# Patient Record
Sex: FEMALE | Race: WHITE | Employment: OTHER | URBAN - METROPOLITAN AREA
[De-identification: names, ages, dates, MRNs, and addresses within clinical notes are randomized per-mention and may not be internally consistent; named-entity substitution may affect disease eponyms.]

---

## 2018-03-20 ENCOUNTER — NURSE ONLY (OUTPATIENT)
Dept: NON INVASIVE DIAGNOSTICS | Age: 68
End: 2018-03-20
Payer: MEDICARE

## 2018-03-20 DIAGNOSIS — I49.5 SSS (SICK SINUS SYNDROME) (HCC): ICD-10-CM

## 2018-03-20 DIAGNOSIS — Z95.0 PACEMAKER: Primary | ICD-10-CM

## 2018-03-20 PROCEDURE — 93296 REM INTERROG EVL PM/IDS: CPT | Performed by: INTERNAL MEDICINE

## 2018-03-20 PROCEDURE — 93294 REM INTERROG EVL PM/LDLS PM: CPT | Performed by: INTERNAL MEDICINE

## 2018-06-13 ENCOUNTER — OFFICE VISIT (OUTPATIENT)
Dept: NON INVASIVE DIAGNOSTICS | Age: 68
End: 2018-06-13
Payer: MEDICARE

## 2018-06-13 VITALS
SYSTOLIC BLOOD PRESSURE: 138 MMHG | HEART RATE: 78 BPM | HEIGHT: 68 IN | RESPIRATION RATE: 14 BRPM | DIASTOLIC BLOOD PRESSURE: 68 MMHG | WEIGHT: 204 LBS | BODY MASS INDEX: 30.92 KG/M2

## 2018-06-13 DIAGNOSIS — E66.9 OBESITY (BMI 30.0-34.9): ICD-10-CM

## 2018-06-13 DIAGNOSIS — I49.5 SSS (SICK SINUS SYNDROME) (HCC): ICD-10-CM

## 2018-06-13 DIAGNOSIS — Z95.0 PACEMAKER: Primary | ICD-10-CM

## 2018-06-13 DIAGNOSIS — I10 HTN (HYPERTENSION), BENIGN: ICD-10-CM

## 2018-06-13 PROCEDURE — 93280 PM DEVICE PROGR EVAL DUAL: CPT | Performed by: INTERNAL MEDICINE

## 2018-06-13 PROCEDURE — 1036F TOBACCO NON-USER: CPT | Performed by: INTERNAL MEDICINE

## 2018-06-13 PROCEDURE — 4040F PNEUMOC VAC/ADMIN/RCVD: CPT | Performed by: INTERNAL MEDICINE

## 2018-06-13 PROCEDURE — 3017F COLORECTAL CA SCREEN DOC REV: CPT | Performed by: INTERNAL MEDICINE

## 2018-06-13 PROCEDURE — 99213 OFFICE O/P EST LOW 20 MIN: CPT | Performed by: INTERNAL MEDICINE

## 2018-06-13 PROCEDURE — G8427 DOCREV CUR MEDS BY ELIG CLIN: HCPCS | Performed by: INTERNAL MEDICINE

## 2018-06-13 PROCEDURE — 1090F PRES/ABSN URINE INCON ASSESS: CPT | Performed by: INTERNAL MEDICINE

## 2018-06-13 PROCEDURE — G8400 PT W/DXA NO RESULTS DOC: HCPCS | Performed by: INTERNAL MEDICINE

## 2018-06-13 PROCEDURE — G8417 CALC BMI ABV UP PARAM F/U: HCPCS | Performed by: INTERNAL MEDICINE

## 2018-06-13 PROCEDURE — 1123F ACP DISCUSS/DSCN MKR DOCD: CPT | Performed by: INTERNAL MEDICINE

## 2018-08-06 ENCOUNTER — APPOINTMENT (OUTPATIENT)
Dept: GENERAL RADIOLOGY | Age: 68
End: 2018-08-06
Payer: MEDICARE

## 2018-08-06 ENCOUNTER — HOSPITAL ENCOUNTER (EMERGENCY)
Age: 68
Discharge: HOME | End: 2018-08-06
Attending: EMERGENCY MEDICINE
Payer: MEDICARE

## 2018-08-06 VITALS
SYSTOLIC BLOOD PRESSURE: 138 MMHG | TEMPERATURE: 98 F | RESPIRATION RATE: 16 BRPM | HEART RATE: 74 BPM | OXYGEN SATURATION: 94 % | HEIGHT: 68 IN | DIASTOLIC BLOOD PRESSURE: 62 MMHG | WEIGHT: 200 LBS | BODY MASS INDEX: 30.31 KG/M2

## 2018-08-06 DIAGNOSIS — S42.225A CLOSED 2-PART NONDISPLACED FRACTURE OF SURGICAL NECK OF LEFT HUMERUS, INITIAL ENCOUNTER: Primary | ICD-10-CM

## 2018-08-06 PROCEDURE — 6370000000 HC RX 637 (ALT 250 FOR IP): Performed by: EMERGENCY MEDICINE

## 2018-08-06 PROCEDURE — 99283 EMERGENCY DEPT VISIT LOW MDM: CPT

## 2018-08-06 PROCEDURE — 73030 X-RAY EXAM OF SHOULDER: CPT

## 2018-08-06 RX ORDER — ACETAMINOPHEN 500 MG
1000 TABLET ORAL ONCE
Status: COMPLETED | OUTPATIENT
Start: 2018-08-06 | End: 2018-08-06

## 2018-08-06 RX ORDER — HYDROCODONE BITARTRATE AND ACETAMINOPHEN 5; 325 MG/1; MG/1
1 TABLET ORAL EVERY 6 HOURS PRN
Qty: 20 TABLET | Refills: 0 | Status: SHIPPED | OUTPATIENT
Start: 2018-08-06 | End: 2018-08-11

## 2018-08-06 RX ORDER — CHOLECALCIFEROL (VITAMIN D3) 125 MCG
500 CAPSULE ORAL DAILY
Status: ON HOLD | COMMUNITY

## 2018-08-06 RX ORDER — FAMOTIDINE 40 MG/1
40 TABLET, FILM COATED ORAL DAILY
COMMUNITY
End: 2019-10-15

## 2018-08-06 RX ORDER — PROMETHAZINE HYDROCHLORIDE 25 MG/1
25 TABLET ORAL EVERY 8 HOURS PRN
Status: ON HOLD | COMMUNITY

## 2018-08-06 RX ADMIN — ACETAMINOPHEN 1000 MG: 500 TABLET ORAL at 19:28

## 2018-08-06 ASSESSMENT — PAIN DESCRIPTION - LOCATION
LOCATION: SHOULDER;ARM
LOCATION: ARM;SHOULDER

## 2018-08-06 ASSESSMENT — PAIN DESCRIPTION - FREQUENCY
FREQUENCY: CONTINUOUS
FREQUENCY: CONTINUOUS

## 2018-08-06 ASSESSMENT — PAIN DESCRIPTION - ORIENTATION
ORIENTATION: LEFT;UPPER
ORIENTATION: LEFT;UPPER

## 2018-08-06 ASSESSMENT — PAIN DESCRIPTION - DESCRIPTORS
DESCRIPTORS: SQUEEZING
DESCRIPTORS: ACHING

## 2018-08-06 ASSESSMENT — PAIN SCALES - GENERAL
PAINLEVEL_OUTOF10: 5
PAINLEVEL_OUTOF10: 7

## 2018-08-06 ASSESSMENT — PAIN DESCRIPTION - PAIN TYPE
TYPE: ACUTE PAIN
TYPE: ACUTE PAIN

## 2018-08-06 NOTE — ED PROVIDER NOTES
HPI:  18,   Time: 6:45 PM         Kenya Gurrola is a 79 y.o. female presenting to the ED for Left shoulder pain, beginning several minutes ago. The complaint has been persistent, moderate in severity, and worsened by movement of  The left arm . Patient states she get up off the couch and she turned became dizzy and fell against the fireplace landing on her left shoulder. She does complain of pain to the proximal left shoulder. She denies any other injuries. She does have a history of a syncope with collapse. She also suffers from hypertension and diabetes type II as well as COPD. She denies any chest pain or shortness of breath. She has no abdominal pain nausea vomiting melena or hematochezia. No other injuries are reported. She has no neurological complaints the left arm. She did not hit her head. She is on no blood thinners other than aspirin. EMS was called patient was put in a sling and swath and brought to [facility] for evaluation. ROS:   Pertinent positives and negatives are stated within HPI, all other systems reviewed and are negative.  --------------------------------------------- PAST HISTORY ---------------------------------------------  Past Medical History:  has a past medical history of COPD (chronic obstructive pulmonary disease) ([facility] [address] [facility] Depression; [facility] Hypertension; Neuropathy; Sinus arrest; SSS [facility]) [facility] and Syncope and collapse. Past Surgical History:  has a past surgical history that includes  section; Hysterectomy; Tubal ligation; and Cardiac pacemaker placement (2016). Social History:  reports that she quit smoking about 12 years ago. She started smoking about 55 years ago. She smoked 1.00 pack per day. She has never used smokeless tobacco. She reports that she does not drink alcohol or use drugs. Family History: family history includes Alzheimer's Disease in her father.      The patients home medications have been reviewed. Allergies: Penicillins and Sulfa antibiotics    -------------------------------------------------- RESULTS -------------------------------------------------  All laboratory and radiology results have been personally reviewed by myself   LABS:  No results found for this visit on 08/06/18. RADIOLOGY:  Interpreted by Radiologist.  XR SHOULDER LEFT (MIN 2 VIEWS)   Final Result   Angulated fracture of the left humerus. ALERT:  THIS IS AN ABNORMAL REPORT             ------------------------- NURSING NOTES AND VITALS REVIEWED ---------------------------   The nursing notes within the ED encounter and vital signs as below have been reviewed. /62   Pulse 74   Temp 98 °F (36.7 °C) (Oral)   Resp 16   Ht 5' 8\" (1.727 m)   Wt 200 lb (90.7 kg)   SpO2 94%   BMI 30.41 kg/m²   Oxygen Saturation Interpretation: Normal      ---------------------------------------------------PHYSICAL EXAM--------------------------------------      Constitutional/General: Alert and oriented x3, well appearing, non toxic in NAD  Head: NC/AT  Eyes: PERRL, EOMI  Mouth: Oropharynx clear, handling secretions, no trismus  Neck: Supple, full ROM, no meningeal signs  Pulmonary: Lungs clear to auscultation bilaterally, no wheezes, rales, or rhonchi. Not in respiratory distress  Cardiovascular:  Regular rate and rhythm, no murmurs, gallops, or rubs. 2+ distal pulses  Abdomen: Soft, non tender, non distended,   Extremities: Moves all extremities x 3, patient's unable to move the left shoulder secondary to pain. . Warm and well perfused  Skin: warm and dry without rash  Neurologic: GCS 15, no focal deficits. Patient has good motor strength in the left hand.   Psych: Normal Affect      ------------------------------ ED COURSE/MEDICAL DECISION MAKING----------------------  Medications   acetaminophen (TYLENOL) tablet 1,000 mg (1,000 mg Oral Given 8/6/18 1928)         Medical Decision Making:    Tylenol for pain xray of the left

## 2018-08-07 ENCOUNTER — APPOINTMENT (OUTPATIENT)
Dept: CT IMAGING | Age: 68
End: 2018-08-07
Payer: MEDICARE

## 2018-08-07 ENCOUNTER — APPOINTMENT (OUTPATIENT)
Dept: GENERAL RADIOLOGY | Age: 68
End: 2018-08-07
Payer: MEDICARE

## 2018-08-07 ENCOUNTER — HOSPITAL ENCOUNTER (OUTPATIENT)
Age: 68
Setting detail: OBSERVATION
Discharge: HOME | End: 2018-08-08
Attending: EMERGENCY MEDICINE | Admitting: INTERNAL MEDICINE
Payer: MEDICARE

## 2018-08-07 DIAGNOSIS — I95.1 ORTHOSTATIC HYPOTENSION: ICD-10-CM

## 2018-08-07 DIAGNOSIS — R55 NEAR SYNCOPE: Primary | ICD-10-CM

## 2018-08-07 DIAGNOSIS — S42.212D CLOSED DISPLACED FRACTURE OF SURGICAL NECK OF LEFT HUMERUS WITH ROUTINE HEALING, UNSPECIFIED FRACTURE MORPHOLOGY, SUBSEQUENT ENCOUNTER: ICD-10-CM

## 2018-08-07 LAB
ANION GAP SERPL CALCULATED.3IONS-SCNC: 11 MMOL/L (ref 7–16)
BACTERIA: ABNORMAL /HPF
BASOPHILS ABSOLUTE: 0.02 E9/L (ref 0–0.2)
BASOPHILS RELATIVE PERCENT: 0.2 % (ref 0–2)
BILIRUBIN URINE: NEGATIVE
BLOOD, URINE: NEGATIVE
BUN BLDV-MCNC: 16 MG/DL (ref 8–23)
CALCIUM SERPL-MCNC: 9.5 MG/DL (ref 8.6–10.2)
CHLORIDE BLD-SCNC: 96 MMOL/L (ref 98–107)
CLARITY: CLEAR
CO2: 29 MMOL/L (ref 22–29)
COLOR: YELLOW
CREAT SERPL-MCNC: 0.8 MG/DL (ref 0.5–1)
EOSINOPHILS ABSOLUTE: 0.3 E9/L (ref 0.05–0.5)
EOSINOPHILS RELATIVE PERCENT: 3 % (ref 0–6)
EPITHELIAL CELLS, UA: ABNORMAL /HPF
GFR AFRICAN AMERICAN: >60
GFR NON-AFRICAN AMERICAN: >60 ML/MIN/1.73
GLUCOSE BLD-MCNC: 158 MG/DL (ref 74–109)
GLUCOSE URINE: NEGATIVE MG/DL
HBA1C MFR BLD: 5.9 % (ref 4–5.6)
HCT VFR BLD CALC: 36.2 % (ref 34–48)
HEMOGLOBIN: 12 G/DL (ref 11.5–15.5)
IMMATURE GRANULOCYTES #: 0.07 E9/L
IMMATURE GRANULOCYTES %: 0.7 % (ref 0–5)
KETONES, URINE: NEGATIVE MG/DL
LEUKOCYTE ESTERASE, URINE: ABNORMAL
LYMPHOCYTES ABSOLUTE: 2.62 E9/L (ref 1.5–4)
LYMPHOCYTES RELATIVE PERCENT: 26.1 % (ref 20–42)
MCH RBC QN AUTO: 29.3 PG (ref 26–35)
MCHC RBC AUTO-ENTMCNC: 33.1 % (ref 32–34.5)
MCV RBC AUTO: 88.5 FL (ref 80–99.9)
METER GLUCOSE: 121 MG/DL (ref 70–110)
METER GLUCOSE: 133 MG/DL (ref 70–110)
METER GLUCOSE: 199 MG/DL (ref 70–110)
METER GLUCOSE: 201 MG/DL (ref 70–110)
MONOCYTES ABSOLUTE: 0.66 E9/L (ref 0.1–0.95)
MONOCYTES RELATIVE PERCENT: 6.6 % (ref 2–12)
NEUTROPHILS ABSOLUTE: 6.35 E9/L (ref 1.8–7.3)
NEUTROPHILS RELATIVE PERCENT: 63.4 % (ref 43–80)
NITRITE, URINE: NEGATIVE
PDW BLD-RTO: 12 FL (ref 11.5–15)
PH UA: 7 (ref 5–9)
PLATELET # BLD: 257 E9/L (ref 130–450)
PMV BLD AUTO: 9.1 FL (ref 7–12)
POTASSIUM SERPL-SCNC: 3.8 MMOL/L (ref 3.5–5)
PROTEIN UA: NEGATIVE MG/DL
RBC # BLD: 4.09 E12/L (ref 3.5–5.5)
RBC UA: ABNORMAL /HPF (ref 0–2)
SODIUM BLD-SCNC: 136 MMOL/L (ref 132–146)
SPECIFIC GRAVITY UA: 1.01 (ref 1–1.03)
TROPONIN: <0.01 NG/ML (ref 0–0.03)
UROBILINOGEN, URINE: 0.2 E.U./DL
WBC # BLD: 10 E9/L (ref 4.5–11.5)
WBC UA: ABNORMAL /HPF (ref 0–5)

## 2018-08-07 PROCEDURE — 96372 THER/PROPH/DIAG INJ SC/IM: CPT

## 2018-08-07 PROCEDURE — 85025 COMPLETE CBC W/AUTO DIFF WBC: CPT

## 2018-08-07 PROCEDURE — 2580000003 HC RX 258: Performed by: INTERNAL MEDICINE

## 2018-08-07 PROCEDURE — G0378 HOSPITAL OBSERVATION PER HR: HCPCS

## 2018-08-07 PROCEDURE — 2700000000 HC OXYGEN THERAPY PER DAY

## 2018-08-07 PROCEDURE — 82962 GLUCOSE BLOOD TEST: CPT

## 2018-08-07 PROCEDURE — 6360000002 HC RX W HCPCS: Performed by: INTERNAL MEDICINE

## 2018-08-07 PROCEDURE — 6370000000 HC RX 637 (ALT 250 FOR IP): Performed by: INTERNAL MEDICINE

## 2018-08-07 PROCEDURE — 99285 EMERGENCY DEPT VISIT HI MDM: CPT

## 2018-08-07 PROCEDURE — 96374 THER/PROPH/DIAG INJ IV PUSH: CPT

## 2018-08-07 PROCEDURE — 94664 DEMO&/EVAL PT USE INHALER: CPT

## 2018-08-07 PROCEDURE — G8982 BODY POS GOAL STATUS: HCPCS

## 2018-08-07 PROCEDURE — 71045 X-RAY EXAM CHEST 1 VIEW: CPT

## 2018-08-07 PROCEDURE — 94640 AIRWAY INHALATION TREATMENT: CPT

## 2018-08-07 PROCEDURE — 99222 1ST HOSP IP/OBS MODERATE 55: CPT | Performed by: INTERNAL MEDICINE

## 2018-08-07 PROCEDURE — 83036 HEMOGLOBIN GLYCOSYLATED A1C: CPT

## 2018-08-07 PROCEDURE — 97165 OT EVAL LOW COMPLEX 30 MIN: CPT

## 2018-08-07 PROCEDURE — 94761 N-INVAS EAR/PLS OXIMETRY MLT: CPT

## 2018-08-07 PROCEDURE — 93005 ELECTROCARDIOGRAM TRACING: CPT | Performed by: EMERGENCY MEDICINE

## 2018-08-07 PROCEDURE — 96361 HYDRATE IV INFUSION ADD-ON: CPT

## 2018-08-07 PROCEDURE — 70450 CT HEAD/BRAIN W/O DYE: CPT

## 2018-08-07 PROCEDURE — G8988 SELF CARE GOAL STATUS: HCPCS

## 2018-08-07 PROCEDURE — 81001 URINALYSIS AUTO W/SCOPE: CPT

## 2018-08-07 PROCEDURE — G8987 SELF CARE CURRENT STATUS: HCPCS

## 2018-08-07 PROCEDURE — G8981 BODY POS CURRENT STATUS: HCPCS

## 2018-08-07 PROCEDURE — 36415 COLL VENOUS BLD VENIPUNCTURE: CPT

## 2018-08-07 PROCEDURE — 2580000003 HC RX 258: Performed by: EMERGENCY MEDICINE

## 2018-08-07 PROCEDURE — 84484 ASSAY OF TROPONIN QUANT: CPT

## 2018-08-07 PROCEDURE — 97161 PT EVAL LOW COMPLEX 20 MIN: CPT

## 2018-08-07 PROCEDURE — 80048 BASIC METABOLIC PNL TOTAL CA: CPT

## 2018-08-07 PROCEDURE — 6370000000 HC RX 637 (ALT 250 FOR IP): Performed by: EMERGENCY MEDICINE

## 2018-08-07 RX ORDER — GLIMEPIRIDE 2 MG/1
2 TABLET ORAL
Status: DISCONTINUED | OUTPATIENT
Start: 2018-08-08 | End: 2018-08-08 | Stop reason: HOSPADM

## 2018-08-07 RX ORDER — PHENELZINE SULFATE 15 MG/1
15 TABLET ORAL 3 TIMES DAILY
Status: DISCONTINUED | OUTPATIENT
Start: 2018-08-07 | End: 2018-08-08 | Stop reason: HOSPADM

## 2018-08-07 RX ORDER — ACETAMINOPHEN 325 MG/1
650 TABLET ORAL EVERY 4 HOURS PRN
Status: DISCONTINUED | OUTPATIENT
Start: 2018-08-07 | End: 2018-08-08 | Stop reason: HOSPADM

## 2018-08-07 RX ORDER — PHENELZINE SULFATE 15 MG/1
15 TABLET ORAL 4 TIMES DAILY
Status: DISCONTINUED | OUTPATIENT
Start: 2018-08-07 | End: 2018-08-07

## 2018-08-07 RX ORDER — DEXTROSE MONOHYDRATE 50 MG/ML
100 INJECTION, SOLUTION INTRAVENOUS PRN
Status: DISCONTINUED | OUTPATIENT
Start: 2018-08-07 | End: 2018-08-08 | Stop reason: HOSPADM

## 2018-08-07 RX ORDER — GLIMEPIRIDE 2 MG/1
2 TABLET ORAL 2 TIMES DAILY WITH MEALS
Status: DISCONTINUED | OUTPATIENT
Start: 2018-08-07 | End: 2018-08-07

## 2018-08-07 RX ORDER — SODIUM CHLORIDE 0.9 % (FLUSH) 0.9 %
10 SYRINGE (ML) INJECTION PRN
Status: DISCONTINUED | OUTPATIENT
Start: 2018-08-07 | End: 2018-08-08 | Stop reason: HOSPADM

## 2018-08-07 RX ORDER — HYDROCODONE BITARTRATE AND ACETAMINOPHEN 5; 325 MG/1; MG/1
1 TABLET ORAL EVERY 6 HOURS PRN
Status: DISCONTINUED | OUTPATIENT
Start: 2018-08-07 | End: 2018-08-08 | Stop reason: HOSPADM

## 2018-08-07 RX ORDER — 0.9 % SODIUM CHLORIDE 0.9 %
1000 INTRAVENOUS SOLUTION INTRAVENOUS ONCE
Status: COMPLETED | OUTPATIENT
Start: 2018-08-07 | End: 2018-08-07

## 2018-08-07 RX ORDER — ONDANSETRON 2 MG/ML
4 INJECTION INTRAMUSCULAR; INTRAVENOUS EVERY 6 HOURS PRN
Status: DISCONTINUED | OUTPATIENT
Start: 2018-08-07 | End: 2018-08-08 | Stop reason: HOSPADM

## 2018-08-07 RX ORDER — ASPIRIN 81 MG/1
324 TABLET, CHEWABLE ORAL ONCE
Status: COMPLETED | OUTPATIENT
Start: 2018-08-07 | End: 2018-08-07

## 2018-08-07 RX ORDER — DEXTROSE MONOHYDRATE 25 G/50ML
12.5 INJECTION, SOLUTION INTRAVENOUS PRN
Status: DISCONTINUED | OUTPATIENT
Start: 2018-08-07 | End: 2018-08-08 | Stop reason: HOSPADM

## 2018-08-07 RX ORDER — LISINOPRIL 10 MG/1
10 TABLET ORAL DAILY
Status: DISCONTINUED | OUTPATIENT
Start: 2018-08-07 | End: 2018-08-08 | Stop reason: HOSPADM

## 2018-08-07 RX ORDER — LANOLIN ALCOHOL/MO/W.PET/CERES
500 CREAM (GRAM) TOPICAL DAILY
Status: DISCONTINUED | OUTPATIENT
Start: 2018-08-07 | End: 2018-08-08 | Stop reason: HOSPADM

## 2018-08-07 RX ORDER — FAMOTIDINE 20 MG/1
40 TABLET, FILM COATED ORAL DAILY
Status: DISCONTINUED | OUTPATIENT
Start: 2018-08-07 | End: 2018-08-08 | Stop reason: HOSPADM

## 2018-08-07 RX ORDER — ASPIRIN 325 MG
325 TABLET ORAL NIGHTLY
Status: DISCONTINUED | OUTPATIENT
Start: 2018-08-07 | End: 2018-08-08 | Stop reason: HOSPADM

## 2018-08-07 RX ORDER — SODIUM CHLORIDE 9 MG/ML
INJECTION, SOLUTION INTRAVENOUS CONTINUOUS
Status: DISCONTINUED | OUTPATIENT
Start: 2018-08-07 | End: 2018-08-08 | Stop reason: HOSPADM

## 2018-08-07 RX ORDER — GABAPENTIN 300 MG/1
300 CAPSULE ORAL NIGHTLY
Status: DISCONTINUED | OUTPATIENT
Start: 2018-08-07 | End: 2018-08-08 | Stop reason: HOSPADM

## 2018-08-07 RX ORDER — LORAZEPAM 1 MG/1
1 TABLET ORAL EVERY 6 HOURS PRN
Status: DISCONTINUED | OUTPATIENT
Start: 2018-08-07 | End: 2018-08-08 | Stop reason: HOSPADM

## 2018-08-07 RX ORDER — NICOTINE POLACRILEX 4 MG
15 LOZENGE BUCCAL PRN
Status: DISCONTINUED | OUTPATIENT
Start: 2018-08-07 | End: 2018-08-08 | Stop reason: HOSPADM

## 2018-08-07 RX ORDER — SODIUM CHLORIDE 0.9 % (FLUSH) 0.9 %
10 SYRINGE (ML) INJECTION EVERY 12 HOURS SCHEDULED
Status: DISCONTINUED | OUTPATIENT
Start: 2018-08-07 | End: 2018-08-08 | Stop reason: HOSPADM

## 2018-08-07 RX ORDER — ARIPIPRAZOLE 10 MG/1
10 TABLET ORAL NIGHTLY
Status: DISCONTINUED | OUTPATIENT
Start: 2018-08-07 | End: 2018-08-08 | Stop reason: HOSPADM

## 2018-08-07 RX ORDER — FLUTICASONE FUROATE AND VILANTEROL 100; 25 UG/1; UG/1
1 POWDER RESPIRATORY (INHALATION) DAILY
Status: DISCONTINUED | OUTPATIENT
Start: 2018-08-07 | End: 2018-08-07 | Stop reason: ALTCHOICE

## 2018-08-07 RX ORDER — HYDROCODONE BITARTRATE AND ACETAMINOPHEN 5; 325 MG/1; MG/1
1 TABLET ORAL ONCE
Status: COMPLETED | OUTPATIENT
Start: 2018-08-07 | End: 2018-08-07

## 2018-08-07 RX ORDER — RISPERIDONE 1 MG/1
1 TABLET, FILM COATED ORAL DAILY
Status: DISCONTINUED | OUTPATIENT
Start: 2018-08-07 | End: 2018-08-08 | Stop reason: HOSPADM

## 2018-08-07 RX ORDER — PROMETHAZINE HYDROCHLORIDE 25 MG/1
25 TABLET ORAL 3 TIMES DAILY
Status: DISCONTINUED | OUTPATIENT
Start: 2018-08-07 | End: 2018-08-07

## 2018-08-07 RX ADMIN — MOMETASONE FUROATE AND FORMOTEROL FUMARATE DIHYDRATE 2 PUFF: 200; 5 AEROSOL RESPIRATORY (INHALATION) at 08:58

## 2018-08-07 RX ADMIN — ONDANSETRON HYDROCHLORIDE 4 MG: 2 INJECTION, SOLUTION INTRAMUSCULAR; INTRAVENOUS at 20:54

## 2018-08-07 RX ADMIN — RISPERIDONE 1 MG: 1 TABLET ORAL at 09:03

## 2018-08-07 RX ADMIN — SODIUM CHLORIDE 1000 ML: 9 INJECTION, SOLUTION INTRAVENOUS at 03:54

## 2018-08-07 RX ADMIN — Medication 10 ML: at 20:55

## 2018-08-07 RX ADMIN — GLIMEPIRIDE 2 MG: 2 TABLET ORAL at 09:03

## 2018-08-07 RX ADMIN — INSULIN LISPRO 2 UNITS: 100 INJECTION, SOLUTION INTRAVENOUS; SUBCUTANEOUS at 09:02

## 2018-08-07 RX ADMIN — CYANOCOBALAMIN TAB 1000 MCG 500 MCG: 1000 TAB at 09:03

## 2018-08-07 RX ADMIN — MOMETASONE FUROATE AND FORMOTEROL FUMARATE DIHYDRATE 2 PUFF: 200; 5 AEROSOL RESPIRATORY (INHALATION) at 19:34

## 2018-08-07 RX ADMIN — LINAGLIPTIN 5 MG: 5 TABLET, FILM COATED ORAL at 09:03

## 2018-08-07 RX ADMIN — INSULIN LISPRO 1 UNITS: 100 INJECTION, SOLUTION INTRAVENOUS; SUBCUTANEOUS at 13:21

## 2018-08-07 RX ADMIN — SODIUM CHLORIDE: 9 INJECTION, SOLUTION INTRAVENOUS at 21:00

## 2018-08-07 RX ADMIN — ENOXAPARIN SODIUM 40 MG: 40 INJECTION, SOLUTION INTRAVENOUS; SUBCUTANEOUS at 09:03

## 2018-08-07 RX ADMIN — LISINOPRIL 10 MG: 10 TABLET ORAL at 09:03

## 2018-08-07 RX ADMIN — HYDROCODONE BITARTRATE AND ACETAMINOPHEN 1 TABLET: 5; 325 TABLET ORAL at 17:12

## 2018-08-07 RX ADMIN — HYDROCODONE BITARTRATE AND ACETAMINOPHEN 1 TABLET: 5; 325 TABLET ORAL at 07:15

## 2018-08-07 RX ADMIN — HYDROCODONE BITARTRATE AND ACETAMINOPHEN 1 TABLET: 5; 325 TABLET ORAL at 13:17

## 2018-08-07 RX ADMIN — SODIUM CHLORIDE: 9 INJECTION, SOLUTION INTRAVENOUS at 07:15

## 2018-08-07 RX ADMIN — ASPIRIN 81 MG 324 MG: 81 TABLET ORAL at 05:09

## 2018-08-07 RX ADMIN — FAMOTIDINE 40 MG: 20 TABLET, FILM COATED ORAL at 09:03

## 2018-08-07 RX ADMIN — GABAPENTIN 300 MG: 300 CAPSULE ORAL at 20:56

## 2018-08-07 RX ADMIN — HYDROCODONE BITARTRATE AND ACETAMINOPHEN 1 TABLET: 5; 325 TABLET ORAL at 20:55

## 2018-08-07 ASSESSMENT — PAIN DESCRIPTION - PAIN TYPE
TYPE: ACUTE PAIN

## 2018-08-07 ASSESSMENT — PAIN DESCRIPTION - ORIENTATION
ORIENTATION: LEFT

## 2018-08-07 ASSESSMENT — PAIN SCALES - GENERAL
PAINLEVEL_OUTOF10: 0
PAINLEVEL_OUTOF10: 7
PAINLEVEL_OUTOF10: 0
PAINLEVEL_OUTOF10: 8
PAINLEVEL_OUTOF10: 9
PAINLEVEL_OUTOF10: 0
PAINLEVEL_OUTOF10: 0
PAINLEVEL_OUTOF10: 7
PAINLEVEL_OUTOF10: 7
PAINLEVEL_OUTOF10: 8

## 2018-08-07 ASSESSMENT — PAIN DESCRIPTION - LOCATION
LOCATION: ARM
LOCATION: SHOULDER
LOCATION: ARM
LOCATION: SHOULDER
LOCATION: SHOULDER

## 2018-08-07 ASSESSMENT — PAIN DESCRIPTION - DESCRIPTORS
DESCRIPTORS: SORE;DISCOMFORT
DESCRIPTORS: ACHING;DISCOMFORT;SORE
DESCRIPTORS: ACHING
DESCRIPTORS: ACHING;DISCOMFORT;SORE

## 2018-08-07 ASSESSMENT — ENCOUNTER SYMPTOMS
COLOR CHANGE: 0
BLOOD IN STOOL: 0
NAUSEA: 1
ABDOMINAL PAIN: 0
SORE THROAT: 0
COUGH: 0
CONSTIPATION: 0
RHINORRHEA: 0
VOMITING: 0
DIARRHEA: 0
SHORTNESS OF BREATH: 1
BACK PAIN: 0

## 2018-08-07 NOTE — PROGRESS NOTES
P Quality Flow/Interdisciplinary Rounds Progress Note        Quality Flow Rounds held on August 7, 2018    Disciplines Attending:  Bedside Nurse, ,  and Nursing Unit Leadership    Kavon Jim was admitted on 8/7/2018  3:13 AM    Anticipated Discharge Date:  Expected Discharge Date: 08/09/18    Disposition:    Immanuel Score:  Immanuel Scale Score: 20    Readmission Risk              Risk of Unplanned Readmission:        15             Discussed patient goal for the day, patient clinical progression, and barriers to discharge. The following Goal(s) of the Day/Commitment(s) have been identified:  Check cardio plan and PT/OT recommendations.       Tonny Castillo  August 7, 2018

## 2018-08-07 NOTE — ED PROVIDER NOTES
Patient is a 66-year-old female presenting to the emergency department with dizziness and near syncope. She was seen here in the emergency department about 10 hours ago after she got up off of her couch, became lightheaded, and fell, fracturing her left proximal humerus. She did not have any other labs or imaging done at that time. She said that she went home and about 30 minutes prior to arrival, got up from bed and walked to the bathroom. As she was sitting on the toilet, she became nauseous and had a cold sweat. She mentioned that she has substernal sharp chest pain at that time as well. She got up from the toilet and became a little more dizzy, lightheaded, and had an unsteady gait. She was holding onto the wall and said that she never fell. She said that she felt a little short of breath and mentions that she has COPD. She got back to her bed and still felt nauseous and short of breath, so she decided to call for an ambulance. She has history of sick sinus syndrome and had a pacemaker placed about 2 years ago. She is not on any anticoagulation. She mentions that it is only her and her cat that live at home. The history is provided by the patient. Review of Systems   Constitutional: Positive for diaphoresis. Negative for chills and fever. HENT: Negative for congestion, rhinorrhea and sore throat. Respiratory: Positive for shortness of breath. Negative for cough. Cardiovascular: Positive for chest pain. Negative for palpitations. Gastrointestinal: Positive for nausea. Negative for abdominal pain, blood in stool, constipation, diarrhea and vomiting. Genitourinary: Negative for difficulty urinating, dysuria and hematuria. Musculoskeletal: Negative for back pain and neck pain. Skin: Negative for color change, rash and wound. Neurological: Positive for dizziness and light-headedness. Negative for syncope, weakness and headaches. Psychiatric/Behavioral: Negative for confusion. been electronically signed by Tripp Luu MD.      XR CHEST PORTABLE    (Results Pending)       EKG: This EKG is signed and interpreted by ED Physician. Time:  0327   Rate: 72  Rhythm: Sinus. Interpretation: Normal axis; no ST or T-wave changes concerning for ischemia. Comparison: stable as compared to patient's most recent EKG, 9/13/16.    ---------------------------- NURSING NOTES AND VITALS REVIEWED -------------------------   The nursing notes within the ED encounter and vital signs as below have been reviewed. BP (!) 155/61   Pulse 76   Temp 98 °F (36.7 °C) (Oral)   Resp 16   Ht 5' 8\" (1.727 m)   Wt 200 lb (90.7 kg)   SpO2 96%   BMI 30.41 kg/m²   Oxygen Saturation Interpretation: Normal      ------------------------------------------PROGRESS NOTES -------------------------------------------    ED COURSE MEDICATIONS:                Medications   0.9 % sodium chloride bolus (0 mLs Intravenous Stopped 8/7/18 7559)   aspirin chewable tablet 324 mg (324 mg Oral Given 8/7/18 3228)         CONSULTATIONS:            0530 - discussed patient with Dr. Maykel Stoner. He agreed to accept admission. Lan Freedman COUNSELING:   I have spoken with the patient and discussed todays results, in addition to providing specific details for the plan of care and counseling regarding the diagnosis and prognosis.     --------------------------------------- IMPRESSION & DISPOSITION --------------------------------     IMPRESSION(s):  1. Near syncope    2. Orthostatic hypotension    3. Closed displaced fracture of surgical neck of left humerus with routine healing, unspecified fracture morphology, subsequent encounter        This patient's ED course included: a personal history and physicial examination, re-evaluation prior to disposition, cardiac monitoring and continuous pulse oximetry    This patient has remained hemodynamically stable during their ED course. DISPOSITION:  Disposition: Admit to telemetry.   Patient condition is stable. END OF PROVIDER NOTE.            Darlin Saez DO  Resident  08/07/18 5177

## 2018-08-07 NOTE — ED NOTES
Patient fitting with a sling left arm. Instructed patient on how to remove sling for bathing and dressing. Friend at bedside and also shown how to remove and apply sling.       Joel Paulson RN  08/06/18 2011

## 2018-08-07 NOTE — CONSULTS
risperiDONE (RISPERDAL) 1 MG tablet Take 1 mg by mouth daily  11/19/16  Yes Historical Provider, MD   Polyethylene Glycol 3350 (MIRALAX PO) Take by mouth as needed    Yes Historical Provider, MD   gabapentin (NEURONTIN) 300 MG capsule Take 300 mg by mouth nightly    Yes Historical Provider, MD   glimepiride (AMARYL) 1 MG tablet Take 2 mg by mouth 2 times daily    Yes Historical Provider, MD   benazepril (LOTENSIN) 10 MG tablet Take 20 mg by mouth nightly    Yes Historical Provider, MD   fluticasone-vilanterol (BREO ELLIPTA) 100-25 MCG/INH AEPB Inhale into the lungs   Yes Historical Provider, MD   phenelzine (NARDIL) 15 MG tablet Take 15 mg by mouth 4 times daily   Yes Historical Provider, MD   LORazepam (ATIVAN) 1 MG tablet Take 1 mg by mouth every 6 hours as needed for Anxiety (takes up to 5 tablets daily). Yes Historical Provider, MD   ARIPiprazole (ABILIFY) 10 MG tablet Take 10 mg by mouth nightly    Yes Historical Provider, MD   aspirin 325 MG tablet Take 325 mg by mouth nightly    Yes Historical Provider, MD   Cholecalciferol (VITAMIN D-3 PO) Take 2,000 Units by mouth nightly    Yes Historical Provider, MD   BIOTIN PO Take 1,000 mcg by mouth nightly    Yes Historical Provider, MD   Melatonin 10 MG TABS Take by mouth nightly    Yes Historical Provider, MD   Levalbuterol Tartrate ([place] HFA IN) Inhale  into the lungs.    Yes Historical Provider, MD       Current Medications:    Current Facility-Administered Medications: sodium chloride flush 0.9 % injection 10 mL, 10 mL, Intravenous, 2 times per day  sodium chloride flush 0.9 % injection 10 mL, 10 mL, Intravenous, PRN  acetaminophen (TYLENOL) tablet 650 mg, 650 mg, Oral, Q4H PRN  enoxaparin (LOVENOX) injection 40 mg, 40 mg, Subcutaneous, Daily  LORazepam (ATIVAN) tablet 1 mg, 1 mg, Oral, Q6H PRN  ARIPiprazole (ABILIFY) tablet 10 mg, 10 mg, Oral, Nightly  aspirin tablet 325 mg, 325 mg, Oral, Nightly  gabapentin (NEURONTIN) capsule 300 mg, 300 mg, Oral,

## 2018-08-07 NOTE — PROGRESS NOTES
Cardiology consult sent via [facility] to Dr. Aristides Mayorga. Dr. Alfredo Sykes on call. Awaiting call back/ new orders.

## 2018-08-07 NOTE — ED NOTES
Patient complaining of a mild headache, hasnt eaten since 12 noon she states. Patient states she just feels tension in neck with injury. Neuro status unchanged. Patient did not strike head with fall.       Kae Gonzalez RN  08/06/18 2030

## 2018-08-07 NOTE — H&P
History and Physical      CHIEF COMPLAINT:  Dizziness      HISTORY OF PRESENT ILLNESS:      The patient is a 79 y.o. female patient of Dr Timo Landin who presents with near syncope. She states recently she has been experiencing intermittent dizziness. Two days ago she stood up from her couch and  became extremely dizzy and fell; she came to the Ed and was found to have a fractured left humerus. She was placed in a sling and discharged home. She continued to experience severe dizziness with standing from a sitting position and while sitting on the toilet she experienced nausea and lightheadedness and broke out in a cold sweat; she returned to the ED. She does have a pacemaker which has been in place for 2 years; she takes multiple medications for depression and schizophrenia. She denies any recent medication changes; she has had a greater than 20 pound weight loss recently which was intentional; she did develop chest pain after the fall which is reproducible, located in the mid chest radiating into her back; she was found to be orthostatic in ED and was given IV fluids; her orthostatics improved with hydration but she continued to complain of dizziness and lightheadedness; she was admitted to observation. She feels much better today.     Past Medical History:    Past Medical History:   Diagnosis Date    [facility] (chronic obstructive [facility] disease) ([facility]     Depression     Diabetes ([facility]     Hypertension     Neuropathy     Sinus arrest     [facility]SS [facility]sick [facility] syndrome) ([facility])     Syncope and collapse        Past Surgical History:    Past Surgical History:   Procedure Laterality Date    CARDIAC PACEMAKER PLACEMENT  2016     SECTION      x3    HYSTERECTOMY      TUBAL LIGATION         Medications Prior to Admission:    Prescriptions Prior to Admission: vitamin B-12 (CYANOCOBALAMIN) 500 MCG tablet, Take 500 mcg by mouth daily  promethazine (PHENERGAN) 25 MG tablet, Take 25 mg by mouth 3 times for chest pain, negative for exertional chest pressure/discomfort  Gastrointestinal: negative for abdominal pain and vomiting  Genitourinary:negative for dysuria and frequency  Integument/breast: negative for pruritus and rash  Hematologic/lymphatic: negative for bleeding and easy bruising  Musculoskeletal:negative for arthralgias and back pain  Neurological: positive for dizziness, negative for vertigo  Behavioral/Psych: negative for excessive alcohol consumption and illegal drug usage  Endocrine: negative for temperature intolerance  Allergic/Immunologic: negative for anaphylaxis and angioedema    PHYSICAL EXAM:    Vitals:  BP (!) 175/75   Pulse 75   Temp 98 °F (36.7 °C) (Oral)   Resp 18   Ht 5' 8\" (1.727 m)   Wt 212 lb (96.2 kg)   SpO2 90%   BMI 32.23 kg/m²     General appearance: alert, appears stated age and cooperative  Head: Normocephalic, without obvious abnormality, atraumatic  Eyes: conjunctivae/corneas clear. PERRL, EOM's intact. Fundi benign. Ears: normal TM's and external ear canals both ears  Nose: Nares normal. Septum midline. Mucosa normal. No drainage or sinus tenderness.   Throat: lips, mucosa, and tongue normal; teeth and gums normal  Neck: no adenopathy, no carotid bruit, no JVD, supple, symmetrical, trachea midline and thyroid not enlarged, symmetric, no tenderness/mass/nodules  Lungs: clear to auscultation bilaterally  Heart: regular rate and rhythm, S1, S2 normal, no murmur, click, rub or gallop  Abdomen: soft, non-tender; bowel sounds normal; no masses,  no organomegaly  Extremities: no ulcers, gangrene or trophic changes and left arm immobilized in sling; no peripheral edema  Pulses: 2+ and symmetric  Skin: Skin color, texture, turgor normal. No rashes or lesions  Neurologic: Grossly normal    Results      Component Value Units   Hemoglobin A1C [[id number]] (Abnormal) Collected: 08/07/18 0345   Updated: 08/07/18 1055     Hemoglobin A1C 5.9 (H) %   POCT Glucose [[id number]] (Abnormal) pg    MCHC 33.1 %    RDW 12.0 fL    Platelets 839 R5/T    MPV 9.1 fL    Neutrophils % 63.4 %    Immature Granulocytes % 0.7 %    Lymphocytes % 26.1 %    Monocytes % 6.6 %    Eosinophils % 3.0 %    Basophils % 0.2 %    Neutrophils # 6.35 E9/L    Immature Granulocytes # 0.07 E9/L    Lymphocytes # 2.62 E9/L    Monocytes # 0.66 E9/L    Eosinophils # 0.30 E9/L    Basophils # 0.02 E9/L           Problem list:    Patient Active Problem List   Diagnosis    HTN (hypertension), benign    Syncope and collapse    Diabetes mellitus type 2, controlled (HCC)    Vertebral compression fracture    SSS (sick sinus syndrome) (HCC)    [facility]    Depression    OA (osteoarthritis)    Obesity (BMI 30.0-34. 9)    Pacemaker    Dyspnea    Chest pain of uncertain etiology    Near syncope         ASSESSMENT:      1. Orthostatic hypotension, likely mild intravascular dehydration, need to consider adverse medication reaction related to polypharmacy    2. History of sick sinus syndrome status post permanent pacemaker    3. Structural chest pain    4. Diabetes mellitus type II, controlled    5. COPD    6. Essential hypertension    PLAN:     1. Admit for IV hydration and cardiac consultation    2. Recheck orthostatics    3. PT/OT evaluation    4. Adjust medical regimen to attempt to reduce risk of orthostasis    5.  Anticipate home with home care on discharge when/if okay with cardiology    Iris Cool D.O., [facility]  11:39 AM  8/7/2018

## 2018-08-07 NOTE — PROGRESS NOTES
Occupational Therapy  OCCUPATIONAL THERAPY INITIAL EVALUATION      Date:2018  Patient Name: Yuki Kaba  MRN: [id number]  : 1950  Room: [address] [place] [address]A     Evaluating OT: Jacobo Winters OTR/L       Recommended Adaptive Equipment: TBA     AM-PAC Daily Activity Raw Score: 17/24  G Code:  CJ    Diagnosis: Near syncope. Recent L proximal humeral fx - fall at home. Sent t home from ER - to follow up with orthopedic     Past Medical History: COPD, neuropathy, pacemaker  Precautions: Falls, L UE sling      Home Living/PLOF:   Patient lives alone, 1 story , 1 small step to enter. Home O2 as needed. Tub/shower unit   PTA: Independent, no device. Ex  and neighbors able to assist as needed     Pain Level: pt c/o no pain  this session     Hearing: wfls  Vision: wfls      Cognition: oriented x 3    UE Assessment:  Hand Dominance: Right [x]  Left []    R UE AROM and strength WFLs,  L hand AROM WFLS, sling L UE - patient to follow up with ortho       Functional Assessment:   Initial Eval Status 18  Comments   Feeding  Set-up     Grooming  Set-up     Upper Body Dressing Mod A     Lower Body Dressing Mod A  Able to cross leg to don/doff R sock- seated in chair    Bathing     Toileting      Bed Mobility  Sitting in chair upon arrival     Functional Transfers Supervision   Sit-stand from chair      Functional Mobility SBA  , no device  Ambulating in room       Sit balance: Independent   Stand balance: supervision   Endurance/Activity tolerance: Tolerant of func eval   Sensation: WFLS                                Comments: Upon arrival pt sitting in chair . At end of session pt returned to chair  with all devices within reach, all lines and tubes intact. Call light within reach.   Bed/chair alarm ON       Assessment of current deficits    Functional mobility [x]  ROM [] Strength [x]  Cognition []  ADLs [x]   IADLs [] Safety Awareness [x] Endurance [x]  Fine Motor Coordination [] Balance [x] Vision/perception [] Sensation []   Gross Motor Coordination []     Treatment frequency:2-5x/week     Plan of Care:   ADL retraining [x]   Equipment needs [x]   Neuromuscular re-education [] Energy Conservation Techniques []  Functional Transfer training [x] Patient and/or Family Education [x]  Functional Mobility training [x]  Environmental Modifications []  Cognitive re-training []   Compensatory techniques for ADLs []  Splinting Needs []                             Therapeutic Activities [x]  Positioning/joint protection []             Therapeutic Strengthening  [x]      Other: []      Rehab Potential: Good    Patient / Family Goal: Return Home     Short term goals  Time Frame: 2 weeks     1. Func xfers mod i  2. Func mobility mod I   3. Perform LE Dressing mod I   4. Perform grooming task at standing level mod I     Eval Complexity: Low     Patient and/or family educated on safety awareness  .  Patient and/or family understands OT  plan of care: NO family present     Time in: 46  Time out: [facility] OTR/L [id number]

## 2018-08-07 NOTE — ED PROVIDER NOTES
no cough  GI: no abd pain, no nausea, vomiting, or diarrhea  : no dysuria, urgency, change in color  MS: no back pain, joint pain, no falls, no trauma   Skin: no rash, no itch  Neuro: no dizziness, headache, no focal paralysis or parasthesia  Psych: no hallucinations, no depression  Heme: no bruising, no bleeding  Other relevant systems reviewed and negative    Physical brief exam: See HPI for other details  Details in electronic record may supersede details below    Vital signs reviewed, please refer to nurses note  Constitutionall: No distress, warm, dry, well nourished, nontoxic  HENT:  NC AT, no deformity, no bleeding of gums  Eyes:  EOMI, nl lids and conjunctiva   Resp:  normal resp rate, no resp distress, no stridor  CVS:  no JVD, no visible heave  GI:  no outward sign peritonitis or splinting, non distended  Musc-Skel:  full range of motion, normal appearing, no deformities/edema/ ecchymosis  Skin:  warm and dry, normal turgor, no rashes   Neurologic: awake and alert, cooperative, Cranial Nerves II-XII grossly intact, motor& sensory grossly intact x4   Psychiatric: orientated x3, answers questions appropriately, judgment & affect grossly appropriate  Heme/ Lymph: no visible adenopathy, ecchymosis, pettichiae    Procedures/Radiology:      ------------------------ NURSING NOTES AND VITALS REVIEWED ---------------------------  Date / Time Roomed:  8/7/2018  3:13 AM  ED Bed Assignment:  17/17    The nursing notes within the ED encounter and vital signs as below have been reviewed. Patient Vitals for the past 24 hrs:   BP Temp Temp src Pulse Resp SpO2 Height Weight   08/07/18 0323 (!) 153/54 - - - - - - -   08/07/18 0322 - 98.1 °F (36.7 °C) Oral 68 16 96 % 5' 8\" (1.727 m) 200 lb (90.7 kg)       Oxygen Saturation Interpretation: Normal      Assessment and Plan:  Will admit for near syncope      Impression: Near Syncope      I have reviewed the patient's medications, vital signs, and diagnostic studies available to me in the medical record at the time of the patient encounter.           Cricket Hodges MD  08/07/18 8156

## 2018-08-08 VITALS
SYSTOLIC BLOOD PRESSURE: 161 MMHG | RESPIRATION RATE: 18 BRPM | WEIGHT: 212.4 LBS | TEMPERATURE: 98.9 F | BODY MASS INDEX: 32.19 KG/M2 | DIASTOLIC BLOOD PRESSURE: 65 MMHG | OXYGEN SATURATION: 95 % | HEIGHT: 68 IN | HEART RATE: 85 BPM

## 2018-08-08 LAB
ALBUMIN SERPL-MCNC: 3.3 G/DL (ref 3.5–5.2)
ALP BLD-CCNC: 82 U/L (ref 35–104)
ALT SERPL-CCNC: 14 U/L (ref 0–32)
ANION GAP SERPL CALCULATED.3IONS-SCNC: 11 MMOL/L (ref 7–16)
AST SERPL-CCNC: 13 U/L (ref 0–31)
BILIRUB SERPL-MCNC: 0.4 MG/DL (ref 0–1.2)
BUN BLDV-MCNC: 11 MG/DL (ref 8–23)
CALCIUM SERPL-MCNC: 8.9 MG/DL (ref 8.6–10.2)
CHLORIDE BLD-SCNC: 105 MMOL/L (ref 98–107)
CO2: 24 MMOL/L (ref 22–29)
CREAT SERPL-MCNC: 0.7 MG/DL (ref 0.5–1)
EKG ATRIAL RATE: 72 BPM
EKG P AXIS: 69 DEGREES
EKG P-R INTERVAL: 156 MS
EKG Q-T INTERVAL: 394 MS
EKG QRS DURATION: 74 MS
EKG QTC CALCULATION (BAZETT): 431 MS
EKG R AXIS: 27 DEGREES
EKG T AXIS: 52 DEGREES
EKG VENTRICULAR RATE: 72 BPM
GFR AFRICAN AMERICAN: >60
GFR NON-AFRICAN AMERICAN: >60 ML/MIN/1.73
GLUCOSE BLD-MCNC: 145 MG/DL (ref 74–109)
HCT VFR BLD CALC: 34.8 % (ref 34–48)
HEMOGLOBIN: 11.3 G/DL (ref 11.5–15.5)
MCH RBC QN AUTO: 29.9 PG (ref 26–35)
MCHC RBC AUTO-ENTMCNC: 32.5 % (ref 32–34.5)
MCV RBC AUTO: 92.1 FL (ref 80–99.9)
METER GLUCOSE: 155 MG/DL (ref 70–110)
METER GLUCOSE: 155 MG/DL (ref 70–110)
PDW BLD-RTO: 12.3 FL (ref 11.5–15)
PLATELET # BLD: 223 E9/L (ref 130–450)
PMV BLD AUTO: 9.1 FL (ref 7–12)
POTASSIUM SERPL-SCNC: 4.1 MMOL/L (ref 3.5–5)
RBC # BLD: 3.78 E12/L (ref 3.5–5.5)
SODIUM BLD-SCNC: 140 MMOL/L (ref 132–146)
TOTAL PROTEIN: 6 G/DL (ref 6.4–8.3)
WBC # BLD: 9.3 E9/L (ref 4.5–11.5)

## 2018-08-08 PROCEDURE — 6370000000 HC RX 637 (ALT 250 FOR IP): Performed by: INTERNAL MEDICINE

## 2018-08-08 PROCEDURE — 82962 GLUCOSE BLOOD TEST: CPT

## 2018-08-08 PROCEDURE — G0378 HOSPITAL OBSERVATION PER HR: HCPCS

## 2018-08-08 PROCEDURE — 2580000003 HC RX 258: Performed by: INTERNAL MEDICINE

## 2018-08-08 PROCEDURE — 36415 COLL VENOUS BLD VENIPUNCTURE: CPT

## 2018-08-08 PROCEDURE — 6360000002 HC RX W HCPCS: Performed by: INTERNAL MEDICINE

## 2018-08-08 PROCEDURE — 80053 COMPREHEN METABOLIC PANEL: CPT

## 2018-08-08 PROCEDURE — 99232 SBSQ HOSP IP/OBS MODERATE 35: CPT | Performed by: INTERNAL MEDICINE

## 2018-08-08 PROCEDURE — 2700000000 HC OXYGEN THERAPY PER DAY

## 2018-08-08 PROCEDURE — 94640 AIRWAY INHALATION TREATMENT: CPT

## 2018-08-08 PROCEDURE — 96372 THER/PROPH/DIAG INJ SC/IM: CPT

## 2018-08-08 PROCEDURE — 85027 COMPLETE CBC AUTOMATED: CPT

## 2018-08-08 PROCEDURE — APPSS60 APP SPLIT SHARED TIME 46-60 MINUTES: Performed by: NURSE PRACTITIONER

## 2018-08-08 RX ORDER — BENAZEPRIL HYDROCHLORIDE 10 MG/1
10 TABLET ORAL NIGHTLY
Qty: 30 TABLET | Refills: 3 | Status: ON HOLD | OUTPATIENT
Start: 2018-08-08

## 2018-08-08 RX ORDER — PHENELZINE SULFATE 15 MG/1
15 TABLET ORAL 3 TIMES DAILY
Qty: 90 TABLET | Refills: 3 | Status: ON HOLD | OUTPATIENT
Start: 2018-08-08

## 2018-08-08 RX ADMIN — MOMETASONE FUROATE AND FORMOTEROL FUMARATE DIHYDRATE 2 PUFF: 200; 5 AEROSOL RESPIRATORY (INHALATION) at 09:07

## 2018-08-08 RX ADMIN — LISINOPRIL 10 MG: 10 TABLET ORAL at 08:53

## 2018-08-08 RX ADMIN — RISPERIDONE 1 MG: 1 TABLET ORAL at 08:53

## 2018-08-08 RX ADMIN — GLIMEPIRIDE 2 MG: 2 TABLET ORAL at 08:53

## 2018-08-08 RX ADMIN — LINAGLIPTIN 5 MG: 5 TABLET, FILM COATED ORAL at 08:53

## 2018-08-08 RX ADMIN — CYANOCOBALAMIN TAB 1000 MCG 500 MCG: 1000 TAB at 08:53

## 2018-08-08 RX ADMIN — ENOXAPARIN SODIUM 40 MG: 40 INJECTION, SOLUTION INTRAVENOUS; SUBCUTANEOUS at 08:53

## 2018-08-08 RX ADMIN — INSULIN LISPRO 1 UNITS: 100 INJECTION, SOLUTION INTRAVENOUS; SUBCUTANEOUS at 08:52

## 2018-08-08 RX ADMIN — INSULIN LISPRO 1 UNITS: 100 INJECTION, SOLUTION INTRAVENOUS; SUBCUTANEOUS at 12:13

## 2018-08-08 RX ADMIN — HYDROCODONE BITARTRATE AND ACETAMINOPHEN 1 TABLET: 5; 325 TABLET ORAL at 08:53

## 2018-08-08 RX ADMIN — SODIUM CHLORIDE: 9 INJECTION, SOLUTION INTRAVENOUS at 08:58

## 2018-08-08 RX ADMIN — HYDROCODONE BITARTRATE AND ACETAMINOPHEN 1 TABLET: 5; 325 TABLET ORAL at 02:56

## 2018-08-08 RX ADMIN — FAMOTIDINE 40 MG: 20 TABLET, FILM COATED ORAL at 08:53

## 2018-08-08 ASSESSMENT — PAIN SCALES - GENERAL
PAINLEVEL_OUTOF10: 6
PAINLEVEL_OUTOF10: 4
PAINLEVEL_OUTOF10: 0
PAINLEVEL_OUTOF10: 0
PAINLEVEL_OUTOF10: 8

## 2018-08-08 ASSESSMENT — PAIN DESCRIPTION - FREQUENCY: FREQUENCY: CONTINUOUS

## 2018-08-08 ASSESSMENT — PAIN DESCRIPTION - LOCATION
LOCATION: SHOULDER
LOCATION: SHOULDER

## 2018-08-08 ASSESSMENT — PAIN DESCRIPTION - PAIN TYPE
TYPE: ACUTE PAIN
TYPE: ACUTE PAIN

## 2018-08-08 ASSESSMENT — PAIN DESCRIPTION - DESCRIPTORS: DESCRIPTORS: ACHING;DISCOMFORT;SORE

## 2018-08-08 ASSESSMENT — PAIN DESCRIPTION - PROGRESSION: CLINICAL_PROGRESSION: GRADUALLY WORSENING

## 2018-08-08 ASSESSMENT — PAIN DESCRIPTION - ORIENTATION
ORIENTATION: LEFT
ORIENTATION: LEFT

## 2018-08-08 ASSESSMENT — PAIN DESCRIPTION - ONSET: ONSET: ON-GOING

## 2018-08-08 NOTE — PROGRESS NOTES
P Quality Flow/Interdisciplinary Rounds Progress Note        Quality Flow Rounds held on August 8, 2018    Disciplines Attending:  Bedside Nurse, ,  and Nursing Unit Leadership    Bucky Thao was admitted on 8/7/2018  3:13 AM    Anticipated Discharge Date:  Expected Discharge Date: 08/09/18    Disposition:    Immanuel Score:  Immanuel Scale Score: 20    Readmission Risk              Risk of Unplanned Readmission:        16             Discussed patient goal for the day, patient clinical progression, and barriers to discharge.   The following Goal(s) of the Day/Commitment(s) have been identified:  Discharge planning      [facility]  August 8, 2018

## 2018-08-08 NOTE — HOME CARE
[address] [facility] 9 referral received, awaiting final orders. Spoke with patient and verified demographics. Will follow. Mayank Bundy LPN [address] [facility] 9.

## 2018-08-08 NOTE — DISCHARGE SUMMARY
Physician Discharge Summary     Patient ID:  Yuki Kaba  [id number]  8 y.o.  1950    Admit date: 8/7/2018    Discharge date and time: 8/8/2018  3:34 PM     Admission Diagnoses: Near syncope [R55]  Near syncope [R55]    Discharge Diagnoses:     1. Orthostatic hypotension due to dehydration and medication effect     2. History of sick sinus syndrome status post permanent pacemaker     3. Structural chest pain     4. Diabetes mellitus type II, controlled     5.  COPD     6. Essential hypertension       Consults: cardiology    Procedures: none    Laboratory:   Last 3 BMP  Recent Labs      08/07/18 0347 08/08/18   0345   NA  136  140   K  3.8  4.1   CL  96*  105   CO2  29  24   BUN  16  11   CREATININE  0.8  0.7   GLUCOSE  158*  145*   CALCIUM  9.5  8.9       Last 3 CBC:  Recent Labs      08/07/18 0347 08/08/18 0345   WBC  10.0  9.3   RBC  4.09  3.78   HGB  12.0  11.3*   HCT  36.2  34.8   MCV  88.5  92.1   MCH  29.3  29.9   MCHC  33.1  32.5   RDW  12.0  12.3   PLT  257  223   MPV  9.1  9.1       [employer]   Order: [id number]   Status:  Edited Result - FINAL   Visible to patient:  No (Not Released) Next appt:  09/13/2018 at 08:10 AM in Cardiac Electrophysiology [facility] Check)   Details     Reading Physician Reading Date Result Priority   Tyrese Hernandes MD 8/6/2016    Narrative     Transthoracic Echocardiography Report (TTE)     Demographics      Patient Name     Carmen Royal Gender              Female                     A      Medical Record    [id number]         Room Number         0618   Number      Account #         [de-identified]       Procedure Date      08/06/2016      Corporate ID                       Ordering Physician Jeannine Song MD      Accession Number  [id number]        Referring Physician Santa Bryant MD      Date of Birth     1950       Sonographer         Dao Frances RDCS      Age               65 year(s)

## 2018-09-13 ENCOUNTER — NURSE ONLY (OUTPATIENT)
Dept: NON INVASIVE DIAGNOSTICS | Age: 68
End: 2018-09-13
Payer: MEDICARE

## 2018-09-13 DIAGNOSIS — Z95.0 PACEMAKER: Primary | ICD-10-CM

## 2018-09-13 DIAGNOSIS — I49.5 SSS (SICK SINUS SYNDROME) (HCC): ICD-10-CM

## 2018-09-13 PROCEDURE — 93296 REM INTERROG EVL PM/IDS: CPT | Performed by: INTERNAL MEDICINE

## 2018-09-13 PROCEDURE — 93294 REM INTERROG EVL PM/LDLS PM: CPT | Performed by: INTERNAL MEDICINE

## 2018-09-13 NOTE — PROGRESS NOTES
See [employer] [address] report. Remote monitoring reviewed over a 90 day period. End of 90 day monitoring period date of service 9-13-18. SIC = 429 since 6-14-18. Lead trends stable.      Gregg Shah RN, BSN  [facility]

## 2018-09-14 NOTE — PROGRESS NOTES
I have personally reviewed the remote pacemaker interrogation data. Please see the attached report for details. Stable battery and lead parameters.   1 NSVT episode ~3s  1 PAT episode ~8s  - Continue follow up as recommended    Sumaya España MD, [address]  [facility]  [facility]: [place] [facility]  2:12 PM  9/14/2018

## 2018-09-17 ENCOUNTER — TELEPHONE (OUTPATIENT)
Dept: NON INVASIVE DIAGNOSTICS | Age: 68
End: 2018-09-17

## 2018-12-13 ENCOUNTER — NURSE ONLY (OUTPATIENT)
Dept: NON INVASIVE DIAGNOSTICS | Age: 68
End: 2018-12-13
Payer: MEDICARE

## 2018-12-13 DIAGNOSIS — I49.5 SSS (SICK SINUS SYNDROME) (HCC): ICD-10-CM

## 2018-12-13 DIAGNOSIS — Z95.0 PACEMAKER: Primary | ICD-10-CM

## 2018-12-13 PROCEDURE — 93296 REM INTERROG EVL PM/IDS: CPT | Performed by: INTERNAL MEDICINE

## 2018-12-13 PROCEDURE — 93294 REM INTERROG EVL PM/LDLS PM: CPT | Performed by: INTERNAL MEDICINE

## 2018-12-18 ENCOUNTER — TELEPHONE (OUTPATIENT)
Dept: NON INVASIVE DIAGNOSTICS | Age: 68
End: 2018-12-18

## 2018-12-18 NOTE — TELEPHONE ENCOUNTER
----- Message from Dominick Wilson RN sent at 12/18/2018  3:11 PM EST -----  Successful transmission received. Please call patient and give next appointment.

## 2018-12-18 NOTE — TELEPHONE ENCOUNTER
Spoke with pt and verified next remote transmission from home. We have received your remote transmission. Our staff will contact you if there is anything that needs to be discussed. Your next appointment is 03/15/2019 remote transmission from home.

## 2018-12-18 NOTE — PROGRESS NOTES
See [employer] [address] report. Remote monitoring reviewed over a 90 day period. End of 90 day monitoring period date of service 12.13.2018.

## 2019-03-26 ENCOUNTER — TELEPHONE (OUTPATIENT)
Dept: NON INVASIVE DIAGNOSTICS | Age: 69
End: 2019-03-26

## 2019-05-10 ENCOUNTER — NURSE ONLY (OUTPATIENT)
Dept: NON INVASIVE DIAGNOSTICS | Age: 69
End: 2019-05-10
Payer: MEDICARE

## 2019-05-10 DIAGNOSIS — Z95.0 PACEMAKER: Primary | ICD-10-CM

## 2019-05-10 DIAGNOSIS — I49.5 SSS (SICK SINUS SYNDROME) (HCC): ICD-10-CM

## 2019-05-10 DIAGNOSIS — R55 SYNCOPE AND COLLAPSE: ICD-10-CM

## 2019-05-10 PROCEDURE — 93000 ELECTROCARDIOGRAM COMPLETE: CPT | Performed by: INTERNAL MEDICINE

## 2019-05-10 NOTE — PROGRESS NOTES
Patient was in today for EKG, patient wanted ekg to take to Dr Justina Mckay. Patient will call to schedule f/u appt when she gets home in-front of her calender. Former [facility] patient.     Cletis Frankel, [address]

## 2019-06-07 ENCOUNTER — PROCEDURE VISIT (OUTPATIENT)
Dept: PODIATRY | Age: 69
End: 2019-06-07
Payer: MEDICARE

## 2019-06-07 VITALS
SYSTOLIC BLOOD PRESSURE: 116 MMHG | HEIGHT: 69 IN | WEIGHT: 213 LBS | DIASTOLIC BLOOD PRESSURE: 72 MMHG | BODY MASS INDEX: 31.55 KG/M2

## 2019-06-07 DIAGNOSIS — E11.51 TYPE II DIABETES MELLITUS WITH PERIPHERAL CIRCULATORY DISORDER (HCC): ICD-10-CM

## 2019-06-07 DIAGNOSIS — R26.2 DIFFICULTY WALKING: ICD-10-CM

## 2019-06-07 DIAGNOSIS — M79.675 PAIN OF TOE OF LEFT FOOT: ICD-10-CM

## 2019-06-07 DIAGNOSIS — I73.9 PVD (PERIPHERAL VASCULAR DISEASE) (HCC): ICD-10-CM

## 2019-06-07 DIAGNOSIS — B35.1 ONYCHOMYCOSIS: Primary | ICD-10-CM

## 2019-06-07 DIAGNOSIS — M79.674 PAIN OF TOE OF RIGHT FOOT: ICD-10-CM

## 2019-06-07 PROCEDURE — 11721 DEBRIDE NAIL 6 OR MORE: CPT | Performed by: PODIATRIST

## 2019-06-07 NOTE — PROGRESS NOTES
Patient in today for nail care. Patient does not have any complaints of pain at this time.  Patient's PCP is Alexandrea Cavanaugh MD date of last ov 3-4-19     Alena Gomes LPN

## 2019-06-07 NOTE — PROGRESS NOTES
19     Monik Wall    : 1950  Sex: female  Age: 76 y.o. Subjective: The patient is seen today for evaluation regarding diabetic foot evaluation and mycotic nail care. No other complaints noted. Chief Complaint   Patient presents with    Nail Problem     nail care       Current Medications:    Current Outpatient Medications:     phenelzine (NARDIL) 15 MG tablet, Take 1 tablet by mouth 3 times daily, Disp: 90 tablet, Rfl: 3    benazepril (LOTENSIN) 10 MG tablet, Take 1 tablet by mouth nightly, Disp: 30 tablet, Rfl: 3    vitamin B-12 (CYANOCOBALAMIN) 500 MCG tablet, Take 500 mcg by mouth daily, Disp: , Rfl:     promethazine (PHENERGAN) 25 MG tablet, Take 25 mg by mouth 3 times daily, Disp: , Rfl:     famotidine (PEPCID) 40 MG tablet, Take 40 mg by mouth daily, Disp: , Rfl:     TRADJENTA 5 MG tablet, Take 5 mg by mouth daily , Disp: , Rfl:     torsemide (DEMADEX) 20 MG tablet, Take 20 mg by mouth as needed , Disp: , Rfl:     risperiDONE (RISPERDAL) 1 MG tablet, Take 1 mg by mouth daily , Disp: , Rfl:     Polyethylene Glycol 3350 (MIRALAX PO), Take by mouth as needed , Disp: , Rfl:     gabapentin (NEURONTIN) 300 MG capsule, Take 300 mg by mouth nightly , Disp: , Rfl:     glimepiride (AMARYL) 1 MG tablet, Take 2 mg by mouth 2 times daily , Disp: , Rfl:     fluticasone-vilanterol (BREO ELLIPTA) 100-25 MCG/INH AEPB, Inhale into the lungs, Disp: , Rfl:     LORazepam (ATIVAN) 1 MG tablet, Take 1 mg by mouth every 6 hours as needed for Anxiety (takes up to 5 tablets daily). , Disp: , Rfl:     ARIPiprazole (ABILIFY) 10 MG tablet, Take 10 mg by mouth nightly , Disp: , Rfl:     aspirin 325 MG tablet, Take 325 mg by mouth nightly , Disp: , Rfl:     Cholecalciferol (VITAMIN D-3 PO), Take 2,000 Units by mouth nightly , Disp: , Rfl:     BIOTIN PO, Take 1,000 mcg by mouth nightly , Disp: , Rfl:     Melatonin 10 MG TABS, Take by mouth nightly , Disp: , Rfl:     Levalbuterol Tartrate proper caring for the vascular compromised foot. The fact that they have compromised blood flow put the patient at risk for infection/gangrene/amputation. The patient should not walk barefoot. Shoe gear should fit properly and socks should be worn with shoes. Exercise is very important to prevent worsening of the disease process but before performing an exercise program should check with their family physician first.  If any skin lesions are noted, they are instructed to contact the office immediately. 5. It was discussed in detail with the patient proper hygiene for the diabetic foot. They are to get in the habit of looking at their feet or have someone look at them. If they are unable to do daily, they are to look for any signs of redness, blistering, cracking, swelling, drainage, open lesions, etc.  They are to dry in between the toes after each bath or shower gently. The water should be tested with the elbow to prevent burns. The patient is to refrain from soaking their feet unless instructed by myself to do otherwise. They are to refrain from going barefoot. Shoe gear should be inspected for any foreign objects. Shoes should have a deep wide toe box. With any type of shoe, the feet should be inspected for any signs of pressure, i.e. redness, blistering, or open sores. Further instructional guidelines were dispensed to the patient. 6. We will see the patient back at a later date for continued podiatric management and care. Patient was advised to call the office with any questions or concerns prior to their next appointment if needed. Seen By:    Meghann Boles DPM    Electronically signed by Meghann Boles DPM on 6/7/2019 at 7:31 PM      This note was created using voice recognition software. The note was reviewed however may contain grammatical errors.

## 2019-06-27 ENCOUNTER — HOSPITAL ENCOUNTER (OUTPATIENT)
Age: 69
Discharge: HOME | End: 2019-06-29

## 2019-06-27 PROCEDURE — 88305 TISSUE EXAM BY PATHOLOGIST: CPT

## 2019-07-05 ENCOUNTER — TELEPHONE (OUTPATIENT)
Dept: ADMINISTRATIVE | Age: 69
End: 2019-07-05

## 2019-07-05 NOTE — TELEPHONE ENCOUNTER
Pt called to see if there was any word on when she would be scheduled with EP.   She states she is feeling good, has no concerns but was just wondering when the appt might be. [phone]

## 2019-08-09 ENCOUNTER — PROCEDURE VISIT (OUTPATIENT)
Dept: PODIATRY | Age: 69
End: 2019-08-09
Payer: MEDICARE

## 2019-08-09 VITALS — HEIGHT: 69 IN | RESPIRATION RATE: 18 BRPM | BODY MASS INDEX: 30.81 KG/M2 | WEIGHT: 208 LBS

## 2019-08-09 DIAGNOSIS — M79.674 PAIN OF TOE OF RIGHT FOOT: ICD-10-CM

## 2019-08-09 DIAGNOSIS — E11.51 TYPE II DIABETES MELLITUS WITH PERIPHERAL CIRCULATORY DISORDER (HCC): ICD-10-CM

## 2019-08-09 DIAGNOSIS — B35.1 ONYCHOMYCOSIS: Primary | ICD-10-CM

## 2019-08-09 DIAGNOSIS — M79.675 PAIN OF TOE OF LEFT FOOT: ICD-10-CM

## 2019-08-09 DIAGNOSIS — R26.2 DIFFICULTY WALKING: ICD-10-CM

## 2019-08-09 DIAGNOSIS — I73.9 PVD (PERIPHERAL VASCULAR DISEASE) (HCC): ICD-10-CM

## 2019-08-09 PROCEDURE — 11721 DEBRIDE NAIL 6 OR MORE: CPT | Performed by: PODIATRIST

## 2019-10-03 ENCOUNTER — PREP FOR PROCEDURE (OUTPATIENT)
Dept: SURGERY | Age: 69
End: 2019-10-03

## 2019-10-03 RX ORDER — SODIUM CHLORIDE 9 MG/ML
INJECTION, SOLUTION INTRAVENOUS CONTINUOUS
Status: CANCELLED | OUTPATIENT
Start: 2019-10-03

## 2020-01-30 ENCOUNTER — NURSE ONLY (OUTPATIENT)
Dept: NON INVASIVE DIAGNOSTICS | Age: 70
End: 2020-01-30
Payer: MEDICARE

## 2020-01-30 PROCEDURE — 93296 REM INTERROG EVL PM/IDS: CPT | Performed by: INTERNAL MEDICINE

## 2020-01-30 PROCEDURE — 93294 REM INTERROG EVL PM/LDLS PM: CPT | Performed by: INTERNAL MEDICINE

## 2020-02-27 ENCOUNTER — HOSPITAL ENCOUNTER (OUTPATIENT)
Age: 70
Discharge: HOME | End: 2020-02-29

## 2020-02-27 PROCEDURE — 88342 IMHCHEM/IMCYTCHM 1ST ANTB: CPT

## 2020-02-27 PROCEDURE — 88305 TISSUE EXAM BY PATHOLOGIST: CPT

## 2020-04-02 ENCOUNTER — TELEPHONE (OUTPATIENT)
Dept: NON INVASIVE DIAGNOSTICS | Age: 70
End: 2020-04-02

## 2020-04-02 NOTE — TELEPHONE ENCOUNTER
The patient called asking to reschedule her missed 03/12/20 appointment. I offered her a virtual visit, which she declined. Patient declined virtual visit options understanding at this time we will not be able to schedule them until after May 26 which is also subject to change.

## 2020-05-05 ENCOUNTER — NURSE ONLY (OUTPATIENT)
Dept: NON INVASIVE DIAGNOSTICS | Age: 70
End: 2020-05-05
Payer: MEDICARE

## 2020-05-05 PROCEDURE — 93296 REM INTERROG EVL PM/IDS: CPT | Performed by: INTERNAL MEDICINE

## 2020-05-05 PROCEDURE — 93294 REM INTERROG EVL PM/LDLS PM: CPT | Performed by: INTERNAL MEDICINE

## 2020-05-07 NOTE — PROGRESS NOTES
See [employer] [address] report. Remote monitoring reviewed over a 90 day period.   End of 90 day monitoring period date of service 05/05/2020

## 2020-06-10 ENCOUNTER — OFFICE VISIT (OUTPATIENT)
Dept: PODIATRY | Age: 70
End: 2020-06-10
Payer: MEDICARE

## 2020-06-10 VITALS — BODY MASS INDEX: 31.1 KG/M2 | WEIGHT: 210 LBS | HEIGHT: 69 IN

## 2020-06-10 PROCEDURE — 11721 DEBRIDE NAIL 6 OR MORE: CPT | Performed by: PODIATRIST

## 2020-06-10 NOTE — PROGRESS NOTES
([address]    Difficulty walking        1. Evaluation and Management  2. Manual and electrical debridement of the mycotic nails was performed for thickness and length to prevent injection and/or ulceration. 3. I recommended antifungal cream to the nails daily. 4. It was discussed in detail with the patient proper caring for the vascular compromised foot. The fact that they have compromised blood flow put the patient at risk for infection/gangrene/amputation. The patient should not walk barefoot. Shoe gear should fit properly and socks should be worn with shoes. Exercise is very important to prevent worsening of the disease process but before performing an exercise program should check with their family physician first.  If any skin lesions are noted, they are instructed to contact the office immediately. 5. It was discussed in detail with the patient proper hygiene for the diabetic foot. They are to get in the habit of looking at their feet or have someone look at them. If they are unable to do daily, they are to look for any signs of redness, blistering, cracking, swelling, drainage, open lesions, etc.  They are to dry in between the toes after each bath or shower gently. The water should be tested with the elbow to prevent burns. The patient is to refrain from soaking their feet unless instructed by myself to do otherwise. They are to refrain from going barefoot. Shoe gear should be inspected for any foreign objects. Shoes should have a deep wide toe box. With any type of shoe, the feet should be inspected for any signs of pressure, i.e. redness, blistering, or open sores. Further instructional guidelines were dispensed to the patient. 6. We will see the patient back at a later date for continued podiatric management and care. Patient was advised to call the office with any questions or concerns prior to their next appointment if needed.         Seen By:    Han Mercado PREMA    Electronically signed by Scott Foster DPM on 6/10/2020 at 7:38 PM      This note was created using voice recognition software. The note was reviewed however may contain grammatical errors.

## 2020-06-17 ENCOUNTER — OFFICE VISIT (OUTPATIENT)
Dept: PODIATRY | Age: 70
End: 2020-06-17
Payer: MEDICARE

## 2020-06-17 VITALS
WEIGHT: 210 LBS | BODY MASS INDEX: 31.1 KG/M2 | SYSTOLIC BLOOD PRESSURE: 138 MMHG | DIASTOLIC BLOOD PRESSURE: 80 MMHG | HEIGHT: 69 IN

## 2020-06-17 PROBLEM — S90.111A CONTUSION OF RIGHT GREAT TOE WITHOUT DAMAGE TO NAIL: Status: ACTIVE | Noted: 2020-06-17

## 2020-06-17 PROCEDURE — 4040F PNEUMOC VAC/ADMIN/RCVD: CPT | Performed by: PODIATRIST

## 2020-06-17 PROCEDURE — G8417 CALC BMI ABV UP PARAM F/U: HCPCS | Performed by: PODIATRIST

## 2020-06-17 PROCEDURE — G8400 PT W/DXA NO RESULTS DOC: HCPCS | Performed by: PODIATRIST

## 2020-06-17 PROCEDURE — G8427 DOCREV CUR MEDS BY ELIG CLIN: HCPCS | Performed by: PODIATRIST

## 2020-06-17 PROCEDURE — 2022F DILAT RTA XM EVC RTNOPTHY: CPT | Performed by: PODIATRIST

## 2020-06-17 PROCEDURE — 1123F ACP DISCUSS/DSCN MKR DOCD: CPT | Performed by: PODIATRIST

## 2020-06-17 PROCEDURE — 3046F HEMOGLOBIN A1C LEVEL >9.0%: CPT | Performed by: PODIATRIST

## 2020-06-17 PROCEDURE — 3017F COLORECTAL CA SCREEN DOC REV: CPT | Performed by: PODIATRIST

## 2020-06-17 PROCEDURE — 1090F PRES/ABSN URINE INCON ASSESS: CPT | Performed by: PODIATRIST

## 2020-06-17 PROCEDURE — 99213 OFFICE O/P EST LOW 20 MIN: CPT | Performed by: PODIATRIST

## 2020-06-17 PROCEDURE — 1036F TOBACCO NON-USER: CPT | Performed by: PODIATRIST

## 2020-06-17 NOTE — PROGRESS NOTES
Patient is here today for follow up evaluation of great right toe.
open lesions, etc.  They are to dry in between the toes after each bath or shower gently. The water should be tested with the elbow to prevent burns. The patient is to refrain from soaking their feet unless instructed by myself to do otherwise. They are to refrain from going barefoot. Shoe gear should be inspected for any foreign objects. Shoes should have a deep wide toe box. With any type of shoe, the feet should be inspected for any signs of pressure, i.e. redness, blistering, or open sores. Further instructional guidelines were dispensed to the patient. Patient will be followed up at her regularly scheduled diabetic foot care appointment or sooner if needed. Seen By:    Jose Martin De Leon DPM    Electronically signed by Jose Martin De Leon DPM on 6/17/2020 at 10:49 AM    This note was created using voice recognition software. The note was reviewed however may contain grammatical errors.

## 2020-08-12 ENCOUNTER — PROCEDURE VISIT (OUTPATIENT)
Dept: PODIATRY | Age: 70
End: 2020-08-12
Payer: MEDICARE

## 2020-08-12 VITALS — HEIGHT: 69 IN | WEIGHT: 210 LBS | BODY MASS INDEX: 31.1 KG/M2 | TEMPERATURE: 97.2 F

## 2020-08-12 PROCEDURE — 11721 DEBRIDE NAIL 6 OR MORE: CPT | Performed by: PODIATRIST

## 2020-08-12 NOTE — PROGRESS NOTES
and electrical debridement of the mycotic nails was performed for thickness and length to prevent injection and/or ulceration. 3. I recommended antifungal cream to the nails daily. 4. It was discussed in detail with the patient proper caring for the vascular compromised foot. The fact that they have compromised blood flow put the patient at risk for infection/gangrene/amputation. The patient should not walk barefoot. Shoe gear should fit properly and socks should be worn with shoes. Exercise is very important to prevent worsening of the disease process but before performing an exercise program should check with their family physician first.  If any skin lesions are noted, they are instructed to contact the office immediately. 5. It was discussed in detail with the patient proper hygiene for the diabetic foot. They are to get in the habit of looking at their feet or have someone look at them. If they are unable to do daily, they are to look for any signs of redness, blistering, cracking, swelling, drainage, open lesions, etc.  They are to dry in between the toes after each bath or shower gently. The water should be tested with the elbow to prevent burns. The patient is to refrain from soaking their feet unless instructed by myself to do otherwise. They are to refrain from going barefoot. Shoe gear should be inspected for any foreign objects. Shoes should have a deep wide toe box. With any type of shoe, the feet should be inspected for any signs of pressure, i.e. redness, blistering, or open sores. Further instructional guidelines were dispensed to the patient. 6. We will see the patient back at a later date for continued podiatric management and care. Patient was advised to call the office with any questions or concerns prior to their next appointment if needed.         Seen By:    Joel Maxwell DPM    Electronically signed by Joel Maxwell DPM on 8/12/2020 at 11:18 AM      This note was created using voice recognition software. The note was reviewed however may contain grammatical errors.

## 2020-08-12 NOTE — PROGRESS NOTES
Patient in today for nail care. Patient does not have any complaints of pain at this time.  Patient's PCP is Kimberly Noland MD date of last ov 5-        Vianney Nash LPN

## 2020-09-08 ENCOUNTER — TELEPHONE (OUTPATIENT)
Dept: ADMINISTRATIVE | Age: 70
End: 2020-09-08

## 2020-09-08 NOTE — TELEPHONE ENCOUNTER
Pt called to state she had received a letter to schedule EP appt. Pt has not seen an EP since AA left.  Please call her at [phone] ok to LM

## 2020-10-14 ENCOUNTER — PROCEDURE VISIT (OUTPATIENT)
Dept: PODIATRY | Age: 70
End: 2020-10-14
Payer: MEDICARE

## 2020-10-14 VITALS
SYSTOLIC BLOOD PRESSURE: 130 MMHG | WEIGHT: 210 LBS | BODY MASS INDEX: 31.1 KG/M2 | TEMPERATURE: 97.3 F | DIASTOLIC BLOOD PRESSURE: 82 MMHG | HEIGHT: 69 IN

## 2020-10-14 PROCEDURE — 11721 DEBRIDE NAIL 6 OR MORE: CPT | Performed by: PODIATRIST

## 2020-10-14 NOTE — PROGRESS NOTES
Patient in today for nail care. Patient does not have any complaints of pain at this time.  Patient's PCP is Barry De Leon MD date of last ov   9-8-2020      Dione Kuhn LPN

## 2020-10-14 NOTE — PROGRESS NOTES
10/14/20     Peter Alarcon    : 1950  Sex: female  Age: 79 y.o. Subjective: The patient is seen today for evaluation regarding diabetic foot evaluation and mycotic nail care. No other complaints noted. Chief Complaint   Patient presents with    Nail Problem       Current Medications:    Current Outpatient Medications:     azithromycin (ZITHROMAX) 250 MG tablet, Take 250 mg by mouth daily, Disp: , Rfl:     metFORMIN (GLUCOPHAGE-XR) 750 MG extended release tablet, Take 750 mg by mouth Daily with supper, Disp: , Rfl:     omeprazole (PRILOSEC) 40 MG delayed release capsule, Take 40 mg by mouth daily, Disp: , Rfl:     OXYGEN, Inhale 2 L into the lungs nightly as needed, Disp: , Rfl:     phenelzine (NARDIL) 15 MG tablet, Take 1 tablet by mouth 3 times daily, Disp: 90 tablet, Rfl: 3    benazepril (LOTENSIN) 10 MG tablet, Take 1 tablet by mouth nightly, Disp: 30 tablet, Rfl: 3    vitamin B-12 (CYANOCOBALAMIN) 500 MCG tablet, Take 500 mcg by mouth daily, Disp: , Rfl:     promethazine (PHENERGAN) 25 MG tablet, Take 25 mg by mouth every 8 hours as needed , Disp: , Rfl:     torsemide (DEMADEX) 20 MG tablet, Take 20 mg by mouth as needed , Disp: , Rfl:     Polyethylene Glycol 3350 (MIRALAX PO), Take by mouth as needed , Disp: , Rfl:     gabapentin (NEURONTIN) 300 MG capsule, Take 300 mg by mouth nightly , Disp: , Rfl:     glimepiride (AMARYL) 1 MG tablet, Take 2 mg by mouth 2 times daily , Disp: , Rfl:     fluticasone-vilanterol (BREO ELLIPTA) 100-25 MCG/INH AEPB, Inhale into the lungs, Disp: , Rfl:     LORazepam (ATIVAN) 1 MG tablet, Take 1 mg by mouth every 6 hours as needed for Anxiety (takes up to 5 tablets daily). , Disp: , Rfl:     aspirin 325 MG tablet, Take 325 mg by mouth nightly , Disp: , Rfl:     Cholecalciferol (VITAMIN D-3 PO), Take 2,000 Units by mouth nightly , Disp: , Rfl:     BIOTIN PO, Take 1,000 mcg by mouth nightly , Disp: , Rfl:     Melatonin 10 MG TABS, Take by mouth nightly , Disp: , Rfl:     Levalbuterol Tartrate (XOPENEX HFA IN), Inhale 1 Inhaler into the lungs , Disp: , Rfl:     Allergies: Allergies   Allergen Reactions    Penicillins Anaphylaxis and Shortness Of Breath    Sulfa Antibiotics Itching and Swelling       Past Surgical History:   Procedure Laterality Date    APPENDECTOMY      CARDIAC PACEMAKER PLACEMENT  2016     SECTION      x3    HYSTERECTOMY      PACEMAKER PLACEMENT Left 2016    TONSILLECTOMY      TUBAL LIGATION       Past Medical History:   Diagnosis Date    [facility]     Depression     Diabetes [facility]     Hypertension     Neuropathy     PONV (postoperative nausea and vomiting)     Sinus arrest     [facility])     Syncope and collapse        Vitals:    10/14/20 1042   BP: 130/82   Temp: 97.3 °F (36.3 °C)   Weight: 210 lb (95.3 kg)   Height: 5' 9\" (1.753 m)       Exam:  Neurovascular status unchanged. At this time the nail/s 1, 2, 3, 4, 5 right foot and nail/s 1, 2, 3, 4, 5 left foot are noted to be thickened, dystrophic and discolored with subungual debris present. Tenderness noted to palpation. Minimal hair growth is noted to both lower extremities. Edema noted with dependent edematous issues and stasis skin changes present bilaterally. Coolness is noted to the digital regions to palpation. Capillary fill time delayed digital areas bilateral foot. No heel fissuring or macerations of the web spaces. No plantar calluses and/or ulcerative areas are noted. Patient is having difficulty with gait/walking. Plan Per Assessment  Lizzy Herrera was seen today for nail problem. Diagnoses and all orders for this visit:    Onychomycosis    Pain of toe of right foot    Pain of toe of left foot    PVD (peripheral vascular disease) ([facility]    Type II diabetes mellitus with peripheral circulatory disorder (HCC)    Difficulty walking        1.  Evaluation and

## 2020-11-03 NOTE — PROGRESS NOTES
[facility] 04[facility]       Current Outpatient Medications   Medication Sig Dispense Refill    VITAMIN K PO Take 100 mg by mouth daily      tiotropium (SPIRIVA) 18 MCG inhalation capsule Inhale 18 mcg into the lungs daily      albuterol sulfate HFA (VENTOLIN HFA) 108 (90 Base) MCG/ACT inhaler Inhale 2 puffs into the lungs every 6 hours as needed for Wheezing      metFORMIN (GLUCOPHAGE-XR) 750 MG extended release tablet Take 750 mg by mouth Daily with supper      omeprazole (PRILOSEC) 40 MG delayed release capsule Take 40 mg by mouth daily      OXYGEN Inhale 2 L into the lungs nightly as needed      phenelzine (NARDIL) 15 MG tablet Take 1 tablet by mouth 3 times daily 90 tablet 3    benazepril (LOTENSIN) 10 MG tablet Take 1 tablet by mouth nightly 30 tablet 3    vitamin B-12 (CYANOCOBALAMIN) 500 MCG tablet Take 500 mcg by mouth daily      promethazine (PHENERGAN) 25 MG tablet Take 25 mg by mouth every 8 hours as needed       torsemide (DEMADEX) 20 MG tablet Take 20 mg by mouth as needed       Polyethylene Glycol 3350 (MIRALAX PO) Take by mouth as needed       gabapentin (NEURONTIN) 300 MG capsule Take 300 mg by mouth nightly       glimepiride (AMARYL) 1 MG tablet Take 2 mg by mouth 2 times daily       fluticasone-vilanterol (BREO ELLIPTA) 100-25 MCG/INH AEPB Inhale into the lungs      LORazepam (ATIVAN) 1 MG tablet Take 1 mg by mouth every 6 hours as needed for Anxiety (takes up to 5 tablets daily).  aspirin 81 MG EC tablet Take 81 mg by mouth nightly       Cholecalciferol (VITAMIN D-3 PO) Take 2,000 Units by mouth nightly       BIOTIN PO Take 1,000 mcg by mouth nightly       Melatonin 10 MG TABS Take by mouth nightly       Levalbuterol Tartrate (XOPENEX HFA IN) Inhale 1 Inhaler into the lungs        No current facility-administered medications for this visit.          Allergies   Allergen Reactions    Penicillins Anaphylaxis and Shortness Of Breath    Sulfa Antibiotics Itching and Swelling ROS:   Constitutional: Negative for fever, activity change and appetite change. HENT: Negative for epistaxis, difficulty swallowing. Eyes: Negative for blurred vision or double vision. Respiratory: Negative for cough, chest tightness, shortness of breath and wheezing. Cardiovascular: Negative for chest pain, palpitations and leg swelling. Gastrointestinal: Negative for abdominal pain, heartburn, or blood in stool. Genitourinary: Negative for hematuria, burning or frequency. Musculoskeletal: Negative for myalgias, stiffness, or swelling. Skin: Negative for rash, pain, or lumps. Neurological: Negative for syncope, seizures, or headaches. Psychiatric/Behavioral: Negative for confusion and agitation. The patient is not nervous/anxious. PHYSICAL EXAM:  Vitals:    11/04/20 1041   BP: 124/72   Pulse: 78   Resp: 22   Temp: 97.3 °F (36.3 °C)   TempSrc: Temporal   Weight: 221 lb (100.2 kg)   Height: 5' 8\" (1.727 m)     Constitutional: Oriented to person, place, and time. Well-developed and cooperative. Head: Normocephalic and atraumatic. Eyes: Conjunctivae are normal.   Neck:  no JVD present. Cardiovascular: S1 normal, S2 normal and intact distal pulses. A regular rhythm present. Pulmonary/Chest: Effort normal and breath sounds normal. No respiratory distress. Abdominal: Soft. Normal appearance and bowel sounds are normal.    Musculoskeletal: Normal range of motion of all extremities, no muscle weakness. Neurological: Alert and oriented to person, place, and time. Gait normal.   Skin: Skin is warm and dry. No bruising, no ecchymosis and no rash noted. Extremity: No clubbing or cyanosis. No edema. Psychiatric: Normal mood and affect.  Thought content normal.       Pacemaker Interrogation: see attached interrogation 11/4/20   P wave: 0.9 mV  Impedance: 494 ohms   Threshold: 0.75 V @ 0.4 ms  RV R wave: 4.9 mV  Impedance: 494 ohms   Threshold: 0.5 V @ 0.4 ms  Pacing: A: <0.1% RV: <0.1%    Battery Voltage/Longevity:  7 years    Arrhythmias: AF burden: <0.1%  - SVT's, longest 28 seconds, Vrates: 158-188  - VT/NS, looked to be SVT, lasting 1-3 seconds  - 1 VT/NS, looked true, lasting 2 seconds, Vrates: 200bpm  Underlying rhythm: AsVs      Limited TTE 8/6/2016:   Findings      Left Ventricle   Left ventricle size is normal.   Normal left ventricular systolic function.   Ejection fraction is visually estimated at 70%.     Right Ventricle   Normal right ventricular function.      Conclusions      Summary   Limited echocardiogram (reassess ventricular function).   Normal left ventricular systolic function.   Ejection fraction is visually estimated at 70%.   Normal right ventricular function.     Summary:     Janinelibby Khan  pacemaker function is normal    1. Syncope and collapse    2. SSS (sick sinus syndrome) ([address]    3. Pacemaker    4. Obesity (BMI 30.0-34.9)        1. S/p PPM   - Implanted 8/8/2016  - Dual chamber [employer] MRI conditional   - no issues, will continue to monitor     2. Sinus arrest + syncope  - S/p PPM; see # 1   - no syncope since pacer  - some occasional dizziness  - poor PO fluid intake      3. HTN  - well controlled  - continue current medications       4. DM  No results found for: EAG       5. Obesity  - Body mass index is 33.6 kg/m². - recommend weight loss      6. Depression  - per primary     7. NS-VT  - asymptomatic  - EF 70% 8/2016  - unremarkable stress 9/2016  - will monitor        Recommendations:     1). The patient will present back to the office in 12 months for pacemaker follow-up. 2). She will send a remote transmission in 3 months. 3). No changes were made in the device settings today. 4). No changes were made in the patient's medications today. Thank you again for allowing me to participate in their care.       Lian Walker M.D  Cardiac Electrophysiology  [facility]  . [address] [facility]

## 2020-11-04 ENCOUNTER — OFFICE VISIT (OUTPATIENT)
Dept: NON INVASIVE DIAGNOSTICS | Age: 70
End: 2020-11-04
Payer: MEDICARE

## 2020-11-04 VITALS
HEIGHT: 68 IN | RESPIRATION RATE: 22 BRPM | DIASTOLIC BLOOD PRESSURE: 72 MMHG | SYSTOLIC BLOOD PRESSURE: 124 MMHG | WEIGHT: 221 LBS | TEMPERATURE: 97.3 F | BODY MASS INDEX: 33.49 KG/M2 | HEART RATE: 78 BPM

## 2020-11-04 PROCEDURE — 99214 OFFICE O/P EST MOD 30 MIN: CPT | Performed by: INTERNAL MEDICINE

## 2020-11-04 PROCEDURE — 93280 PM DEVICE PROGR EVAL DUAL: CPT | Performed by: INTERNAL MEDICINE

## 2020-11-04 RX ORDER — ALBUTEROL SULFATE 90 UG/1
2 AEROSOL, METERED RESPIRATORY (INHALATION) EVERY 6 HOURS PRN
Status: ON HOLD | COMMUNITY

## 2020-12-18 ENCOUNTER — PROCEDURE VISIT (OUTPATIENT)
Dept: PODIATRY | Age: 70
End: 2020-12-18
Payer: MEDICARE

## 2020-12-18 VITALS — TEMPERATURE: 97.5 F | HEIGHT: 68 IN | BODY MASS INDEX: 33.49 KG/M2 | WEIGHT: 221 LBS

## 2020-12-18 PROCEDURE — 11721 DEBRIDE NAIL 6 OR MORE: CPT | Performed by: PODIATRIST

## 2020-12-18 NOTE — PROGRESS NOTES
Patient in today for nail care. Patient does not have any complaints of pain at this time.  Patient's PCP is Filiberto Carrion MD date of last ov   12-3-2020      Cierra Alegre LPN

## 2020-12-18 NOTE — PROGRESS NOTES
20     Maribel Eaton    : 1950  Sex: female  Age: 79 y.o. Subjective: The patient is seen today for evaluation regarding diabetic foot evaluation and mycotic nail care. No other complaints noted. Chief Complaint   Patient presents with    Nail Problem       Current Medications:    Current Outpatient Medications:     VITAMIN K PO, Take 100 mg by mouth daily, Disp: , Rfl:     tiotropium (SPIRIVA) 18 MCG inhalation capsule, Inhale 18 mcg into the lungs daily, Disp: , Rfl:     albuterol sulfate HFA (VENTOLIN HFA) 108 (90 Base) MCG/ACT inhaler, Inhale 2 puffs into the lungs every 6 hours as needed for Wheezing, Disp: , Rfl:     metFORMIN (GLUCOPHAGE-XR) 750 MG extended release tablet, Take 750 mg by mouth Daily with supper, Disp: , Rfl:     omeprazole (PRILOSEC) 40 MG delayed release capsule, Take 40 mg by mouth daily, Disp: , Rfl:     OXYGEN, Inhale 2 L into the lungs nightly as needed, Disp: , Rfl:     phenelzine (NARDIL) 15 MG tablet, Take 1 tablet by mouth 3 times daily, Disp: 90 tablet, Rfl: 3    benazepril (LOTENSIN) 10 MG tablet, Take 1 tablet by mouth nightly, Disp: 30 tablet, Rfl: 3    vitamin B-12 (CYANOCOBALAMIN) 500 MCG tablet, Take 500 mcg by mouth daily, Disp: , Rfl:     promethazine (PHENERGAN) 25 MG tablet, Take 25 mg by mouth every 8 hours as needed , Disp: , Rfl:     torsemide (DEMADEX) 20 MG tablet, Take 20 mg by mouth as needed , Disp: , Rfl:     Polyethylene Glycol 3350 (MIRALAX PO), Take by mouth as needed , Disp: , Rfl:     gabapentin (NEURONTIN) 300 MG capsule, Take 300 mg by mouth nightly , Disp: , Rfl:     glimepiride (AMARYL) 1 MG tablet, Take 2 mg by mouth 2 times daily , Disp: , Rfl:     fluticasone-vilanterol (BREO ELLIPTA) 100-25 MCG/INH AEPB, Inhale into the lungs, Disp: , Rfl:     LORazepam (ATIVAN) 1 MG tablet, Take 1 mg by mouth every 6 hours as needed for Anxiety (takes up to 5 tablets daily). , Disp: , Rfl:   aspirin 81 MG EC tablet, Take 81 mg by mouth nightly , Disp: , Rfl:     Cholecalciferol (VITAMIN D-3 PO), Take 2,000 Units by mouth nightly , Disp: , Rfl:     BIOTIN PO, Take 1,000 mcg by mouth nightly , Disp: , Rfl:     Melatonin 10 MG TABS, Take by mouth nightly , Disp: , Rfl:     Levalbuterol Tartrate (XOPENEX HFA IN), Inhale 1 Inhaler into the lungs , Disp: , Rfl:     Allergies: Allergies   Allergen Reactions    Penicillins Anaphylaxis and Shortness Of Breath    Sulfa Antibiotics Itching and Swelling       Past Surgical History:   Procedure Laterality Date    APPENDECTOMY      CARDIAC PACEMAKER PLACEMENT  2016     SECTION      x3    HYSTERECTOMY      PACEMAKER PLACEMENT Left 2016    TONSILLECTOMY      TUBAL LIGATION       Past Medical History:   Diagnosis Date    [facility]     Depression     Diabetes ([facility]     History of left shoulder fracture     Hypertension     Neuropathy     PONV (postoperative nausea and vomiting)     Sinus arrest     [facility]     Syncope and collapse        Vitals:    20 1004   Temp: 97.5 °F (36.4 °C)   Weight: 221 lb (100.2 kg)   Height: 5' 8\" (1.727 m)       Exam:  Neurovascular status unchanged. At this time the nail/s 1, 2, 3, 4, 5 right foot and nail/s 1, 2, 3, 4, 5 left foot are noted to be thickened, dystrophic and discolored with subungual debris present. Tenderness noted to palpation. Minimal hair growth is noted to both lower extremities. Edema noted with both varicosities and stasis skin changes present bilaterally. Coolness is noted to the digital regions to palpation. Capillary fill time delayed digital areas bilateral foot. No heel fissuring or macerations of the web spaces. No plantar calluses and/or ulcerative areas are noted. Patient is having difficulty with gait/walking. Plan Per Assessment  James Zander was seen today for nail problem. Diagnoses and all orders for this visit:    Onychomycosis    Pain of toe of right foot    Pain of toe of left foot    PVD (peripheral vascular disease) ([facility]    Type II diabetes mellitus with peripheral circulatory disorder (HCC)    Difficulty walking        1. Evaluation and Management  2. Manual and electrical debridement of the mycotic nails was performed for thickness and length to prevent injection and/or ulceration. 3. I recommended antifungal cream to the nails daily. 4. It was discussed in detail with the patient proper caring for the vascular compromised foot. The fact that they have compromised blood flow put the patient at risk for infection/gangrene/amputation. The patient should not walk barefoot. Shoe gear should fit properly and socks should be worn with shoes. Exercise is very important to prevent worsening of the disease process but before performing an exercise program should check with their family physician first.  If any skin lesions are noted, they are instructed to contact the office immediately. 5. It was discussed in detail with the patient proper hygiene for the diabetic foot. They are to get in the habit of looking at their feet or have someone look at them. If they are unable to do daily, they are to look for any signs of redness, blistering, cracking, swelling, drainage, open lesions, etc.  They are to dry in between the toes after each bath or shower gently. The water should be tested with the elbow to prevent burns. The patient is to refrain from soaking their feet unless instructed by myself to do otherwise. They are to refrain from going barefoot. Shoe gear should be inspected for any foreign objects. Shoes should have a deep wide toe box. With any type of shoe, the feet should be inspected for any signs of pressure, i.e. redness, blistering, or open sores. Further instructional guidelines were dispensed to the patient. 6. We will see the patient back at a later date for continued podiatric management and care. Patient was advised to call the office with any questions or concerns prior to their next appointment if needed. Seen By:    Francisco J Francis DPM    Electronically signed by FranciscoJ Francis DPM on 12/18/2020 at 10:22 AM      This note was created using voice recognition software. The note was reviewed however may contain grammatical errors.

## 2021-02-08 ENCOUNTER — TELEPHONE (OUTPATIENT)
Dept: NON INVASIVE DIAGNOSTICS | Age: 71
End: 2021-02-08

## 2021-02-17 NOTE — TELEPHONE ENCOUNTER
Patient was due 02/03/2021 and has not yet sent a remote transmission to the office. Mailed the patient a letter to remote them to hook up their monitor and send us a remote transmission.

## 2021-02-26 ENCOUNTER — NURSE ONLY (OUTPATIENT)
Dept: NON INVASIVE DIAGNOSTICS | Age: 71
End: 2021-02-26
Payer: MEDICARE

## 2021-02-26 DIAGNOSIS — Z95.0 PACEMAKER: Primary | ICD-10-CM

## 2021-02-26 DIAGNOSIS — I49.5 SSS (SICK SINUS SYNDROME) (HCC): ICD-10-CM

## 2021-02-26 DIAGNOSIS — R55 SYNCOPE AND COLLAPSE: ICD-10-CM

## 2021-02-26 PROCEDURE — 93294 REM INTERROG EVL PM/LDLS PM: CPT | Performed by: INTERNAL MEDICINE

## 2021-02-26 PROCEDURE — 93296 REM INTERROG EVL PM/IDS: CPT | Performed by: INTERNAL MEDICINE

## 2021-03-01 ENCOUNTER — PROCEDURE VISIT (OUTPATIENT)
Dept: PODIATRY | Age: 71
End: 2021-03-01
Payer: MEDICARE

## 2021-03-01 VITALS — HEIGHT: 69 IN | WEIGHT: 210 LBS | BODY MASS INDEX: 31.1 KG/M2

## 2021-03-01 DIAGNOSIS — R26.2 DIFFICULTY WALKING: ICD-10-CM

## 2021-03-01 DIAGNOSIS — E11.42 DIABETIC POLYNEUROPATHY ASSOCIATED WITH TYPE 2 DIABETES MELLITUS (HCC): ICD-10-CM

## 2021-03-01 DIAGNOSIS — E11.51 TYPE II DIABETES MELLITUS WITH PERIPHERAL CIRCULATORY DISORDER (HCC): ICD-10-CM

## 2021-03-01 DIAGNOSIS — B35.1 ONYCHOMYCOSIS: Primary | ICD-10-CM

## 2021-03-01 DIAGNOSIS — I73.9 PVD (PERIPHERAL VASCULAR DISEASE) (HCC): ICD-10-CM

## 2021-03-01 PROCEDURE — 11721 DEBRIDE NAIL 6 OR MORE: CPT | Performed by: PODIATRIST

## 2021-03-01 NOTE — PROGRESS NOTES
Patient is here for routine nail check. Patient has concerns of right great toe nail.      Last ov with Yashira Hamlin MD was 12/2020    Electronically signed by Jasiel Anderson LPN on 2/7/8171 at 9:92 PM

## 2021-03-01 NOTE — PROGRESS NOTES
3/1/21     Katie Stanton    : 1950  Sex: female  Age: 79 y.o. Subjective: The patient is seen today for evaluation regarding diabetic foot evaluation and mycotic nail care. No other complaints noted. Chief Complaint   Patient presents with    Nail Problem       Current Medications:    Current Outpatient Medications:     VITAMIN K PO, Take 100 mg by mouth daily, Disp: , Rfl:     tiotropium (SPIRIVA) 18 MCG inhalation capsule, Inhale 18 mcg into the lungs daily, Disp: , Rfl:     albuterol sulfate HFA (VENTOLIN HFA) 108 (90 Base) MCG/ACT inhaler, Inhale 2 puffs into the lungs every 6 hours as needed for Wheezing, Disp: , Rfl:     metFORMIN (GLUCOPHAGE-XR) 750 MG extended release tablet, Take 750 mg by mouth Daily with supper, Disp: , Rfl:     omeprazole (PRILOSEC) 40 MG delayed release capsule, Take 40 mg by mouth daily, Disp: , Rfl:     OXYGEN, Inhale 2 L into the lungs nightly as needed, Disp: , Rfl:     phenelzine (NARDIL) 15 MG tablet, Take 1 tablet by mouth 3 times daily, Disp: 90 tablet, Rfl: 3    benazepril (LOTENSIN) 10 MG tablet, Take 1 tablet by mouth nightly, Disp: 30 tablet, Rfl: 3    vitamin B-12 (CYANOCOBALAMIN) 500 MCG tablet, Take 500 mcg by mouth daily, Disp: , Rfl:     promethazine (PHENERGAN) 25 MG tablet, Take 25 mg by mouth every 8 hours as needed , Disp: , Rfl:     torsemide (DEMADEX) 20 MG tablet, Take 20 mg by mouth as needed , Disp: , Rfl:     Polyethylene Glycol 3350 (MIRALAX PO), Take by mouth as needed , Disp: , Rfl:     gabapentin (NEURONTIN) 300 MG capsule, Take 300 mg by mouth nightly , Disp: , Rfl:     glimepiride (AMARYL) 1 MG tablet, Take 2 mg by mouth 2 times daily , Disp: , Rfl:     fluticasone-vilanterol (BREO ELLIPTA) 100-25 MCG/INH AEPB, Inhale into the lungs, Disp: , Rfl:     LORazepam (ATIVAN) 1 MG tablet, Take 1 mg by mouth every 6 hours as needed for Anxiety (takes up to 5 tablets daily). , Disp: , Rfl:   aspirin 81 MG EC tablet, Take 81 mg by mouth nightly , Disp: , Rfl:     Cholecalciferol (VITAMIN D-3 PO), Take 2,000 Units by mouth nightly , Disp: , Rfl:     BIOTIN PO, Take 1,000 mcg by mouth nightly , Disp: , Rfl:     Melatonin 10 MG TABS, Take by mouth nightly , Disp: , Rfl:     Levalbuterol Tartrate (XOPENEX HFA IN), Inhale 1 Inhaler into the lungs , Disp: , Rfl:     Allergies: Allergies   Allergen Reactions    Penicillins Anaphylaxis and Shortness Of Breath    Sulfa Antibiotics Itching and Swelling       Past Surgical History:   Procedure Laterality Date    APPENDECTOMY      CARDIAC PACEMAKER PLACEMENT  2016     SECTION      x3    HYSTERECTOMY      PACEMAKER PLACEMENT Left 2016    TONSILLECTOMY      TUBAL LIGATION       Past Medical History:   Diagnosis Date    [facility] [address] [facility]     Depression     Diabetes ([facility] [facility]     History of left shoulder fracture     Hypertension     Neuropathy     PONV (postoperative nausea and vomiting)     Sinus arrest     [facility]ick [facility]     Syncope and collapse        Vitals:    21 1333   Weight: 210 lb (95.3 kg)   Height: 5' 8.5\" (1.74 m)       Exam:  Neurovascular status unchanged. At this time the nail/s 1, 2, 3, 4, 5 right foot and nail/s 1, 2, 3, 4, 5 left foot are noted to be thickened, dystrophic and discolored with subungual debris present. Paresthesias noted to both lower extremities. Minimal hair growth is noted to both lower extremities. Edema noted with both varicosities and stasis skin changes present bilaterally. Coolness is noted to the digital regions to palpation. Capillary fill time delayed digital areas bilateral foot. No heel fissuring or macerations of the web spaces. No plantar calluses and/or ulcerative areas are noted. Patient is having difficulty with gait/walking. Plan Per Assessment  Tadiván Blair was seen today for nail problem. Diagnoses and all orders for this visit:    Onychomycosis    PVD (peripheral vascular disease) ([facility]    Type II diabetes mellitus with [facility] circulatory disorder [facility]    Diabetic polyneuropathy associated with type 2 diabetes mellitus ([facility]    Difficulty walking        1. Evaluation and Management  2. Manual and electrical debridement of the mycotic nails was performed for thickness and length to prevent injection and/or ulceration. 3. I recommended antifungal cream to the nails daily. 4. It was discussed in detail with the patient proper caring for the vascular compromised foot. The fact that they have compromised blood flow put the patient at risk for infection/gangrene/amputation. The patient should not walk barefoot. Shoe gear should fit properly and socks should be worn with shoes. Exercise is very important to prevent worsening of the disease process but before performing an exercise program should check with their family physician first.  If any skin lesions are noted, they are instructed to contact the office immediately. 5. We will see the patient back at a later date for continued podiatric management and care. Patient was advised to call the office with any questions or concerns prior to their next appointment if needed. Seen By:    Ambrose Anderson DPM    Electronically signed by Ambrose Anderson DPM on 3/1/2021 at 1:53 PM      This note was created using voice recognition software. The note was reviewed however may contain grammatical errors.

## 2021-03-23 ENCOUNTER — TELEPHONE (OUTPATIENT)
Dept: NON INVASIVE DIAGNOSTICS | Age: 71
End: 2021-03-23

## 2021-03-23 NOTE — TELEPHONE ENCOUNTER
Call back from patient. Reviewed Dr. Leah Oates recommendations. Patient agrees to start on metoprolol 25mg QD. She would like this sent to [facility] in [place]. Patient also agrees to have ERICA done and would like to get blood work done at the same time. Forwarding to scheduling.      Suze Rodriguez

## 2021-03-23 NOTE — TELEPHONE ENCOUNTER
Attempted to call patient. No answer no machine. On home phone     Left message on cell phone.        Suze Rodriguez

## 2021-03-23 NOTE — PROGRESS NOTES
See [employer] report. Remote monitoring reviewed over a 90 day period. End of 90 day monitoring period date of service 2.26.2021.

## 2021-03-24 DIAGNOSIS — I73.9 PVD (PERIPHERAL VASCULAR DISEASE) (HCC): ICD-10-CM

## 2021-03-24 DIAGNOSIS — R55 NEAR SYNCOPE: ICD-10-CM

## 2021-03-24 DIAGNOSIS — I49.5 SSS (SICK SINUS SYNDROME) (HCC): Primary | ICD-10-CM

## 2021-03-24 RX ORDER — METOPROLOL SUCCINATE 25 MG/1
25 TABLET, EXTENDED RELEASE ORAL DAILY
Qty: 90 TABLET | Refills: 3 | Status: SHIPPED
Start: 2021-03-24 | End: 2022-01-11 | Stop reason: ALTCHOICE

## 2021-03-24 RX ORDER — METOPROLOL SUCCINATE 25 MG/1
25 TABLET, EXTENDED RELEASE ORAL DAILY
COMMUNITY
End: 2021-03-24 | Stop reason: SDUPTHER

## 2021-03-25 NOTE — TELEPHONE ENCOUNTER
Patient called back and she wishes not to start metoprolol. She has COPD and depression. She states she read the side effects and does not feel comfortable taking this medication.  Please advise

## 2021-03-29 NOTE — TELEPHONE ENCOUNTER
Message left from patient stating that she had some blood work done at Dr. Queen Guevara office about 2-3 weeks ago. Will try to obtain. Forwarded to scheduling to obtain blood work.        Suze Rodriguez

## 2021-05-13 ENCOUNTER — TELEPHONE (OUTPATIENT)
Dept: CARDIOLOGY | Age: 71
End: 2021-05-13

## 2021-05-13 NOTE — TELEPHONE ENCOUNTER
Scheduled echocardiogram..    Reviewed Covid-19 checklist with the patient.     Electronically signed by Bety Martin on 5/13/2021 at 9:39 AM

## 2021-05-17 ENCOUNTER — PROCEDURE VISIT (OUTPATIENT)
Dept: PODIATRY | Age: 71
End: 2021-05-17
Payer: MEDICARE

## 2021-05-17 VITALS — BODY MASS INDEX: 31.1 KG/M2 | HEIGHT: 69 IN | WEIGHT: 210 LBS | RESPIRATION RATE: 18 BRPM

## 2021-05-17 DIAGNOSIS — I73.9 PVD (PERIPHERAL VASCULAR DISEASE) (HCC): ICD-10-CM

## 2021-05-17 DIAGNOSIS — E11.42 DIABETIC POLYNEUROPATHY ASSOCIATED WITH TYPE 2 DIABETES MELLITUS (HCC): ICD-10-CM

## 2021-05-17 DIAGNOSIS — E11.51 TYPE II DIABETES MELLITUS WITH PERIPHERAL CIRCULATORY DISORDER (HCC): ICD-10-CM

## 2021-05-17 DIAGNOSIS — R26.2 DIFFICULTY WALKING: ICD-10-CM

## 2021-05-17 DIAGNOSIS — B35.1 ONYCHOMYCOSIS: Primary | ICD-10-CM

## 2021-05-17 PROCEDURE — 11721 DEBRIDE NAIL 6 OR MORE: CPT | Performed by: PODIATRIST

## 2021-05-18 NOTE — PROGRESS NOTES
21     Nathaly Barrios    : 1950  Sex: female  Age: 79 y.o. Subjective: The patient is seen today for evaluation regarding diabetic foot evaluation and mycotic nail care. No other complaints noted.     Chief Complaint   Patient presents with    Nail Problem     routine nail care no concerns at this time       Current Medications:    Current Outpatient Medications:     metoprolol succinate (TOPROL XL) 25 MG extended release tablet, Take 1 tablet by mouth daily, Disp: 90 tablet, Rfl: 3    VITAMIN K PO, Take 100 mg by mouth daily, Disp: , Rfl:     tiotropium (SPIRIVA) 18 MCG inhalation capsule, Inhale 18 mcg into the lungs daily, Disp: , Rfl:     albuterol sulfate HFA (VENTOLIN HFA) 108 (90 Base) MCG/ACT inhaler, Inhale 2 puffs into the lungs every 6 hours as needed for Wheezing, Disp: , Rfl:     metFORMIN (GLUCOPHAGE-XR) 750 MG extended release tablet, Take 750 mg by mouth Daily with supper, Disp: , Rfl:     omeprazole (PRILOSEC) 40 MG delayed release capsule, Take 40 mg by mouth daily, Disp: , Rfl:     OXYGEN, Inhale 2 L into the lungs nightly as needed, Disp: , Rfl:     phenelzine (NARDIL) 15 MG tablet, Take 1 tablet by mouth 3 times daily, Disp: 90 tablet, Rfl: 3    benazepril (LOTENSIN) 10 MG tablet, Take 1 tablet by mouth nightly, Disp: 30 tablet, Rfl: 3    vitamin B-12 (CYANOCOBALAMIN) 500 MCG tablet, Take 500 mcg by mouth daily, Disp: , Rfl:     promethazine (PHENERGAN) 25 MG tablet, Take 25 mg by mouth every 8 hours as needed , Disp: , Rfl:     torsemide (DEMADEX) 20 MG tablet, Take 20 mg by mouth as needed , Disp: , Rfl:     Polyethylene Glycol 3350 (MIRALAX PO), Take by mouth as needed , Disp: , Rfl:     gabapentin (NEURONTIN) 300 MG capsule, Take 300 mg by mouth nightly , Disp: , Rfl:     glimepiride (AMARYL) 1 MG tablet, Take 2 mg by mouth 2 times daily , Disp: , Rfl:     fluticasone-vilanterol (BREO ELLIPTA) 100-25 MCG/INH AEPB, Inhale into the lungs, Disp: , Rfl:    LORazepam (ATIVAN) 1 MG tablet, Take 1 mg by mouth every 6 hours as needed for Anxiety (takes up to 5 tablets daily). , Disp: , Rfl:     aspirin 81 MG EC tablet, Take 81 mg by mouth nightly , Disp: , Rfl:     Cholecalciferol (VITAMIN D-3 PO), Take 2,000 Units by mouth nightly , Disp: , Rfl:     BIOTIN PO, Take 1,000 mcg by mouth nightly , Disp: , Rfl:     Melatonin 10 MG TABS, Take by mouth nightly , Disp: , Rfl:     Levalbuterol Tartrate (XOPENEX HFA IN), Inhale 1 Inhaler into the lungs , Disp: , Rfl:     Allergies: Allergies   Allergen Reactions    Penicillins Anaphylaxis and Shortness Of Breath    Sulfa Antibiotics Itching and Swelling       Past Surgical History:   Procedure Laterality Date    APPENDECTOMY      CARDIAC PACEMAKER PLACEMENT  2016     SECTION      x3    HYSTERECTOMY      PACEMAKER PLACEMENT Left 2016    TONSILLECTOMY      TUBAL LIGATION       Past Medical History:   Diagnosis Date    [facility]     Depression     Diabetes ([facility]     History of left shoulder fracture     Hypertension     Neuropathy     PONV (postoperative nausea and vomiting)     Sinus arrest     [facility]     Syncope and collapse        Vitals:    21 1349   Resp: 18   Weight: 210 lb (95.3 kg)   Height: 5' 8.5\" (1.74 m)       Exam:  Neurovascular status unchanged. At this time the nail/s 1, 2, 3, 4, 5 right foot and nail/s 1, 2, 3, 4, 5 left foot are noted to be thickened, dystrophic and discolored with subungual debris present. Paresthesias noted to both lower extremities. Diminished hair growth is noted to both lower extremities. Edema noted with both varicosities and stasis skin changes present bilaterally. Coolness is noted to the digital regions to palpation. Capillary fill time delayed digital areas bilateral foot. No heel fissuring or macerations of the web spaces.   No plantar calluses and/or ulcerative areas are noted. Patient is having difficulty with gait/walking. Plan Per Assessment  Miguel Angel Ahumada was seen today for nail problem. Diagnoses and all orders for this visit:    Onychomycosis    PVD (peripheral vascular disease) ([facility] [facility]    Type II diabetes mellitus with peripheral circulatory disorder ([facility]    Diabetic polyneuropathy associated with type 2 diabetes mellitus ([facility] [facility]    Difficulty walking        1. Evaluation and Management  2. Manual and electrical debridement of the mycotic nails was performed for thickness and length to prevent injection and/or ulceration. 3. I recommended antifungal cream to the nails daily. 4. It was discussed in detail with the patient proper caring for the vascular compromised foot. The fact that they have compromised blood flow put the patient at risk for infection/gangrene/amputation. The patient should not walk barefoot. Shoe gear should fit properly and socks should be worn with shoes. Exercise is very important to prevent worsening of the disease process but before performing an exercise program should check with their family physician first.  If any skin lesions are noted, they are instructed to contact the office immediately. 5. It was discussed in detail with the patient proper hygiene for the diabetic foot. They are to get in the habit of looking at their feet or have someone look at them. If they are unable to do daily, they are to look for any signs of redness, blistering, cracking, swelling, drainage, open lesions, etc.  They are to dry in between the toes after each bath or shower gently. The water should be tested with the elbow to prevent burns. The patient is to refrain from soaking their feet unless instructed by myself to do otherwise. They are to refrain from going barefoot. Shoe gear should be inspected for any foreign objects. Shoes should have a deep wide toe box.   With any type of shoe, the feet should be inspected for any signs of pressure, i.e. redness, blistering, or open sores. Further instructional guidelines were dispensed to the patient. 6. We will see the patient back at a later date for continued podiatric management and care. Patient was advised to call the office with any questions or concerns prior to their next appointment if needed. Seen By:    Nyasia Kline DPM    Electronically signed by Nyasia Kline DPM on 5/17/2021 at 8:45 PM      This note was created using voice recognition software. The note was reviewed however may contain grammatical errors.

## 2021-06-03 ENCOUNTER — HOSPITAL ENCOUNTER (OUTPATIENT)
Dept: CARDIOLOGY | Age: 71
Discharge: HOME | End: 2021-06-03
Payer: MEDICARE

## 2021-06-03 DIAGNOSIS — R55 NEAR SYNCOPE: ICD-10-CM

## 2021-06-03 LAB
LV EF: 63 %
LVEF MODALITY: NORMAL

## 2021-06-03 PROCEDURE — 93306 TTE W/DOPPLER COMPLETE: CPT | Performed by: PSYCHIATRY & NEUROLOGY

## 2021-06-07 ENCOUNTER — TELEPHONE (OUTPATIENT)
Dept: NON INVASIVE DIAGNOSTICS | Age: 71
End: 2021-06-07

## 2021-06-07 NOTE — TELEPHONE ENCOUNTER
----- Message from Elizabeth Paez MD sent at 6/7/2021  7:48 AM EDT -----  Pls let her know that her Echo shows normal heart function

## 2021-07-30 ENCOUNTER — OFFICE VISIT (OUTPATIENT)
Dept: PODIATRY | Age: 71
End: 2021-07-30
Payer: MEDICARE

## 2021-07-30 VITALS — BODY MASS INDEX: 31.1 KG/M2 | HEIGHT: 69 IN | WEIGHT: 210 LBS

## 2021-07-30 DIAGNOSIS — E11.42 DIABETIC POLYNEUROPATHY ASSOCIATED WITH TYPE 2 DIABETES MELLITUS (HCC): ICD-10-CM

## 2021-07-30 DIAGNOSIS — I73.9 PVD (PERIPHERAL VASCULAR DISEASE) (HCC): ICD-10-CM

## 2021-07-30 DIAGNOSIS — B35.1 ONYCHOMYCOSIS: Primary | ICD-10-CM

## 2021-07-30 DIAGNOSIS — E11.51 TYPE II DIABETES MELLITUS WITH PERIPHERAL CIRCULATORY DISORDER (HCC): ICD-10-CM

## 2021-07-30 DIAGNOSIS — R26.2 DIFFICULTY WALKING: ICD-10-CM

## 2021-07-30 PROCEDURE — 11721 DEBRIDE NAIL 6 OR MORE: CPT | Performed by: PODIATRIST

## 2021-07-30 NOTE — PROGRESS NOTES
21     [facility]    : 1950  Sex: female  Age: 79 y.o. Subjective: The patient is seen today for evaluation regarding diabetic foot evaluation and mycotic nail care. No other complaints noted.     Chief Complaint   Patient presents with    Nail Problem       Current Medications:    Current Outpatient Medications:     metoprolol succinate (TOPROL XL) 25 MG extended release tablet, Take 1 tablet by mouth daily, Disp: 90 tablet, Rfl: 3    VITAMIN K PO, Take 100 mg by mouth daily, Disp: , Rfl:     tiotropium (SPIRIVA) 18 MCG inhalation capsule, Inhale 18 mcg into the lungs daily, Disp: , Rfl:     albuterol sulfate HFA (VENTOLIN HFA) 108 (90 Base) MCG/ACT inhaler, Inhale 2 puffs into the lungs every 6 hours as needed for Wheezing, Disp: , Rfl:     metFORMIN (GLUCOPHAGE-XR) 750 MG extended release tablet, Take 750 mg by mouth Daily with supper, Disp: , Rfl:     omeprazole (PRILOSEC) 40 MG delayed release capsule, Take 40 mg by mouth daily, Disp: , Rfl:     OXYGEN, Inhale 2 L into the lungs nightly as needed, Disp: , Rfl:     phenelzine (NARDIL) 15 MG tablet, Take 1 tablet by mouth 3 times daily, Disp: 90 tablet, Rfl: 3    benazepril (LOTENSIN) 10 MG tablet, Take 1 tablet by mouth nightly, Disp: 30 tablet, Rfl: 3    vitamin B-12 (CYANOCOBALAMIN) 500 MCG tablet, Take 500 mcg by mouth daily, Disp: , Rfl:     promethazine (PHENERGAN) 25 MG tablet, Take 25 mg by mouth every 8 hours as needed , Disp: , Rfl:     torsemide (DEMADEX) 20 MG tablet, Take 20 mg by mouth as needed , Disp: , Rfl:     Polyethylene Glycol 3350 (MIRALAX PO), Take by mouth as needed , Disp: , Rfl:     gabapentin (NEURONTIN) 300 MG capsule, Take 300 mg by mouth nightly , Disp: , Rfl:     glimepiride (AMARYL) 1 MG tablet, Take 2 mg by mouth 2 times daily , Disp: , Rfl:     fluticasone-vilanterol (BREO ELLIPTA) 100-25 MCG/INH AEPB, Inhale into the lungs, Disp: , Rfl:     LORazepam (ATIVAN) 1 MG tablet, Take 1 mg by mouth every 6 hours as needed for Anxiety (takes up to 5 tablets daily). , Disp: , Rfl:     aspirin 81 MG EC tablet, Take 81 mg by mouth nightly , Disp: , Rfl:     Cholecalciferol (VITAMIN D-3 PO), Take 2,000 Units by mouth nightly , Disp: , Rfl:     BIOTIN PO, Take 1,000 mcg by mouth nightly , Disp: , Rfl:     Melatonin 10 MG TABS, Take by mouth nightly , Disp: , Rfl:     Levalbuterol Tartrate (XOPENEX HFA IN), Inhale 1 Inhaler into the lungs , Disp: , Rfl:     Allergies: Allergies   Allergen Reactions    Penicillins Anaphylaxis and Shortness Of Breath    Sulfa Antibiotics Itching and Swelling       Past Surgical History:   Procedure Laterality Date    APPENDECTOMY      CARDIAC PACEMAKER PLACEMENT  2016     SECTION      x3    HYSTERECTOMY      PACEMAKER PLACEMENT Left 2016    TONSILLECTOMY      TUBAL LIGATION       Past Medical History:   Diagnosis Date    [facility] [address]     Depression     Diabetes [facility] [address]     History of left shoulder fracture     Hypertension     Neuropathy     PONV (postoperative nausea and vomiting)     Sinus arrest     [facility]     Syncope and collapse        Vitals:    21 1024   Weight: 210 lb (95.3 kg)   Height: 5' 9\" (1.753 m)       Exam:  Neurovascular status unchanged. At this time the nail/s 1, 2, 3, 4, 5 right foot and nail/s 1, 2, 3, 4, 5 left foot are noted to be thickened, dystrophic and discolored with subungual debris present. Paresthesias noted to palpation bilateral lower extremities. Absence of hair growth is noted to both lower extremities. Edema noted with varicosities and stasis skin changes present bilaterally. Coolness is noted to the digital regions to palpation. Capillary fill time delayed digital areas bilateral foot. No heel fissuring or macerations of the web spaces. No plantar calluses and/or ulcerative areas are noted.   Patient is having difficulty with gait/walking. Plan Per Assessment  Maura Aguilar was seen today for nail problem. Diagnoses and all orders for this visit:    Onychomycosis    PVD (peripheral vascular disease) ([facility] [address]    Type II diabetes mellitus with peripheral circulatory disorder ([facility] [address]    Diabetic polyneuropathy associated with type 2 diabetes mellitus ([facility] [address]    Difficulty walking        1. Evaluation and Management  2. Manual and electrical debridement of the mycotic nails was performed for thickness and length to prevent injection and/or ulceration. 3. I recommended antifungal cream to the nails daily. 4. It was discussed in detail with the patient proper caring for the vascular compromised foot. The fact that they have compromised blood flow put the patient at risk for infection/gangrene/amputation. The patient should not walk barefoot. Shoe gear should fit properly and socks should be worn with shoes. Exercise is very important to prevent worsening of the disease process but before performing an exercise program should check with their family physician first.  If any skin lesions are noted, they are instructed to contact the office immediately. 5. It was discussed in detail with the patient proper hygiene for the diabetic foot. They are to get in the habit of looking at their feet or have someone look at them. If they are unable to do daily, they are to look for any signs of redness, blistering, cracking, swelling, drainage, open lesions, etc.  They are to dry in between the toes after each bath or shower gently. The water should be tested with the elbow to prevent burns. The patient is to refrain from soaking their feet unless instructed by myself to do otherwise. They are to refrain from going barefoot. Shoe gear should be inspected for any foreign objects. Shoes should have a deep wide toe box. With any type of shoe, the feet should be inspected for any signs of pressure, i.e. redness, blistering, or open sores. Further instructional guidelines were dispensed to the patient. 6. We will see the patient back at a later date for continued podiatric management and care. Patient was advised to call the office with any questions or concerns prior to their next appointment if needed. Seen By:    Sweetie Arias DPM    Electronically signed by Sweetie Arias DPM on 7/30/2021 at 6:41 PM      This note was created using voice recognition software. The note was reviewed however may contain grammatical errors.

## 2021-07-30 NOTE — PROGRESS NOTES
Patient in today for nail care. Patient does not have any complaints of pain at this time.  Patient's PCP is Shala Acevedo MD date of last ov 6/15/2021        Ken Evans LPN

## 2021-09-08 ENCOUNTER — TELEPHONE (OUTPATIENT)
Dept: NON INVASIVE DIAGNOSTICS | Age: 71
End: 2021-09-08

## 2021-09-08 NOTE — TELEPHONE ENCOUNTER
Saloni Su called and states she had a CXR done and was instructed to call \"cardiology\" to make a f/u. Upon review of the CXR \"Possible Pulmonary HTN\" I left Saloni Su a VM to have her PCP or lung doctor refer her to a cardiologist. She is not due to see EP for ppm management until 11/2021.

## 2021-10-01 ENCOUNTER — OFFICE VISIT (OUTPATIENT)
Dept: PODIATRY | Age: 71
End: 2021-10-01
Payer: MEDICARE

## 2021-10-01 VITALS — WEIGHT: 210 LBS | BODY MASS INDEX: 31.1 KG/M2 | HEIGHT: 69 IN

## 2021-10-01 DIAGNOSIS — I73.9 PVD (PERIPHERAL VASCULAR DISEASE) (HCC): ICD-10-CM

## 2021-10-01 DIAGNOSIS — B35.1 ONYCHOMYCOSIS: Primary | ICD-10-CM

## 2021-10-01 DIAGNOSIS — E11.51 TYPE II DIABETES MELLITUS WITH PERIPHERAL CIRCULATORY DISORDER (HCC): ICD-10-CM

## 2021-10-01 DIAGNOSIS — M79.675 PAIN OF TOE OF LEFT FOOT: ICD-10-CM

## 2021-10-01 DIAGNOSIS — R26.2 DIFFICULTY WALKING: ICD-10-CM

## 2021-10-01 DIAGNOSIS — M79.674 PAIN OF TOE OF RIGHT FOOT: ICD-10-CM

## 2021-10-01 PROCEDURE — 11721 DEBRIDE NAIL 6 OR MORE: CPT | Performed by: PODIATRIST

## 2021-10-01 NOTE — PROGRESS NOTES
Patient in today for nail care. Patient does not have any complaints of pain at this time.  Patient's PCP is Ghazal Rodriguez MD date of last ov 6/15/2021        Anthony Adames LPN

## 2021-10-01 NOTE — PROGRESS NOTES
10/1/21     Dayanara Sharp    : 1950  Sex: female  Age: 70 y.o. Subjective: The patient is seen today for evaluation regarding diabetic foot evaluation and mycotic nail care. No other complaints noted.     Chief Complaint   Patient presents with    Nail Problem       Current Medications:    Current Outpatient Medications:     metoprolol succinate (TOPROL XL) 25 MG extended release tablet, Take 1 tablet by mouth daily, Disp: 90 tablet, Rfl: 3    VITAMIN K PO, Take 100 mg by mouth daily, Disp: , Rfl:     tiotropium (SPIRIVA) 18 MCG inhalation capsule, Inhale 18 mcg into the lungs daily, Disp: , Rfl:     albuterol sulfate HFA (VENTOLIN HFA) 108 (90 Base) MCG/ACT inhaler, Inhale 2 puffs into the lungs every 6 hours as needed for Wheezing, Disp: , Rfl:     metFORMIN (GLUCOPHAGE-XR) 750 MG extended release tablet, Take 750 mg by mouth Daily with supper, Disp: , Rfl:     omeprazole (PRILOSEC) 40 MG delayed release capsule, Take 40 mg by mouth daily, Disp: , Rfl:     OXYGEN, Inhale 2 L into the lungs nightly as needed, Disp: , Rfl:     phenelzine (NARDIL) 15 MG tablet, Take 1 tablet by mouth 3 times daily, Disp: 90 tablet, Rfl: 3    benazepril (LOTENSIN) 10 MG tablet, Take 1 tablet by mouth nightly, Disp: 30 tablet, Rfl: 3    vitamin B-12 (CYANOCOBALAMIN) 500 MCG tablet, Take 500 mcg by mouth daily, Disp: , Rfl:     promethazine (PHENERGAN) 25 MG tablet, Take 25 mg by mouth every 8 hours as needed , Disp: , Rfl:     torsemide (DEMADEX) 20 MG tablet, Take 20 mg by mouth as needed , Disp: , Rfl:     Polyethylene Glycol 3350 (MIRALAX PO), Take by mouth as needed , Disp: , Rfl:     gabapentin (NEURONTIN) 300 MG capsule, Take 300 mg by mouth nightly , Disp: , Rfl:     glimepiride (AMARYL) 1 MG tablet, Take 2 mg by mouth 2 times daily , Disp: , Rfl:     fluticasone-vilanterol (BREO ELLIPTA) 100-25 MCG/INH AEPB, Inhale into the lungs, Disp: , Rfl:     LORazepam (ATIVAN) 1 MG tablet, Take 1 mg by mouth every 6 hours as needed for Anxiety (takes up to 5 tablets daily). , Disp: , Rfl:     aspirin 81 MG EC tablet, Take 81 mg by mouth nightly , Disp: , Rfl:     Cholecalciferol (VITAMIN D-3 PO), Take 2,000 Units by mouth nightly , Disp: , Rfl:     BIOTIN PO, Take 1,000 mcg by mouth nightly , Disp: , Rfl:     Melatonin 10 MG TABS, Take by mouth nightly , Disp: , Rfl:     Levalbuterol Tartrate (XOPENEX HFA IN), Inhale 1 Inhaler into the lungs , Disp: , Rfl:     Allergies: Allergies   Allergen Reactions    Penicillins Anaphylaxis and Shortness Of Breath    Sulfa Antibiotics Itching and Swelling       Past Surgical History:   Procedure Laterality Date    APPENDECTOMY      CARDIAC PACEMAKER PLACEMENT  2016     SECTION      x3    HYSTERECTOMY      PACEMAKER PLACEMENT Left 2016    TONSILLECTOMY      TUBAL LIGATION       Past Medical History:   Diagnosis Date    [facility]) ([facility] [address]     Depression     Diabetes ([facility] [address]     History of left shoulder fracture     Hypertension     Neuropathy     PONV (postoperative nausea and vomiting)     Sinus arrest     SSS [facility]sick sinus syndrome) ([facility])     Syncope and collapse        Vitals:    10/01/21 1017   Weight: 210 lb (95.3 kg)   Height: 5' 9\" (1.753 m)       Exam:  Pedal pulses diminished to palpation bilateral foot. Capillary refill time delayed digital regions bilateral foot. At this time the nail/s 1, 2, 5 right foot and nail/s 1, 2, 5 left foot are noted to be thickened, dystrophic and discolored with subungual debris present. Tenderness noted to palpation. Minimal hair growth is noted to both lower extremities. Edema noted with both varicosities and stasis skin changes present bilaterally. Coolness is noted to the digital regions to palpation. Capillary fill time delayed digital areas bilateral foot. No heel fissuring or macerations of the web spaces.   No plantar calluses and/or ulcerative areas are noted. Patient is having difficulty with gait/walking. Plan Per Assessment  Naldo Montiel was seen today for nail problem. Diagnoses and all orders for this visit:    Onychomycosis    Pain of toe of right foot    Pain of toe of left foot    PVD (peripheral vascular disease) ([address]    Type II diabetes mellitus with peripheral circulatory disorder (HCC)    Difficulty walking        1. Evaluation and Management  2. Manual and electrical debridement of the mycotic nails was performed for thickness and length to prevent injection and/or ulceration. 3. I recommended antifungal cream to the nails daily. 4. It was discussed in detail with the patient proper caring for the vascular compromised foot. The fact that they have compromised blood flow put the patient at risk for infection/gangrene/amputation. The patient should not walk barefoot. Shoe gear should fit properly and socks should be worn with shoes. Exercise is very important to prevent worsening of the disease process but before performing an exercise program should check with their family physician first.  If any skin lesions are noted, they are instructed to contact the office immediately. 5. We will see the patient back at a later date for continued podiatric management and care. Patient was advised to call the office with any questions or concerns prior to their next appointment if needed. Seen By:    Salomon Mckeon DPM    Electronically signed by Salomon Mckeon DPM on 10/1/2021 at 10:41 AM      This note was created using voice recognition software. The note was reviewed however may contain grammatical errors.

## 2021-10-27 ENCOUNTER — OFFICE VISIT (OUTPATIENT)
Dept: PODIATRY | Age: 71
End: 2021-10-27
Payer: MEDICARE

## 2021-10-27 VITALS — WEIGHT: 215 LBS | HEIGHT: 69 IN | BODY MASS INDEX: 31.84 KG/M2

## 2021-10-27 DIAGNOSIS — S90.111A CONTUSION OF RIGHT GREAT TOE WITHOUT DAMAGE TO NAIL, INITIAL ENCOUNTER: Primary | ICD-10-CM

## 2021-10-27 DIAGNOSIS — E11.51 TYPE II DIABETES MELLITUS WITH PERIPHERAL CIRCULATORY DISORDER (HCC): ICD-10-CM

## 2021-10-27 DIAGNOSIS — M79.674 PAIN OF TOE OF RIGHT FOOT: ICD-10-CM

## 2021-10-27 PROCEDURE — 2022F DILAT RTA XM EVC RTNOPTHY: CPT | Performed by: PODIATRIST

## 2021-10-27 PROCEDURE — G8427 DOCREV CUR MEDS BY ELIG CLIN: HCPCS | Performed by: PODIATRIST

## 2021-10-27 PROCEDURE — 3046F HEMOGLOBIN A1C LEVEL >9.0%: CPT | Performed by: PODIATRIST

## 2021-10-27 PROCEDURE — G8400 PT W/DXA NO RESULTS DOC: HCPCS | Performed by: PODIATRIST

## 2021-10-27 PROCEDURE — 1123F ACP DISCUSS/DSCN MKR DOCD: CPT | Performed by: PODIATRIST

## 2021-10-27 PROCEDURE — 3017F COLORECTAL CA SCREEN DOC REV: CPT | Performed by: PODIATRIST

## 2021-10-27 PROCEDURE — 1090F PRES/ABSN URINE INCON ASSESS: CPT | Performed by: PODIATRIST

## 2021-10-27 PROCEDURE — G8417 CALC BMI ABV UP PARAM F/U: HCPCS | Performed by: PODIATRIST

## 2021-10-27 PROCEDURE — G8484 FLU IMMUNIZE NO ADMIN: HCPCS | Performed by: PODIATRIST

## 2021-10-27 PROCEDURE — 1036F TOBACCO NON-USER: CPT | Performed by: PODIATRIST

## 2021-10-27 PROCEDURE — 99213 OFFICE O/P EST LOW 20 MIN: CPT | Performed by: PODIATRIST

## 2021-10-27 PROCEDURE — 4040F PNEUMOC VAC/ADMIN/RCVD: CPT | Performed by: PODIATRIST

## 2021-10-27 RX ORDER — AMMONIUM LACTATE 12 G/100G
LOTION TOPICAL
Qty: 222 ML | Refills: 5 | Status: SHIPPED | OUTPATIENT
Start: 2021-10-27

## 2021-10-27 RX ORDER — DOXYCYCLINE HYCLATE 100 MG
100 TABLET ORAL 2 TIMES DAILY
Qty: 28 TABLET | Refills: 0 | Status: SHIPPED | OUTPATIENT
Start: 2021-10-27 | End: 2021-11-10

## 2021-10-27 NOTE — PROGRESS NOTES
Patient is in today for evaluation of right great toe pain. Patient says the toe is red, has discomfort, but does not notice drainage.  pcp is Abdi Hartmann MD  Last ov 10/13/21

## 2021-10-27 NOTE — PROGRESS NOTES
10/27/21     Elfego Ora    : 1950   Sex: female    Age: 70 y.o. Patient's PCP/Provider is:  Hood Padgett MD    Subjective:  Patient is seen today for evaluation regarding evaluation discoloration and erythema to the distal right great toe. Patient stated she noticed the issue over the last several days. She does not relate any major injury to the right foot and/or digital regions. Patient does have neuropathy issues which she does not feel a lot of issues into the toe regions. Patient is in no acute distress. She denies any nausea, vomiting, fever, chills. Chief Complaint   Patient presents with    Toe Pain     right great toe        ROS:  Const: Positives and pertinent negatives as per HPI. Musculo: Denies symptoms other than stated above. Neuro: Denies symptoms other than stated above. Skin: Denies symptoms other than stated above. Current Medications:    Current Outpatient Medications:     doxycycline hyclate (VIBRA-TABS) 100 MG tablet, Take 1 tablet by mouth 2 times daily for 14 days, Disp: 28 tablet, Rfl: 0    ammonium lactate (LAC-HYDRIN) 12 % lotion, Apply topically daily. , Disp: 222 mL, Rfl: 5    metoprolol succinate (TOPROL XL) 25 MG extended release tablet, Take 1 tablet by mouth daily, Disp: 90 tablet, Rfl: 3    VITAMIN K PO, Take 100 mg by mouth daily, Disp: , Rfl:     tiotropium (SPIRIVA) 18 MCG inhalation capsule, Inhale 18 mcg into the lungs daily, Disp: , Rfl:     albuterol sulfate HFA (VENTOLIN HFA) 108 (90 Base) MCG/ACT inhaler, Inhale 2 puffs into the lungs every 6 hours as needed for Wheezing, Disp: , Rfl:     metFORMIN (GLUCOPHAGE-XR) 750 MG extended release tablet, Take 750 mg by mouth Daily with supper, Disp: , Rfl:     omeprazole (PRILOSEC) 40 MG delayed release capsule, Take 40 mg by mouth daily, Disp: , Rfl:     OXYGEN, Inhale 2 L into the lungs nightly as needed, Disp: , Rfl:     phenelzine (NARDIL) 15 MG tablet, Take 1 tablet by mouth 3 times daily, Disp: 90 tablet, Rfl: 3    benazepril (LOTENSIN) 10 MG tablet, Take 1 tablet by mouth nightly, Disp: 30 tablet, Rfl: 3    vitamin B-12 (CYANOCOBALAMIN) 500 MCG tablet, Take 500 mcg by mouth daily, Disp: , Rfl:     promethazine (PHENERGAN) 25 MG tablet, Take 25 mg by mouth every 8 hours as needed , Disp: , Rfl:     torsemide (DEMADEX) 20 MG tablet, Take 20 mg by mouth as needed , Disp: , Rfl:     Polyethylene Glycol 3350 (MIRALAX PO), Take by mouth as needed , Disp: , Rfl:     gabapentin (NEURONTIN) 300 MG capsule, Take 300 mg by mouth nightly , Disp: , Rfl:     glimepiride (AMARYL) 1 MG tablet, Take 2 mg by mouth 2 times daily , Disp: , Rfl:     fluticasone-vilanterol (BREO ELLIPTA) 100-25 MCG/INH AEPB, Inhale into the lungs, Disp: , Rfl:     LORazepam (ATIVAN) 1 MG tablet, Take 1 mg by mouth every 6 hours as needed for Anxiety (takes up to 5 tablets daily). , Disp: , Rfl:     aspirin 81 MG EC tablet, Take 81 mg by mouth nightly , Disp: , Rfl:     Cholecalciferol (VITAMIN D-3 PO), Take 2,000 Units by mouth nightly , Disp: , Rfl:     BIOTIN PO, Take 1,000 mcg by mouth nightly , Disp: , Rfl:     Melatonin 10 MG TABS, Take by mouth nightly , Disp: , Rfl:     Levalbuterol Tartrate (XOPENEX HFA IN), Inhale 1 Inhaler into the lungs , Disp: , Rfl:     Allergies: Allergies   Allergen Reactions    Penicillins Anaphylaxis and Shortness Of Breath    Sulfa Antibiotics Itching and Swelling       Vitals:    10/27/21 1003   Weight: 215 lb (97.5 kg)   Height: 5' 8.5\" (1.74 m)       Exam:  Neurovascular status unchanged. Contusion site stable distal aspect right great toe. No ulceration present right great toe. Mild skin fissuring noted with surrounding erythema right great toe. No nail bed issues noted right great toe. No maceration of webspaces noted right foot. No increased calor, skin crepitation, or any ascending cellulitic issues noted right foot.       Diagnostic Studies:     No results found.      Procedures:    None    Plan Per Assessment  Pepper Chávez was seen today for toe pain. Diagnoses and all orders for this visit:    Contusion of right great toe without damage to nail, initial encounter  -     doxycycline hyclate (VIBRA-TABS) 100 MG tablet; Take 1 tablet by mouth 2 times daily for 14 days    Pain of toe of right foot    Type II diabetes mellitus with peripheral circulatory disorder (HCC)  -     ammonium lactate (LAC-HYDRIN) 12 % lotion; Apply topically daily. 1. Evaluation and management  2. We did send in 2 prescriptions 1 topical Lac-Hydrin to apply to the distal right great toe region daily. Prescription for oral doxycycline was also given today due to some erythema present right great toe. 3. We did advise the patient on appropriate diabetic foot care techniques and continued use of her good supportive shoe gear to prevent any further irritation to the digital regions right foot. 4. Patient will be followed up at a later date for continued evaluation and management. Seen By:    Chandrakant Casey DPM    Electronically signed by Chandrakant Casey DPM on 10/27/2021 at 10:54 AM    This note was created using voice recognition software. The note was reviewed however may contain grammatical errors.

## 2021-11-05 ENCOUNTER — OFFICE VISIT (OUTPATIENT)
Dept: PODIATRY | Age: 71
End: 2021-11-05
Payer: MEDICARE

## 2021-11-05 VITALS — BODY MASS INDEX: 31.84 KG/M2 | WEIGHT: 215 LBS | HEIGHT: 69 IN

## 2021-11-05 DIAGNOSIS — S90.111D CONTUSION OF RIGHT GREAT TOE WITHOUT DAMAGE TO NAIL, SUBSEQUENT ENCOUNTER: Primary | ICD-10-CM

## 2021-11-05 DIAGNOSIS — E11.51 TYPE II DIABETES MELLITUS WITH PERIPHERAL CIRCULATORY DISORDER (HCC): ICD-10-CM

## 2021-11-05 PROCEDURE — G8400 PT W/DXA NO RESULTS DOC: HCPCS | Performed by: PODIATRIST

## 2021-11-05 PROCEDURE — G8427 DOCREV CUR MEDS BY ELIG CLIN: HCPCS | Performed by: PODIATRIST

## 2021-11-05 PROCEDURE — 99213 OFFICE O/P EST LOW 20 MIN: CPT | Performed by: PODIATRIST

## 2021-11-05 PROCEDURE — 3046F HEMOGLOBIN A1C LEVEL >9.0%: CPT | Performed by: PODIATRIST

## 2021-11-05 PROCEDURE — G8417 CALC BMI ABV UP PARAM F/U: HCPCS | Performed by: PODIATRIST

## 2021-11-05 PROCEDURE — 4040F PNEUMOC VAC/ADMIN/RCVD: CPT | Performed by: PODIATRIST

## 2021-11-05 PROCEDURE — G8484 FLU IMMUNIZE NO ADMIN: HCPCS | Performed by: PODIATRIST

## 2021-11-05 PROCEDURE — 2022F DILAT RTA XM EVC RTNOPTHY: CPT | Performed by: PODIATRIST

## 2021-11-05 PROCEDURE — 1090F PRES/ABSN URINE INCON ASSESS: CPT | Performed by: PODIATRIST

## 2021-11-05 PROCEDURE — 3017F COLORECTAL CA SCREEN DOC REV: CPT | Performed by: PODIATRIST

## 2021-11-05 PROCEDURE — 1123F ACP DISCUSS/DSCN MKR DOCD: CPT | Performed by: PODIATRIST

## 2021-11-05 PROCEDURE — 1036F TOBACCO NON-USER: CPT | Performed by: PODIATRIST

## 2021-11-05 NOTE — PROGRESS NOTES
tablet, Take 500 mcg by mouth daily, Disp: , Rfl:     promethazine (PHENERGAN) 25 MG tablet, Take 25 mg by mouth every 8 hours as needed , Disp: , Rfl:     torsemide (DEMADEX) 20 MG tablet, Take 20 mg by mouth as needed , Disp: , Rfl:     Polyethylene Glycol 3350 (MIRALAX PO), Take by mouth as needed , Disp: , Rfl:     gabapentin (NEURONTIN) 300 MG capsule, Take 300 mg by mouth nightly , Disp: , Rfl:     glimepiride (AMARYL) 1 MG tablet, Take 2 mg by mouth 2 times daily , Disp: , Rfl:     fluticasone-vilanterol (BREO ELLIPTA) 100-25 MCG/INH AEPB, Inhale into the lungs, Disp: , Rfl:     LORazepam (ATIVAN) 1 MG tablet, Take 1 mg by mouth every 6 hours as needed for Anxiety (takes up to 5 tablets daily). , Disp: , Rfl:     aspirin 81 MG EC tablet, Take 81 mg by mouth nightly , Disp: , Rfl:     Cholecalciferol (VITAMIN D-3 PO), Take 2,000 Units by mouth nightly , Disp: , Rfl:     BIOTIN PO, Take 1,000 mcg by mouth nightly , Disp: , Rfl:     Melatonin 10 MG TABS, Take by mouth nightly , Disp: , Rfl:     Levalbuterol Tartrate (XOPENEX HFA IN), Inhale 1 Inhaler into the lungs , Disp: , Rfl:     Allergies: Allergies   Allergen Reactions    Penicillins Anaphylaxis and Shortness Of Breath    Sulfa Antibiotics Itching and Swelling       Vitals:    11/05/21 1138   Weight: 215 lb (97.5 kg)   Height: 5' 8.5\" (1.74 m)       Exam:  Neurovascular status unchanged. Distal aspect right great toe stable at this time. No ulceration or any signs of infection noted right great toe. Xerotic changes are improved with current topical regimen. No maceration webspaces noted right foot. Diagnostic Studies:     No results found. Procedures:    None    Plan Per Assessment  Perez Heidi was seen today for follow-up. Diagnoses and all orders for this visit:    Contusion of right great toe without damage to nail, subsequent encounter    Type II diabetes mellitus with peripheral circulatory disorder ([facility] [address]      1.  Evaluation and management  2. We did discuss continued treatment options with patient in detail today in regards to her diabetic foot care techniques. 3. Patient is to continue taking the oral antibiotics until finished and continued use of the topical medications as well. 4. Patient will be followed up at a later date for continued evaluation and care. Seen By:    Tevin Tolbert DPM    Electronically signed by Tevin Tolbert DPM on 11/5/2021 at 11:51 AM    This note was created using voice recognition software. The note was reviewed however may contain grammatical errors.

## 2021-11-05 NOTE — PROGRESS NOTES
Patient is here for right great toe. Patient states improvement.  Ghazal Rodriguez MD  Electronically signed by Anthony Adames LPN on 93/7/8456 at 66:08 AM

## 2022-01-11 ENCOUNTER — OFFICE VISIT (OUTPATIENT)
Dept: NON INVASIVE DIAGNOSTICS | Age: 72
End: 2022-01-11
Payer: MEDICARE

## 2022-01-11 VITALS
RESPIRATION RATE: 18 BRPM | SYSTOLIC BLOOD PRESSURE: 120 MMHG | HEIGHT: 69 IN | OXYGEN SATURATION: 98 % | WEIGHT: 214 LBS | DIASTOLIC BLOOD PRESSURE: 70 MMHG | HEART RATE: 79 BPM | BODY MASS INDEX: 31.7 KG/M2

## 2022-01-11 DIAGNOSIS — Z95.0 PACEMAKER: ICD-10-CM

## 2022-01-11 DIAGNOSIS — R55 SYNCOPE AND COLLAPSE: ICD-10-CM

## 2022-01-11 DIAGNOSIS — R55 NEAR SYNCOPE: ICD-10-CM

## 2022-01-11 DIAGNOSIS — I49.5 SSS (SICK SINUS SYNDROME) (HCC): Primary | ICD-10-CM

## 2022-01-11 PROBLEM — R40.0 DAYTIME SOMNOLENCE: Status: ACTIVE | Noted: 2022-01-11

## 2022-01-11 PROBLEM — J96.10 CHRONIC RESPIRATORY FAILURE (HCC): Status: ACTIVE | Noted: 2022-01-11

## 2022-01-11 PROBLEM — J34.89 POSTERIOR RHINORRHEA: Status: ACTIVE | Noted: 2022-01-11

## 2022-01-11 PROBLEM — J43.2 CENTRILOBULAR EMPHYSEMA (HCC): Status: ACTIVE | Noted: 2022-01-11

## 2022-01-11 PROBLEM — G47.9 SLEEP DISTURBANCE: Status: ACTIVE | Noted: 2022-01-11

## 2022-01-11 PROCEDURE — G8400 PT W/DXA NO RESULTS DOC: HCPCS | Performed by: STUDENT IN AN ORGANIZED HEALTH CARE EDUCATION/TRAINING PROGRAM

## 2022-01-11 PROCEDURE — G8417 CALC BMI ABV UP PARAM F/U: HCPCS | Performed by: STUDENT IN AN ORGANIZED HEALTH CARE EDUCATION/TRAINING PROGRAM

## 2022-01-11 PROCEDURE — 1036F TOBACCO NON-USER: CPT | Performed by: STUDENT IN AN ORGANIZED HEALTH CARE EDUCATION/TRAINING PROGRAM

## 2022-01-11 PROCEDURE — G8484 FLU IMMUNIZE NO ADMIN: HCPCS | Performed by: STUDENT IN AN ORGANIZED HEALTH CARE EDUCATION/TRAINING PROGRAM

## 2022-01-11 PROCEDURE — 3017F COLORECTAL CA SCREEN DOC REV: CPT | Performed by: STUDENT IN AN ORGANIZED HEALTH CARE EDUCATION/TRAINING PROGRAM

## 2022-01-11 PROCEDURE — G8427 DOCREV CUR MEDS BY ELIG CLIN: HCPCS | Performed by: STUDENT IN AN ORGANIZED HEALTH CARE EDUCATION/TRAINING PROGRAM

## 2022-01-11 PROCEDURE — 99417 PROLNG OP E/M EACH 15 MIN: CPT | Performed by: STUDENT IN AN ORGANIZED HEALTH CARE EDUCATION/TRAINING PROGRAM

## 2022-01-11 PROCEDURE — 1090F PRES/ABSN URINE INCON ASSESS: CPT | Performed by: STUDENT IN AN ORGANIZED HEALTH CARE EDUCATION/TRAINING PROGRAM

## 2022-01-11 PROCEDURE — 1123F ACP DISCUSS/DSCN MKR DOCD: CPT | Performed by: STUDENT IN AN ORGANIZED HEALTH CARE EDUCATION/TRAINING PROGRAM

## 2022-01-11 PROCEDURE — 4040F PNEUMOC VAC/ADMIN/RCVD: CPT | Performed by: STUDENT IN AN ORGANIZED HEALTH CARE EDUCATION/TRAINING PROGRAM

## 2022-01-11 PROCEDURE — 99215 OFFICE O/P EST HI 40 MIN: CPT | Performed by: STUDENT IN AN ORGANIZED HEALTH CARE EDUCATION/TRAINING PROGRAM

## 2022-01-11 RX ORDER — LORAZEPAM 0.5 MG/1
0.5 TABLET ORAL 3 TIMES DAILY
COMMUNITY
Start: 2021-11-10

## 2022-01-11 RX ORDER — IPRATROPIUM BROMIDE AND ALBUTEROL SULFATE 2.5; .5 MG/3ML; MG/3ML
3 SOLUTION RESPIRATORY (INHALATION) 2 TIMES DAILY
COMMUNITY
Start: 2021-12-07

## 2022-01-11 RX ORDER — ROSUVASTATIN CALCIUM 20 MG/1
20 TABLET, COATED ORAL DAILY
COMMUNITY
Start: 2021-11-08 | End: 2022-09-14

## 2022-01-11 RX ORDER — BUDESONIDE, GLYCOPYRROLATE, AND FORMOTEROL FUMARATE 160; 9; 4.8 UG/1; UG/1; UG/1
2 AEROSOL, METERED RESPIRATORY (INHALATION) 2 TIMES DAILY
COMMUNITY
Start: 2021-11-10

## 2022-01-11 RX ORDER — SPIRONOLACTONE 25 MG/1
25 TABLET ORAL DAILY
COMMUNITY
Start: 2021-12-03

## 2022-01-11 NOTE — PROGRESS NOTES
[address] ELECTROPHYSIOLOGY DEPARTMENT/DIVISION OF CARDIOLOGY  Outpatient Progress Report  PATIENT: [facility] RECORD NUMBER: <[id number]>  DATE OF SERVICE:  1/11/2022  ATTENDING ELECTROPHYSIOLOGIST:  Rola Luna DO  REFERRING PHYSICIAN: No ref. provider found and Daryle Lab, MD  CHIEF COMPLAINT: pacemaker dysfunction    HPI: Pamela Winters is a 70 y.o. 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 Her medications include aspirin 81 mg daily, benazepril 10 mg daily, crestor 20 mg daily, spironolactone 12.5 mg daily, and torsemide 20 mg PRN. In 8/2016, she was admitted for syncope during toilet use. She had a recurrence of syncope during toilet use during admit, which was associated with 18 second episode of sinus arrest. She was evaluated by Dr Annette León, who implanted a MDT dc PPM. In 11/2016, she was noted to have short interval count (SIC) of 600. She was evaluated multiple times over the next few years, which reported stable lead function and SIC of ~200-500. She last saw Dr Annette León on 6/13/18 and was noted to have stable lead function with SIC of 0. She continued to be monitored remotely, but did not follow-up in-person again until 11/4/20 when she transferred EP care to Dr Walker. She was noted to have stable device function and SIC of 0. A remote interrogation on 11/2021 reported SIC of 2,694 since 2/26/21 with otherwise stable lead function. She presents today, 1/11/22; to transfer EP care to my office. She denies any recurrence of syncope since 2016. Despite programming MVP 60/130 bpm with AV delays of 150/180 msec, she is historically RA and RV paced < 0.1%. She denies any complaints at this time. She reports injuring her shoulder in 2018, but otherwise denies any trauma or falls.      Prior cardiac testing:  · TTE (6/3/21): LVEF = 60-65%, mild LAE. · ECG (5/10/19): SR 85 bpm, QTc = 395 msec. · Pharm Nuc Stress (16): LVEF = 85%, normal perfusion. · Limited TTE (16): LVEF = 70%. · TTE (4/17/15): LVEF = 65%, mild concentric LVH (1.1 cm). Past Medical History:   Diagnosis Date    [facility] [facility]     Depression     Diabetes ([facility] [address] [facility]     History of left shoulder fracture     Hypertension     Neuropathy     PONV (postoperative nausea and vomiting)     Sinus arrest     [facility]SS [facility]ick [facility] syndrome[facility])     Syncope and collapse      Past Surgical History:   Procedure Laterality Date    APPENDECTOMY      CARDIAC PACEMAKER PLACEMENT  2016     SECTION      x3    HYSTERECTOMY      LEG SURGERY Left 2021    PACEMAKER PLACEMENT Left 2016    TONSILLECTOMY      TUBAL LIGATION        Family History   Problem Relation Age of Onset    Alzheimer's Disease Father      There is no family history of sudden cardiac arrest    Social History     Tobacco Use    Smoking status: Former Smoker     Packs/day: 1.00     Start date: 1963     Quit date: 2006     Years since quitting: 15.4    Smokeless tobacco: Never Used   Substance Use Topics    Alcohol use: Yes     Comment: rare-       Current Outpatient Medications   Medication Sig Dispense Refill    Budeson-Glycopyrrol-Formoterol (BREZTRI AEROSPHERE) 160-9-4.8 MCG/ACT AERO Inhale 2 puffs into the lungs 2 times daily      ipratropium-albuterol (DUONEB) 0.5-2.5 (3) MG/3ML SOLN nebulizer solution Inhale 3 mLs into the lungs 2 times daily      linaclotide (LINZESS) 145 MCG capsule Take 145 mcg by mouth as needed      LORazepam (ATIVAN) 0.5 MG tablet Take 0.5 mg by mouth 3 times daily.  rosuvastatin (CRESTOR) 20 MG tablet Take 20 mg by mouth daily      spironolactone (ALDACTONE) 25 MG tablet Take 12.5 mg by mouth daily      ammonium lactate (LAC-HYDRIN) 12 % lotion Apply topically daily.  222 mL 5    albuterol sulfate HFA (VENTOLIN HFA) 108 (90 Base) MCG/ACT inhaler Inhale 2 puffs into the lungs every 6 hours as needed for Wheezing      metFORMIN (GLUCOPHAGE-XR) 750 MG extended release tablet Take 750 mg by mouth Daily with supper      omeprazole (PRILOSEC) 40 MG delayed release capsule Take 40 mg by mouth daily      OXYGEN Inhale 2 L into the lungs nightly as needed      phenelzine (NARDIL) 15 MG tablet Take 1 tablet by mouth 3 times daily 90 tablet 3    benazepril (LOTENSIN) 10 MG tablet Take 1 tablet by mouth nightly 30 tablet 3    vitamin B-12 (CYANOCOBALAMIN) 500 MCG tablet Take 500 mcg by mouth daily      promethazine (PHENERGAN) 25 MG tablet Take 25 mg by mouth every 8 hours as needed       torsemide (DEMADEX) 20 MG tablet Take 20 mg by mouth as needed       Polyethylene Glycol 3350 (MIRALAX PO) Take by mouth as needed       gabapentin (NEURONTIN) 300 MG capsule Take 300 mg by mouth nightly       glimepiride (AMARYL) 1 MG tablet Take 2 mg by mouth 2 times daily       aspirin 81 MG EC tablet Take 81 mg by mouth nightly       Cholecalciferol (VITAMIN D-3 PO) Take 2,000 Units by mouth nightly       BIOTIN PO Take 1,000 mcg by mouth nightly       Melatonin 10 MG TABS Take by mouth nightly        No current facility-administered medications for this visit. Allergies   Allergen Reactions    Penicillins Anaphylaxis and Shortness Of Breath    Sulfa Antibiotics Itching and Swelling     ROS:   Constitutional: Negative for fever, activity change and appetite change. HENT: Negative for epistaxis. Eyes: Negative for diploplia, blurred vision. Respiratory: Negative for cough, chest tightness, shortness of breath and wheezing. Cardiovascular: pertinent positives in HPI  Gastrointestinal: Negative for abdominal pain and blood in stool. Genitourinary: Negative for hematuria and difficulty urinating. Musculoskeletal: Negative for myalgias and gait problem. Skin: Negative for color change and rash. Neurological: Negative for syncope and light-headedness. Psychiatric/Behavioral: Negative for confusion and agitation. The patient is not nervous/anxious. Heme: no bleeding disorders, no melena or hematochezia  All other review of systems are negative     PHYSICAL EXAM:  Constitutional: Well-developed, no acute distress, well groomed, obese  Eyes: conjunctivae normal, no xanthelasma   Ears, Nose, Throat: oral mucosa moist, no cyanosis   Neck: supple, no JVD, no bruits, no thyromegaly   CV: normal rate, regular rhythm,  no murmurs, rubs, or gallops.  PMI is nondisplaced, Peripheral pulses normal including carotid auscultation, no noted aortic bruit, bilateral femoral and pedal pulses are normal in quality  Lungs: clear to auscultation bilaterally, normal respiratory effort without used of accessory muscles, no wheezes  Abdomen: soft, non-tender, bowel sounds present, no masses or hepatomegaly   Extremities: no digital clubbing, no edema   Skin: warm, no rashes, CIED incision C/D/I without erythema, edema, or drainage  Neuro/Psych: A&O x 3, normal mood and affect    Cardiac testing today:  · ECG (1/11/22): SR at 79 bpm, QTc = 395 msec  · Device Interrogation/Reprogramming (1/11/22)  · Make/Model: STANISLAV Navarro DR  · DOI: 8/8/16  · Battery: 6 years  · Delpha Kennel therapy: MVP 60/130 ppm  · Pacing %: RA = < 0.1%, RV = < 0.1%  · Lead function:  · RA lead: sensing = 0.6 mV, impedance = 380 ohms, threshold = 0.5 V @ 0.4 msec  · RV lead: sensing = 4.4 mV, impedance = 456 ohms, threshold = 0.5 V @ 0.4 msec  · Lead programming:  · RA lead: sensitivity = 0.3 mV, output = 1.5 V @ 0.4 msec  · RV lead: sensitivity = 0.9 mV, output = 2.0 V @ 0.4 msec  · Arrhythmias: SIC 2,999 since 11/5/20  · Reprogramming included: DDD 40/130 bpm, Rate Drop Response turned on  · Overall device function is normal  · All device programmable settings were evaluated per above and in the scanned document, along with iterative adjustments (capture thresholds) to assess and select the most appropriate final programming to provide for consistent delivery of the appropriate therapy and to verify function of the device. Assessment/Plan:  1. vasovagal syncopal-cardiac depressor sp MDT dc PPM (DPI: 8/8/16)  -In 8/2016, she was admitted for syncope during toilet use. She had a recurrence of syncope during toilet use during admit, which was associated with 18 second episode of sinus arrest. She was evaluated by Dr Jordyn Mcbride, who implanted a MDT dc PPM. In 11/2016, she was noted to have short interval count (SIC) of 600. She was evaluated multiple times over the next few years, which reported stable lead function and SIC of ~200-500. She last saw Dr Jordyn Mcbride on 6/13/18 and was noted to have stable lead function with SIC of 0. She reports injuring her shoulder in 2018, but otherwise denies any trauma or falls. She continued to be monitored remotely, but did not follow-up in-person again until 11/4/20 when she transferred EP care to Dr Walker. She was noted to have stable device function and SIC of 0. A remote interrogation on 11/2021 reported SIC of 2,694 since 2/26/21 with otherwise stable lead function.   -Device interrogation reports stable lead function since at least 4/2020 based on available trends.  -At rest, she has noise on RA lead, but not RV lead. However, she has RV lead noise with provocative maneuvers on exam today.  -Continue remote monitoring every 91 days.  -Recommend PA and LAT CXR. -Her clinical history is consistent with vasovagal cardiac depressor syncope. She denies recurrence since PPM implanted, but she RA and RV paces < 0.1% historically. Reprogram pacemaker to DDD with LRL of 40 bpm and turn on Rate Drop Response to assess future need. -Follow-up with my office in 1 month. We had an introductory discussion regarding device dysfunction.  Introduction of noise may be due to any defect in the integrity of the lead and pacemaker header assembly. Going forward management options include monitoring with plan to abandon device if she no longer needs, revision with abandoning or extraction (unlikely given age, sex, comorbidities) of old leads if determined to be lead issue, or revision with retention of leads if felt to be pacemaker header issue. 2. COPD/emphysema on [place] O2    I spent a total of 70 minutes reviewing previous notes, test results, and face to face with the patient discussing the diagnosis and importance of compliance with the treatment plan as well as documenting on the day of the visit. Time of the day of service includes:  · Preparing to see the patient (eg. Review of the medical record, such as tests). · Obtaining and/or reviewing separately obtained history. · Ordering prescription medications, tests, and/or procedures. · Communicating results to the patient/family/caregiver. · Counseling/educating the patient/family/caregiver. · Documenting clinical information in the patients electronic record. · Coordination of care for the patient. · Performing a medical appropriate exam and/or evaluation. Thank you for allowing me to participate in your patient's care. Please call me if there are any questions. Avinash Ambrose D.O.   Cardiac Electrophysiology  [facility]    CC: Dr Yesenia Jenkins

## 2022-01-11 NOTE — PATIENT INSTRUCTIONS
No medication changes at this time. Obtain CXR at any [facility] facility. You do not need an appointment. Continue remote monitoring of pacemaker every 91 days. Follow-up with Dr Susie Bryant office in 4 weeks.

## 2022-01-19 ENCOUNTER — HOSPITAL ENCOUNTER (OUTPATIENT)
Age: 72
Discharge: HOME | End: 2022-01-21
Payer: MEDICARE

## 2022-01-19 ENCOUNTER — HOSPITAL ENCOUNTER (OUTPATIENT)
Dept: GENERAL RADIOLOGY | Age: 72
Discharge: HOME | End: 2022-01-21
Payer: MEDICARE

## 2022-01-19 DIAGNOSIS — Z95.0 PACEMAKER: ICD-10-CM

## 2022-01-19 PROCEDURE — 71046 X-RAY EXAM CHEST 2 VIEWS: CPT

## 2022-01-28 ENCOUNTER — PROCEDURE VISIT (OUTPATIENT)
Dept: PODIATRY | Age: 72
End: 2022-01-28
Payer: MEDICARE

## 2022-01-28 VITALS — BODY MASS INDEX: 31.7 KG/M2 | WEIGHT: 214 LBS | HEIGHT: 69 IN

## 2022-01-28 DIAGNOSIS — E11.51 TYPE II DIABETES MELLITUS WITH PERIPHERAL CIRCULATORY DISORDER (HCC): ICD-10-CM

## 2022-01-28 DIAGNOSIS — B35.1 ONYCHOMYCOSIS: Primary | ICD-10-CM

## 2022-01-28 DIAGNOSIS — I73.9 PVD (PERIPHERAL VASCULAR DISEASE) (HCC): ICD-10-CM

## 2022-01-28 DIAGNOSIS — E11.42 DIABETIC POLYNEUROPATHY ASSOCIATED WITH TYPE 2 DIABETES MELLITUS (HCC): ICD-10-CM

## 2022-01-28 DIAGNOSIS — R26.2 DIFFICULTY WALKING: ICD-10-CM

## 2022-01-28 PROCEDURE — 11721 DEBRIDE NAIL 6 OR MORE: CPT | Performed by: PODIATRIST

## 2022-01-28 NOTE — PROGRESS NOTES
Patient in today for nail care. Patient does not have any complaints of pain at this time.  Patient's PCP is Momo Falk MD date of last ov 1/11/2022        Dk Helms LPN

## 2022-01-28 NOTE — PROGRESS NOTES
22     Pamela Winters    : 1950  Sex: female  Age: 70 y.o. Subjective: The patient is seen today for evaluation regarding diabetic foot evaluation and mycotic nail. No other complaints noted. Chief Complaint   Patient presents with    Nail Problem       Current Medications:    Current Outpatient Medications:     Budeson-Glycopyrrol-Formoterol (BREZTRI AEROSPHERE) 160-9-4.8 MCG/ACT AERO, Inhale 2 puffs into the lungs 2 times daily, Disp: , Rfl:     ipratropium-albuterol (DUONEB) 0.5-2.5 (3) MG/3ML SOLN nebulizer solution, Inhale 3 mLs into the lungs 2 times daily, Disp: , Rfl:     linaclotide (LINZESS) 145 MCG capsule, Take 145 mcg by mouth as needed, Disp: , Rfl:     LORazepam (ATIVAN) 0.5 MG tablet, Take 0.5 mg by mouth 3 times daily. , Disp: , Rfl:     rosuvastatin (CRESTOR) 20 MG tablet, Take 20 mg by mouth daily, Disp: , Rfl:     spironolactone (ALDACTONE) 25 MG tablet, Take 12.5 mg by mouth daily, Disp: , Rfl:     ammonium lactate (LAC-HYDRIN) 12 % lotion, Apply topically daily. , Disp: 222 mL, Rfl: 5    albuterol sulfate HFA (VENTOLIN HFA) 108 (90 Base) MCG/ACT inhaler, Inhale 2 puffs into the lungs every 6 hours as needed for Wheezing, Disp: , Rfl:     metFORMIN (GLUCOPHAGE-XR) 750 MG extended release tablet, Take 750 mg by mouth Daily with supper, Disp: , Rfl:     omeprazole (PRILOSEC) 40 MG delayed release capsule, Take 40 mg by mouth daily, Disp: , Rfl:     OXYGEN, Inhale 2 L into the lungs nightly as needed, Disp: , Rfl:     phenelzine (NARDIL) 15 MG tablet, Take 1 tablet by mouth 3 times daily, Disp: 90 tablet, Rfl: 3    benazepril (LOTENSIN) 10 MG tablet, Take 1 tablet by mouth nightly, Disp: 30 tablet, Rfl: 3    vitamin B-12 (CYANOCOBALAMIN) 500 MCG tablet, Take 500 mcg by mouth daily, Disp: , Rfl:     promethazine (PHENERGAN) 25 MG tablet, Take 25 mg by mouth every 8 hours as needed , Disp: , Rfl:     torsemide (DEMADEX) 20 MG tablet, Take 20 mg by mouth as needed , Disp: , Rfl:     Polyethylene Glycol 3350 (MIRALAX PO), Take by mouth as needed , Disp: , Rfl:     gabapentin (NEURONTIN) 300 MG capsule, Take 300 mg by mouth nightly , Disp: , Rfl:     glimepiride (AMARYL) 1 MG tablet, Take 2 mg by mouth 2 times daily , Disp: , Rfl:     aspirin 81 MG EC tablet, Take 81 mg by mouth nightly , Disp: , Rfl:     Cholecalciferol (VITAMIN D-3 PO), Take 2,000 Units by mouth nightly , Disp: , Rfl:     BIOTIN PO, Take 1,000 mcg by mouth nightly , Disp: , Rfl:     Melatonin 10 MG TABS, Take by mouth nightly , Disp: , Rfl:     Allergies: Allergies   Allergen Reactions    Penicillins Anaphylaxis and Shortness Of Breath    Sulfa Antibiotics Itching and Swelling       Past Surgical History:   Procedure Laterality Date    APPENDECTOMY      CARDIAC PACEMAKER PLACEMENT  2016     SECTION      x3    HYSTERECTOMY      LEG SURGERY Left 2021    PACEMAKER PLACEMENT Left 2016    TONSILLECTOMY      TUBAL LIGATION       Past Medical History:   Diagnosis Date    [facility]) [facility] [address]     Depression     Diabetes [facility]     History of left shoulder fracture     Hypertension     Neuropathy     PONV (postoperative nausea and vomiting)     Sinus arrest     SSS (sick sinus syndrome) ([facility])     Syncope and collapse        Vitals:    22 1058   Weight: 214 lb (97.1 kg)   Height: 5' 8.5\" (1.74 m)       Exam:  Pedal pulses diminished to palpation bilateral foot. Capillary refill time delayed digital regions bilateral foot. At this time the nail/s 1, 2, 3, 4, 5 right foot and nail/s 1, 2, 3, 4, 5 left foot are noted to be thickened, dystrophic and discolored with subungual debris present. Paresthesias noted to both lower extremities. Absence of hair growth is noted to both lower extremities. Edema noted with ruborous changes noted digital regions bilateral foot. Coolness is noted to the digital regions to palpation. Capillary fill time delayed digital areas bilateral foot. No heel fissuring or macerations of the web spaces. No plantar calluses and/or ulcerative areas are noted. Patient is having difficulty with gait/walking. Plan Per Assessment  Natanael Kinney was seen today for nail problem. Diagnoses and all orders for this visit:    Onychomycosis    PVD (peripheral vascular disease) ([facility] [address]    Type II diabetes mellitus with peripheral circulatory disorder ([facility] [address]    Diabetic polyneuropathy associated with type 2 diabetes mellitus ([facility] [address]    Difficulty walking        1. Evaluation and Management  2. Manual and electrical debridement of the mycotic nails was performed for thickness and length to prevent injection and/or ulceration. 3. I recommended antifungal cream to the nails daily. We did discuss additional diabetic foot care techniques with patient in detail today. 4. It was discussed in detail with the patient proper caring for the vascular compromised foot. The fact that they have compromised blood flow put the patient at risk for infection/gangrene/amputation. The patient should not walk barefoot. Shoe gear should fit properly and socks should be worn with shoes. Exercise is very important to prevent worsening of the disease process but before performing an exercise program should check with their family physician first.  If any skin lesions are noted, they are instructed to contact the office immediately. 5. We will see the patient back at a later date for continued podiatric management and care. Patient was advised to call the office with any questions or concerns prior to their next appointment if needed. Seen By:    Tracy Calles DPM    Electronically signed by Tracy Calles DPM on 1/28/2022 at 11:12 AM      This note was created using voice recognition software. The note was reviewed however may contain grammatical errors.

## 2022-02-14 NOTE — PROGRESS NOTES
[address] ELECTROPHYSIOLOGY DEPARTMENT/DIVISION OF CARDIOLOGY  Outpatient Progress Report  PATIENT: [facility] RECORD NUMBER: <[id number]>  DATE OF SERVICE:  2/14/2022  ATTENDING ELECTROPHYSIOLOGIST:  Cheng Zurita DO  REFERRING PHYSICIAN: No ref. provider found and Beth Kelly MD  CHIEF COMPLAINT: pacemaker dysfunction    S: Helen Sarabia is a 70 y.o. 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 Her medications include aspirin 81 mg daily, benazepril 10 mg BID, crestor 20 mg daily, spironolactone 12.5 mg daily, torsemide 20 mg PRN, and PPI. In 8/2016, she was admitted for syncope during toilet use. She had a recurrence of syncope during toilet use during admit, which was associated with 18 second episode of sinus arrest. She was evaluated by Dr Juanjose Hunt, who implanted a MDT dc PPM. In 11/2016, she was noted to have short interval count (SIC) of 600. She was evaluated multiple times over the next few years, which reported stable lead function and SIC of ~200-500. She last saw Dr Juanjose Hunt on 6/13/18 and was noted to have stable lead function with SIC of 0. She continued to be monitored remotely, but did not follow-up in-person again until 11/4/20 when she transferred EP care to Dr Walker. She was noted to have stable device function and SIC of 0. A remote interrogation on 11/2021 reported SIC of 2,694 since 2/26/21 with otherwise stable lead function. In 1/2022, she transferred EP care to my office. She denied any recurrence of syncope since PPM implant. She reported injuring her shoulder in 2018, but otherwise denies any trauma or falls. On evaluation, she had stable device function. CXR revealed stable device appearance. Her device was reprogrammed to DDD 40 bpm and rate drop response turned on. She presents today, 2/15/22; for follow-up.  She denies any complaints at this time. Prior cardiac testing:  · ECG (22): SR at 79 bpm, QTc = 395 msec  · TTE (6/3/21): LVEF = 60-65%, mild LAE. · ECG (5/10/19): SR 85 bpm, QTc = 395 msec. · Pharm Nuc Stress (16): LVEF = 85%, normal perfusion. · Limited TTE (16): LVEF = 70%. · TTE (4/17/15): LVEF = 65%, mild concentric LVH (1.1 cm). Past Medical History:   Diagnosis Date    [facility]     Depression     Diabetes ([facility]     History of left shoulder fracture     Hypertension     Neuropathy     PONV (postoperative nausea and vomiting)     Sinus arrest     [facility]SS [facility]     Syncope and collapse      Past Surgical History:   Procedure Laterality Date    APPENDECTOMY      CARDIAC PACEMAKER PLACEMENT  2016     SECTION      x3    HYSTERECTOMY      LEG SURGERY Left 2021    PACEMAKER PLACEMENT Left 2016    TONSILLECTOMY      TUBAL LIGATION        Family History   Problem Relation Age of Onset    Alzheimer's Disease Father      There is no family history of sudden cardiac arrest    Social History     Tobacco Use    Smoking status: Former Smoker     Packs/day: 1.00     Start date: 1963     Quit date: 2006     Years since quitting: 15.5    Smokeless tobacco: Never Used   Substance Use Topics    Alcohol use: Yes     Comment: rare-       Current Outpatient Medications   Medication Sig Dispense Refill    Budeson-Glycopyrrol-Formoterol (BREZTRI AEROSPHERE) 160-9-4.8 MCG/ACT AERO Inhale 2 puffs into the lungs 2 times daily      ipratropium-albuterol (DUONEB) 0.5-2.5 (3) MG/3ML SOLN nebulizer solution Inhale 3 mLs into the lungs 2 times daily      linaclotide (LINZESS) 145 MCG capsule Take 145 mcg by mouth as needed      LORazepam (ATIVAN) 0.5 MG tablet Take 0.5 mg by mouth 3 times daily.       rosuvastatin (CRESTOR) 20 MG tablet Take 20 mg by mouth daily      spironolactone (ALDACTONE) 25 MG tablet Take 12.5 mg by mouth daily      ammonium lactate (LAC-HYDRIN) 12 % lotion Apply topically daily. 222 mL 5    albuterol sulfate HFA (VENTOLIN HFA) 108 (90 Base) MCG/ACT inhaler Inhale 2 puffs into the lungs every 6 hours as needed for Wheezing      metFORMIN (GLUCOPHAGE-XR) 750 MG extended release tablet Take 750 mg by mouth Daily with supper      omeprazole (PRILOSEC) 40 MG delayed release capsule Take 40 mg by mouth daily      OXYGEN Inhale 2 L into the lungs nightly as needed      phenelzine (NARDIL) 15 MG tablet Take 1 tablet by mouth 3 times daily 90 tablet 3    benazepril (LOTENSIN) 10 MG tablet Take 1 tablet by mouth nightly 30 tablet 3    vitamin B-12 (CYANOCOBALAMIN) 500 MCG tablet Take 500 mcg by mouth daily      promethazine (PHENERGAN) 25 MG tablet Take 25 mg by mouth every 8 hours as needed       torsemide (DEMADEX) 20 MG tablet Take 20 mg by mouth as needed       Polyethylene Glycol 3350 (MIRALAX PO) Take by mouth as needed       gabapentin (NEURONTIN) 300 MG capsule Take 300 mg by mouth nightly       glimepiride (AMARYL) 1 MG tablet Take 2 mg by mouth 2 times daily       aspirin 81 MG EC tablet Take 81 mg by mouth nightly       Cholecalciferol (VITAMIN D-3 PO) Take 2,000 Units by mouth nightly       BIOTIN PO Take 1,000 mcg by mouth nightly       Melatonin 10 MG TABS Take by mouth nightly        No current facility-administered medications for this visit. Allergies   Allergen Reactions    Penicillins Anaphylaxis and Shortness Of Breath    Sulfa Antibiotics Itching and Swelling     ROS:   Constitutional: Negative for fever, activity change and appetite change. HENT: Negative for epistaxis. Eyes: Negative for diploplia, blurred vision. Respiratory: Negative for cough, chest tightness, shortness of breath and wheezing. Cardiovascular: pertinent positives in HPI  Gastrointestinal: Negative for abdominal pain and blood in stool.    Genitourinary: Negative for hematuria and difficulty urinating. Musculoskeletal: Negative for myalgias and gait problem. Skin: Negative for color change and rash. Neurological: Negative for syncope and light-headedness. Psychiatric/Behavioral: Negative for confusion and agitation. The patient is not nervous/anxious. Heme: no bleeding disorders, no melena or hematochezia  All other review of systems are negative     PHYSICAL EXAM:  VS: /70   Pulse 87   Resp 20   Ht 5' 8\" (1.727 m)   Wt 214 lb (97.1 kg)   BMI 32.54 kg/m²    Constitutional: Well-developed, no acute distress, well groomed, obese  Eyes: conjunctivae normal, no xanthelasma   Ears, Nose, Throat: oral mucosa moist, no cyanosis   Neck: supple, no JVD, no bruits, no thyromegaly   CV: normal rate, regular rhythm,  no murmurs, rubs, or gallops.  PMI is nondisplaced, Peripheral pulses normal including carotid auscultation, no noted aortic bruit, bilateral femoral and pedal pulses are normal in quality  Lungs: clear to auscultation bilaterally, normal respiratory effort without used of accessory muscles, no wheezes  Abdomen: soft, non-tender, bowel sounds present, no masses or hepatomegaly   Extremities: no digital clubbing, no edema   Skin: warm, no rashes, CIED incision C/D/I without erythema, edema, or drainage  Neuro/Psych: A&O x 3, normal mood and affect    Cardiac testing today:  · ECG (2/15/22): SR at 87 bpm  Device Interrogation/Reprogramming (2/15/22)  Make/Model: ST[employer]ISLAV [facility]  DOI: 8/8/16  Battery: 6 years  Delpha Kennel therapy: DDD  ppm  Pacing %: RA < 0.1 %, RV = 0.2%  Lead function:  RA lead: sensing = 0.8 mV, impedance = 399 ohms, threshold = 0.5 V @ 0.4 msec  RV lead: sensing = 3.0 mV, impedance = 475 ohms, threshold = 0.5 V @ 0.4 msec  Lead programming:  RA lead: sensitivity = 0.3 mV, output = 1.5 V @ 0.4 msec  RV lead: sensitivity = 0.9 mV, output = 2.0 V @ 0.4 msec  Arrhythmias: 18 rate drop episodes  Reprogramming included: RV sensitivity adjusted to avoid SIC  Overall device function is normal  All device programmable settings were evaluated per above and in the scanned document, along with iterative adjustments (capture thresholds) to assess and select the most appropriate final programming to provide for consistent delivery of the appropriate therapy and to verify function of the device. Assessment/Plan:  1. vasovagal syncopal-cardiac depressor sp MDT dc PPM (DPI: 8/8/16)  -In 8/2016, she was admitted for syncope during toilet use. She had a recurrence of syncope during toilet use during admit, which was associated with 18 second episode of sinus arrest. No recurrence of syncope since PPM implant. Device interrogation today with 18 rate drop responses. -Since 11/2016, she has been noted to have elevated sensing integrity counter (SIC) > 300 from last interrogation, which can be an indicator of system dysfunction requiring intervention. However, as she had otherwise stable lead function, this was managed by Jada Brown and Denise without intervention. SIC typically due to oversensing of electrical noise, but also from oversensing of P- and T-waves and diaphragmatic potentials.  -Provactive maneuvers previously reported possible lead noise, but not appreciated today. -PA and LAT CXR revealed stable device position and no evidence of lead breakdown.  -Device interrogation reports stable lead function since at least 4/2020 based on available trends.   -Recommend change RV sensitivity to avoid SIC.  -Continue remote monitoring every 91 days.  -Follow-up with my office in 1 year. 2. COPD/emphysema on [place] O2    I spent a total of 70 minutes reviewing previous notes, test results, and face to face with the patient discussing the diagnosis and importance of compliance with the treatment plan as well as documenting on the day of the visit.      Time of the day of service includes:  · Preparing to see the patient (eg. Review of the medical record, such as tests). · Obtaining and/or reviewing separately obtained history. · Communicating results to the patient/family/caregiver. · Counseling/educating the patient/family/caregiver. · Documenting clinical information in the patients electronic record. · Coordination of care for the patient. · Performing a medical appropriate exam and/or evaluation. Thank you for allowing me to participate in your patient's care. Please call me if there are any questions. Hermilo Boyd D.O.   Cardiac Electrophysiology  [address] [place] [address]    CC: Dr Mann Medina

## 2022-02-15 ENCOUNTER — OFFICE VISIT (OUTPATIENT)
Dept: NON INVASIVE DIAGNOSTICS | Age: 72
End: 2022-02-15
Payer: MEDICARE

## 2022-02-15 VITALS
SYSTOLIC BLOOD PRESSURE: 130 MMHG | DIASTOLIC BLOOD PRESSURE: 70 MMHG | BODY MASS INDEX: 32.43 KG/M2 | RESPIRATION RATE: 20 BRPM | HEIGHT: 68 IN | HEART RATE: 87 BPM | WEIGHT: 214 LBS

## 2022-02-15 DIAGNOSIS — Z95.0 PACEMAKER: Primary | ICD-10-CM

## 2022-02-15 DIAGNOSIS — R55 NEAR SYNCOPE: ICD-10-CM

## 2022-02-15 DIAGNOSIS — I49.5 SSS (SICK SINUS SYNDROME) (HCC): ICD-10-CM

## 2022-02-15 PROCEDURE — 1090F PRES/ABSN URINE INCON ASSESS: CPT | Performed by: STUDENT IN AN ORGANIZED HEALTH CARE EDUCATION/TRAINING PROGRAM

## 2022-02-15 PROCEDURE — 99215 OFFICE O/P EST HI 40 MIN: CPT | Performed by: STUDENT IN AN ORGANIZED HEALTH CARE EDUCATION/TRAINING PROGRAM

## 2022-02-15 PROCEDURE — 3017F COLORECTAL CA SCREEN DOC REV: CPT | Performed by: STUDENT IN AN ORGANIZED HEALTH CARE EDUCATION/TRAINING PROGRAM

## 2022-02-15 PROCEDURE — G8417 CALC BMI ABV UP PARAM F/U: HCPCS | Performed by: STUDENT IN AN ORGANIZED HEALTH CARE EDUCATION/TRAINING PROGRAM

## 2022-02-15 PROCEDURE — G8484 FLU IMMUNIZE NO ADMIN: HCPCS | Performed by: STUDENT IN AN ORGANIZED HEALTH CARE EDUCATION/TRAINING PROGRAM

## 2022-02-15 PROCEDURE — G8427 DOCREV CUR MEDS BY ELIG CLIN: HCPCS | Performed by: STUDENT IN AN ORGANIZED HEALTH CARE EDUCATION/TRAINING PROGRAM

## 2022-02-15 PROCEDURE — 1123F ACP DISCUSS/DSCN MKR DOCD: CPT | Performed by: STUDENT IN AN ORGANIZED HEALTH CARE EDUCATION/TRAINING PROGRAM

## 2022-02-15 PROCEDURE — G8400 PT W/DXA NO RESULTS DOC: HCPCS | Performed by: STUDENT IN AN ORGANIZED HEALTH CARE EDUCATION/TRAINING PROGRAM

## 2022-02-15 PROCEDURE — 4040F PNEUMOC VAC/ADMIN/RCVD: CPT | Performed by: STUDENT IN AN ORGANIZED HEALTH CARE EDUCATION/TRAINING PROGRAM

## 2022-02-15 PROCEDURE — 1036F TOBACCO NON-USER: CPT | Performed by: STUDENT IN AN ORGANIZED HEALTH CARE EDUCATION/TRAINING PROGRAM

## 2022-02-15 NOTE — PATIENT INSTRUCTIONS
No medication changes at this time. Continue remote monitoring of pacemaker every 91 days. Follow-up with Dr John Wynn office in 1 year.

## 2022-04-11 ENCOUNTER — PROCEDURE VISIT (OUTPATIENT)
Dept: PODIATRY | Age: 72
End: 2022-04-11
Payer: MEDICARE

## 2022-04-11 VITALS — WEIGHT: 207 LBS | HEIGHT: 68 IN | BODY MASS INDEX: 31.37 KG/M2

## 2022-04-11 DIAGNOSIS — R26.2 DIFFICULTY WALKING: ICD-10-CM

## 2022-04-11 DIAGNOSIS — I73.9 PVD (PERIPHERAL VASCULAR DISEASE) (HCC): ICD-10-CM

## 2022-04-11 DIAGNOSIS — E11.51 TYPE II DIABETES MELLITUS WITH PERIPHERAL CIRCULATORY DISORDER (HCC): ICD-10-CM

## 2022-04-11 DIAGNOSIS — E11.42 DIABETIC POLYNEUROPATHY ASSOCIATED WITH TYPE 2 DIABETES MELLITUS (HCC): ICD-10-CM

## 2022-04-11 DIAGNOSIS — B35.1 ONYCHOMYCOSIS: Primary | ICD-10-CM

## 2022-04-11 PROCEDURE — 11721 DEBRIDE NAIL 6 OR MORE: CPT | Performed by: PODIATRIST

## 2022-04-11 NOTE — PROGRESS NOTES
Patient in today for nail care. Patient does not have any complaints of pain at this time.  Patient's PCP is Sheyla Yu MD date of last ov 1/11/22        Joaquin Alston LPN

## 2022-04-11 NOTE — PROGRESS NOTES
22     Nena Piña    : 1950  Sex: female  Age: 70 y.o. Subjective: The patient is seen today for evaluation regarding diabetic foot evaluation and mycotic nail care. No other complaints noted. Chief Complaint   Patient presents with    Nail Problem     nail care       Current Medications:    Current Outpatient Medications:     Budeson-Glycopyrrol-Formoterol (BREZTRI AEROSPHERE) 160-9-4.8 MCG/ACT AERO, Inhale 2 puffs into the lungs 2 times daily, Disp: , Rfl:     ipratropium-albuterol (DUONEB) 0.5-2.5 (3) MG/3ML SOLN nebulizer solution, Inhale 3 mLs into the lungs 2 times daily, Disp: , Rfl:     linaclotide (LINZESS) 145 MCG capsule, Take 145 mcg by mouth as needed, Disp: , Rfl:     LORazepam (ATIVAN) 0.5 MG tablet, Take 0.5 mg by mouth 3 times daily. , Disp: , Rfl:     rosuvastatin (CRESTOR) 20 MG tablet, Take 20 mg by mouth daily, Disp: , Rfl:     spironolactone (ALDACTONE) 25 MG tablet, Take 12.5 mg by mouth daily, Disp: , Rfl:     ammonium lactate (LAC-HYDRIN) 12 % lotion, Apply topically daily. , Disp: 222 mL, Rfl: 5    albuterol sulfate HFA (VENTOLIN HFA) 108 (90 Base) MCG/ACT inhaler, Inhale 2 puffs into the lungs every 6 hours as needed for Wheezing, Disp: , Rfl:     metFORMIN (GLUCOPHAGE-XR) 750 MG extended release tablet, Take 750 mg by mouth Daily with supper, Disp: , Rfl:     omeprazole (PRILOSEC) 40 MG delayed release capsule, Take 40 mg by mouth daily, Disp: , Rfl:     OXYGEN, Inhale 2 L into the lungs nightly as needed, Disp: , Rfl:     phenelzine (NARDIL) 15 MG tablet, Take 1 tablet by mouth 3 times daily, Disp: 90 tablet, Rfl: 3    benazepril (LOTENSIN) 10 MG tablet, Take 1 tablet by mouth nightly (Patient taking differently: Take 10 mg by mouth 2 times daily ), Disp: 30 tablet, Rfl: 3    vitamin B-12 (CYANOCOBALAMIN) 500 MCG tablet, Take 500 mcg by mouth daily, Disp: , Rfl:     promethazine (PHENERGAN) 25 MG tablet, Take 25 mg by mouth every 8 hours as needed , Disp: , Rfl:     torsemide (DEMADEX) 20 MG tablet, Take 20 mg by mouth as needed , Disp: , Rfl:     Polyethylene Glycol 3350 (MIRALAX PO), Take by mouth as needed , Disp: , Rfl:     gabapentin (NEURONTIN) 300 MG capsule, Take 300 mg by mouth nightly , Disp: , Rfl:     aspirin 81 MG EC tablet, Take 81 mg by mouth nightly , Disp: , Rfl:     Cholecalciferol (VITAMIN D-3 PO), Take 2,000 Units by mouth nightly , Disp: , Rfl:     BIOTIN PO, Take 1,000 mcg by mouth nightly , Disp: , Rfl:     Melatonin 10 MG TABS, Take by mouth nightly , Disp: , Rfl:     Allergies: Allergies   Allergen Reactions    Penicillins Anaphylaxis and Shortness Of Breath    Sulfa Antibiotics Itching and Swelling       Past Surgical History:   Procedure Laterality Date    APPENDECTOMY      CARDIAC PACEMAKER PLACEMENT  2016     SECTION      x3    HYSTERECTOMY      LEG SURGERY Left 2021    PACEMAKER PLACEMENT Left 2016    TONSILLECTOMY      TUBAL LIGATION       Past Medical History:   Diagnosis Date    [facility] disease) [facility] [address]     Depression     Diabetes ([facility])     History of left shoulder fracture     Hypertension     Neuropathy     PONV (postoperative nausea and vomiting)     Sinus arrest     SSS (sick sinus syndrome) ([facility])     Syncope and collapse        Vitals:    22 1330   Weight: 207 lb (93.9 kg)   Height: 5' 8\" (1.727 m)       Exam:  Pedal pulses diminished to palpation bilateral foot. Capillary refill time delayed digital regions bilateral foot. At this time the nail/s 1, 2, 3, 4, 5 right foot and nail/s 1, 2, 3, 4, 5 left foot are noted to be thickened, dystrophic and discolored with subungual debris present. Paresthesias noted to both lower extremities. Minimal hair growth is noted to both lower extremities. Edema noted with both varicosities and stasis skin changes present bilaterally. Coolness is noted to the digital regions to palpation. Capillary fill time delayed digital areas bilateral foot. No heel fissuring or macerations of the web spaces. No plantar calluses and/or ulcerative areas are noted. Patient is having difficulty with gait/walking. Plan Per Assessment  Ilda Medina was seen today for nail problem. Diagnoses and all orders for this visit:    Onychomycosis    PVD (peripheral vascular disease) ([facility] [address]    Type II diabetes mellitus with peripheral circulatory disorder ([facility] [address]    Diabetic polyneuropathy associated with type 2 diabetes mellitus ([facility] [address]    Difficulty walking        1. Evaluation and Management  2. Manual and electrical debridement of the mycotic nails was performed for thickness and length to prevent injection and/or ulceration. 3. I recommended antifungal cream to the nails daily. Discussed additional diabetic foot care techniques with patient in detail today. 4. It was discussed in detail with the patient proper caring for the vascular compromised foot. The fact that they have compromised blood flow put the patient at risk for infection/gangrene/amputation. The patient should not walk barefoot. Shoe gear should fit properly and socks should be worn with shoes. Exercise is very important to prevent worsening of the disease process but before performing an exercise program should check with their family physician first.  If any skin lesions are noted, they are instructed to contact the office immediately. 5. We will see the patient back at a later date for continued podiatric management and care. Patient was advised to call the office with any questions or concerns prior to their next appointment if needed. Seen By:    John Dubon DPM    Electronically signed by John Dubon DPM on 4/11/2022 at 1:43 PM      This note was created using voice recognition software. The note was reviewed however may contain grammatical errors.

## 2022-08-22 ENCOUNTER — PROCEDURE VISIT (OUTPATIENT)
Dept: PODIATRY | Age: 72
End: 2022-08-22
Payer: MEDICARE

## 2022-08-22 VITALS — BODY MASS INDEX: 30.66 KG/M2 | HEIGHT: 69 IN | WEIGHT: 207 LBS

## 2022-08-22 DIAGNOSIS — E11.51 TYPE II DIABETES MELLITUS WITH PERIPHERAL CIRCULATORY DISORDER (HCC): ICD-10-CM

## 2022-08-22 DIAGNOSIS — R26.2 DIFFICULTY WALKING: ICD-10-CM

## 2022-08-22 DIAGNOSIS — E11.42 DIABETIC POLYNEUROPATHY ASSOCIATED WITH TYPE 2 DIABETES MELLITUS (HCC): ICD-10-CM

## 2022-08-22 DIAGNOSIS — I73.9 PVD (PERIPHERAL VASCULAR DISEASE) (HCC): ICD-10-CM

## 2022-08-22 DIAGNOSIS — B35.1 ONYCHOMYCOSIS: Primary | ICD-10-CM

## 2022-08-22 PROCEDURE — 11721 DEBRIDE NAIL 6 OR MORE: CPT | Performed by: PODIATRIST

## 2022-08-22 NOTE — PROGRESS NOTES
22     Sudarshan Cockayne    : 1950  Sex: female  Age: 70 y.o. Subjective: The patient is seen today for evaluation regarding diabetic foot evaluation and mycotic nail care. No other complaints noted. Chief Complaint   Patient presents with    Nail Problem    Callouses       Current Medications:    Current Outpatient Medications:     Budeson-Glycopyrrol-Formoterol (BREZTRI AEROSPHERE) 160-9-4.8 MCG/ACT AERO, Inhale 2 puffs into the lungs 2 times daily, Disp: , Rfl:     ipratropium-albuterol (DUONEB) 0.5-2.5 (3) MG/3ML SOLN nebulizer solution, Inhale 3 mLs into the lungs 2 times daily, Disp: , Rfl:     linaclotide (LINZESS) 145 MCG capsule, Take 145 mcg by mouth as needed, Disp: , Rfl:     LORazepam (ATIVAN) 0.5 MG tablet, Take 0.5 mg by mouth 3 times daily. , Disp: , Rfl:     rosuvastatin (CRESTOR) 20 MG tablet, Take 20 mg by mouth daily, Disp: , Rfl:     spironolactone (ALDACTONE) 25 MG tablet, Take 12.5 mg by mouth daily, Disp: , Rfl:     ammonium lactate (LAC-HYDRIN) 12 % lotion, Apply topically daily. , Disp: 222 mL, Rfl: 5    albuterol sulfate HFA (PROVENTIL;VENTOLIN;PROAIR) 108 (90 Base) MCG/ACT inhaler, Inhale 2 puffs into the lungs every 6 hours as needed for Wheezing, Disp: , Rfl:     metFORMIN (GLUCOPHAGE-XR) 750 MG extended release tablet, Take 750 mg by mouth Daily with supper, Disp: , Rfl:     omeprazole (PRILOSEC) 40 MG delayed release capsule, Take 40 mg by mouth daily, Disp: , Rfl:     OXYGEN, Inhale 2 L into the lungs nightly as needed, Disp: , Rfl:     phenelzine (NARDIL) 15 MG tablet, Take 1 tablet by mouth 3 times daily, Disp: 90 tablet, Rfl: 3    benazepril (LOTENSIN) 10 MG tablet, Take 1 tablet by mouth nightly (Patient taking differently: Take 10 mg by mouth 2 times daily), Disp: 30 tablet, Rfl: 3    vitamin B-12 (CYANOCOBALAMIN) 500 MCG tablet, Take 500 mcg by mouth daily, Disp: , Rfl:     promethazine (PHENERGAN) 25 MG tablet, Take 25 mg by mouth every 8 hours as needed , Disp: , Rfl:     torsemide (DEMADEX) 20 MG tablet, Take 20 mg by mouth as needed , Disp: , Rfl:     Polyethylene Glycol 3350 (MIRALAX PO), Take by mouth as needed , Disp: , Rfl:     gabapentin (NEURONTIN) 300 MG capsule, Take 300 mg by mouth nightly , Disp: , Rfl:     aspirin 81 MG EC tablet, Take 81 mg by mouth nightly , Disp: , Rfl:     Cholecalciferol (VITAMIN D-3 PO), Take 2,000 Units by mouth nightly , Disp: , Rfl:     BIOTIN PO, Take 1,000 mcg by mouth nightly , Disp: , Rfl:     Melatonin 10 MG TABS, Take by mouth nightly , Disp: , Rfl:     Allergies: Allergies   Allergen Reactions    Penicillins Anaphylaxis and Shortness Of Breath    Sulfa Antibiotics Itching and Swelling       Past Surgical History:   Procedure Laterality Date    APPENDECTOMY      CARDIAC PACEMAKER PLACEMENT  2016     SECTION      x3    HYSTERECTOMY (CERVIX STATUS UNKNOWN)      LEG SURGERY Left 2021    PACEMAKER PLACEMENT Left 2016    TONSILLECTOMY      TUBAL LIGATION       Past Medical History:   Diagnosis Date    COPD (chronic obstructive pulmonary disease) ([facility] [address]     Depression     Diabetes ([facility] [address]     History of left shoulder fracture     Hypertension     Neuropathy     PONV (postoperative nausea and vomiting)     Sinus arrest     SSS (sick sinus syndrome) ([facility])     Syncope and collapse        Vitals:    22 1412   Weight: 207 lb (93.9 kg)   Height: 5' 8.5\" (1.74 m)       Exam:  Pedal pulses diminished to palpation bilateral foot. Capillary refill time delayed digital regions bilateral foot. At this time the nail/s 1, 2, 3, 4, 5 right foot and nail/s 1, 2, 3, 4, 5 left foot are noted to be thickened, dystrophic and discolored with subungual debris present. Paresthesias noted to both lower extremities. Absence of hair growth is noted to both lower extremities. Edema noted with ruborous skin changes and varicosities present bilaterally. Coolness is noted to the digital regions to palpation.   Capillary fill time delayed digital areas bilateral foot. No heel fissuring or macerations of the web spaces. No plantar calluses and/or ulcerative areas are noted. Patient is having difficulty with gait/walking. Plan Per Assessment  Marry Perez was seen today for nail problem and callouses. Diagnoses and all orders for this visit:    Onychomycosis    Diabetic polyneuropathy associated with type 2 diabetes mellitus ([facility] [address]    [facility]    Type II diabetes mellitus with peripheral circulatory disorder ([facility])    Difficulty walking        1. Evaluation and Management  2. Manual and electrical debridement of the mycotic nails was performed for thickness and length to prevent injection and/or ulceration. 3. Discussed additional diabetic lower extremity care techniques with patient today. 4. It was discussed in detail with the patient proper caring for the vascular compromised foot. The fact that they have compromised blood flow put the patient at risk for infection/gangrene/amputation. The patient should not walk barefoot. Shoe gear should fit properly and socks should be worn with shoes. If any skin lesions are noted, they are instructed to contact the office immediately. 5. We will see the patient back at a later date for continued podiatric management and care. Patient was advised to call the office with any questions or concerns prior to their next appointment if needed. Seen By:    Ricky Benz DPM    Electronically signed by Ricky Benz DPM on 8/22/2022 at 2:40 PM      This note was created using voice recognition software. The note was reviewed however may contain grammatical errors.

## 2022-09-14 RX ORDER — GLIPIZIDE 5 MG/1
2 TABLET ORAL
COMMUNITY

## 2022-09-14 RX ORDER — HYDROCODONE BITARTRATE AND ACETAMINOPHEN 5; 325 MG/1; MG/1
1 TABLET ORAL 3 TIMES DAILY
Status: ON HOLD | COMMUNITY
End: 2022-11-04 | Stop reason: HOSPADM

## 2022-09-14 NOTE — PROGRESS NOTES
History was reviewed with Dr Wendy Hernandez. He requires both Pulmonary and Cardiac clearances. Raleigh Brown at Dr Celena Rene office was notified.

## 2022-09-14 NOTE — PROGRESS NOTES
[facility] PRE-ADMISSION TESTING INSTRUCTIONS    The Preadmission Testing patient is instructed accordingly using the following criteria (check applicable):    ARRIVAL INSTRUCTIONS:  [x] Parking the day of Surgery is located in the Main Entrance lot. Upon entering the door, make an immediate right to the surgery reception desk    [x] Bring photo ID and insurance card    [x] Bring in a copy of Living will or Durable Power of  papers. [x] Please be sure to arrange for responsible adult to provide transportation to and from the hospital    [x] Please arrange for responsible adult to be with you for the 24 hour period post procedure due to having anesthesia    [x] If you awake am of surgery not feeling well or have temperature >100 please call [phone]    GENERAL INSTRUCTIONS:    [x] Nothing by mouth after midnight, including gum, candy, mints or water    [x] You may brush your teeth, but do not swallow any water    [x] Take medications as instructed with 1-2 oz of water    [x] Stop herbal supplements and vitamins 5 days prior to procedure    [] Follow preop dosing of blood thinners per physician instructions    [] Take 1/2 dose of evening insulin, but no insulin after midnight    [x] No oral diabetic medications after midnight    [x] If diabetic and have low blood sugar or feel symptomatic, take 1-2oz apple juice only    [x] Bring inhalers day of surgery    [] Bring C-PAP/ Bi-Pap day of surgery    [] Bring urine specimen day of surgery    [x] Shower or bath with soap, lather and rinse well, AM of Surgery, no lotion, powders or creams to surgical site    [] Follow bowel prep as instructed per surgeon    [x] No tobacco products within 24 hours of surgery     [x] No alcohol or illegal drug use within 24 hours of surgery.     [x] Jewelry, body piercing's, eyeglasses, contact lenses and dentures are not permitted into surgery (bring cases)      [x] Please do not wear any nail

## 2022-09-19 ENCOUNTER — PREP FOR PROCEDURE (OUTPATIENT)
Dept: SURGERY | Age: 72
End: 2022-09-19

## 2022-09-19 RX ORDER — SODIUM CHLORIDE 0.9 % (FLUSH) 0.9 %
5-40 SYRINGE (ML) INJECTION PRN
Status: CANCELLED | OUTPATIENT
Start: 2022-09-19

## 2022-09-19 RX ORDER — SODIUM CHLORIDE 0.9 % (FLUSH) 0.9 %
5-40 SYRINGE (ML) INJECTION EVERY 12 HOURS SCHEDULED
Status: CANCELLED | OUTPATIENT
Start: 2022-09-19

## 2022-09-19 RX ORDER — SODIUM CHLORIDE, SODIUM LACTATE, POTASSIUM CHLORIDE, CALCIUM CHLORIDE 600; 310; 30; 20 MG/100ML; MG/100ML; MG/100ML; MG/100ML
INJECTION, SOLUTION INTRAVENOUS CONTINUOUS
Status: CANCELLED | OUTPATIENT
Start: 2022-09-19

## 2022-09-19 NOTE — H&P
Date:   [facility]  Name: Setphon Lama             : 1950 Sex: F  Age: 70 yrs  Acct#:  [id number]          Patient was referred by Luis Angel Joseph M.D..  Patient's primary care provider is Luis Angel Joseph M.D.. CC:  Nausea, abdominal pain    HPI: A 66-year-old female well known to me. Previous workup for abdominal pain found to have biliary dyskinesia. Gallbladder ejection fraction 22%. She has had worsening symptoms similar to before. She is hopeful to schedule cholecystectomy. She wears oxygen at night only. No chest pain. Fairly active. Mild chronic dyspnea. Seen regularly by her cardiologist as well as pulmonology and her primary physician. She also complains of a lump on the left arm which is occasionally painful. She has had a history of a lipoma removed in another area.     Meds Prior to Visit:  Daliresp  500 mcg  once daily  Melatonin  10 mg   Omeprazole  40 mg  take 1 capsule by mouth every day  Phenergan  12.5 mg   Gabapentin  100 mg   Torsemide  5 mg   Vitamin D  2000 Unit   Glimepiride     Hydrocodone Bitartrate/Acetaminophen     Promethazine HCL     Lorazepam     B12     Zinc     Linzess  72 mcg  one tablet every morning by mouth     Allergies:  Penicillins        Wt Prior: 210lb as of 19    Exam:  Constitution:  Non-toxic, no acute distress  Heart :  RRR  Lungs CTA bilaterally  Abdomen: Soft and nondistended, nontender  Extremeties: No rash, cyanosis, or jaundice; 3 cm subcutaneous mass consistent with lipoma left upper extremity         Assessment #1: Hx K82.8 Other specified diseases of gallbladder   Care Plan:              Comments       :  Laparoscopic cholecystectomy scheduled     Assessment #2: Hx D17.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 Despite all efforts to prevent them, transcription errors may have occurred.   Seen by:

## 2022-09-19 NOTE — H&P (VIEW-ONLY)
Date:   [facility]  Name: Lyn Larson             : 1950 Sex: F  Age: 70 yrs  Acct#:  [id number]          Patient was referred by Asa Ma M.D..  Patient's primary care provider is Asa Ma M.D.. CC:  Nausea, abdominal pain    HPI: A 70-year-old female well known to me. Previous workup for abdominal pain found to have biliary dyskinesia. Gallbladder ejection fraction 22%. She has had worsening symptoms similar to before. She is hopeful to schedule cholecystectomy. She wears oxygen at night only. No chest pain. Fairly active. Mild chronic dyspnea. Seen regularly by her cardiologist as well as pulmonology and her primary physician. She also complains of a lump on the left arm which is occasionally painful. She has had a history of a lipoma removed in another area.     Meds Prior to Visit:  Daliresp  500 mcg  once daily  Melatonin  10 mg   Omeprazole  40 mg  take 1 capsule by mouth every day  Phenergan  12.5 mg   Gabapentin  100 mg   Torsemide  5 mg   Vitamin D  2000 Unit   Glimepiride     Hydrocodone Bitartrate/Acetaminophen     Promethazine HCL     Lorazepam     B12     Zinc     Linzess  72 mcg  one tablet every morning by mouth     Allergies:  Penicillins        Wt Prior: 210lb as of 19    Exam:  Constitution:  Non-toxic, no acute distress  Heart :  RRR  Lungs CTA bilaterally  Abdomen: Soft and nondistended, nontender  Extremeties: No rash, cyanosis, or jaundice; 3 cm subcutaneous mass consistent with lipoma left upper extremity         Assessment #1: Hx K82.8 Other specified diseases of gallbladder   Care Plan:              Comments       :  Laparoscopic cholecystectomy scheduled     Assessment #2: Hx D17.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 Despite all efforts to prevent them, transcription errors may have occurred.   Seen by:

## 2022-09-20 ENCOUNTER — HOSPITAL ENCOUNTER (OUTPATIENT)
Dept: GENERAL RADIOLOGY | Age: 72
Setting detail: OUTPATIENT SURGERY
Discharge: HOME | End: 2022-09-22
Attending: SURGERY
Payer: MEDICARE

## 2022-09-20 ENCOUNTER — HOSPITAL ENCOUNTER (OUTPATIENT)
Age: 72
Setting detail: OUTPATIENT SURGERY
Discharge: HOME | End: 2022-09-20
Attending: SURGERY | Admitting: SURGERY
Payer: MEDICARE

## 2022-09-20 ENCOUNTER — ANESTHESIA EVENT (OUTPATIENT)
Dept: OPERATING ROOM | Age: 72
End: 2022-09-20
Payer: MEDICARE

## 2022-09-20 ENCOUNTER — ANESTHESIA (OUTPATIENT)
Dept: OPERATING ROOM | Age: 72
End: 2022-09-20
Payer: MEDICARE

## 2022-09-20 VITALS
RESPIRATION RATE: 18 BRPM | TEMPERATURE: 98.2 F | DIASTOLIC BLOOD PRESSURE: 78 MMHG | BODY MASS INDEX: 29.62 KG/M2 | HEART RATE: 78 BPM | OXYGEN SATURATION: 96 % | HEIGHT: 69 IN | SYSTOLIC BLOOD PRESSURE: 174 MMHG | WEIGHT: 200 LBS

## 2022-09-20 DIAGNOSIS — K82.8 BILIARY DYSKINESIA: ICD-10-CM

## 2022-09-20 DIAGNOSIS — D17.22 LIPOMA OF LEFT UPPER EXTREMITY: ICD-10-CM

## 2022-09-20 DIAGNOSIS — R52 PAIN: ICD-10-CM

## 2022-09-20 LAB
ANION GAP SERPL CALCULATED.3IONS-SCNC: 13 MMOL/L (ref 7–16)
BUN BLDV-MCNC: 21 MG/DL (ref 6–23)
CALCIUM SERPL-MCNC: 10.5 MG/DL (ref 8.6–10.2)
CHLORIDE BLD-SCNC: 98 MMOL/L (ref 98–107)
CO2: 27 MMOL/L (ref 22–29)
CREAT SERPL-MCNC: 1.2 MG/DL (ref 0.5–1)
EKG ATRIAL RATE: 90 BPM
EKG P AXIS: 22 DEGREES
EKG P-R INTERVAL: 126 MS
EKG Q-T INTERVAL: 356 MS
EKG QRS DURATION: 76 MS
EKG QTC CALCULATION (BAZETT): 435 MS
EKG R AXIS: 1 DEGREES
EKG T AXIS: 36 DEGREES
EKG VENTRICULAR RATE: 90 BPM
GFR AFRICAN AMERICAN: 53
GFR NON-AFRICAN AMERICAN: 44 ML/MIN/1.73
GLUCOSE BLD-MCNC: 169 MG/DL (ref 74–99)
HCT VFR BLD CALC: 40.5 % (ref 34–48)
HEMOGLOBIN: 13.6 G/DL (ref 11.5–15.5)
MCH RBC QN AUTO: 28.9 PG (ref 26–35)
MCHC RBC AUTO-ENTMCNC: 33.6 % (ref 32–34.5)
MCV RBC AUTO: 86 FL (ref 80–99.9)
PDW BLD-RTO: 12.1 FL (ref 11.5–15)
PLATELET # BLD: 313 E9/L (ref 130–450)
PMV BLD AUTO: 8.8 FL (ref 7–12)
POTASSIUM SERPL-SCNC: 4.9 MMOL/L (ref 3.5–5)
RBC # BLD: 4.71 E12/L (ref 3.5–5.5)
SODIUM BLD-SCNC: 138 MMOL/L (ref 132–146)
WBC # BLD: 14.5 E9/L (ref 4.5–11.5)

## 2022-09-20 PROCEDURE — 2709999900 HC NON-CHARGEABLE SUPPLY: Performed by: SURGERY

## 2022-09-20 PROCEDURE — 3600000004 HC SURGERY LEVEL 4 BASE: Performed by: SURGERY

## 2022-09-20 PROCEDURE — 6370000000 HC RX 637 (ALT 250 FOR IP)

## 2022-09-20 PROCEDURE — 3700000000 HC ANESTHESIA ATTENDED CARE: Performed by: SURGERY

## 2022-09-20 PROCEDURE — 93005 ELECTROCARDIOGRAM TRACING: CPT

## 2022-09-20 PROCEDURE — 2500000003 HC RX 250 WO HCPCS

## 2022-09-20 PROCEDURE — 3700000001 HC ADD 15 MINUTES (ANESTHESIA): Performed by: SURGERY

## 2022-09-20 PROCEDURE — 85027 COMPLETE CBC AUTOMATED: CPT

## 2022-09-20 PROCEDURE — 3600000014 HC SURGERY LEVEL 4 ADDTL 15MIN: Performed by: SURGERY

## 2022-09-20 PROCEDURE — 2500000003 HC RX 250 WO HCPCS: Performed by: SURGERY

## 2022-09-20 PROCEDURE — 36415 COLL VENOUS BLD VENIPUNCTURE: CPT

## 2022-09-20 PROCEDURE — 7100000011 HC PHASE II RECOVERY - ADDTL 15 MIN: Performed by: SURGERY

## 2022-09-20 PROCEDURE — 6360000002 HC RX W HCPCS: Performed by: ANESTHESIOLOGY

## 2022-09-20 PROCEDURE — 7100000001 HC PACU RECOVERY - ADDTL 15 MIN: Performed by: SURGERY

## 2022-09-20 PROCEDURE — 6360000002 HC RX W HCPCS

## 2022-09-20 PROCEDURE — 7100000000 HC PACU RECOVERY - FIRST 15 MIN: Performed by: SURGERY

## 2022-09-20 PROCEDURE — 7100000010 HC PHASE II RECOVERY - FIRST 15 MIN: Performed by: SURGERY

## 2022-09-20 PROCEDURE — 2580000003 HC RX 258

## 2022-09-20 PROCEDURE — 88304 TISSUE EXAM BY PATHOLOGIST: CPT

## 2022-09-20 PROCEDURE — 2720000010 HC SURG SUPPLY STERILE: Performed by: SURGERY

## 2022-09-20 PROCEDURE — 80048 BASIC METABOLIC PNL TOTAL CA: CPT

## 2022-09-20 RX ORDER — NEOSTIGMINE METHYLSULFATE 1 MG/ML
INJECTION, SOLUTION INTRAVENOUS PRN
Status: DISCONTINUED | OUTPATIENT
Start: 2022-09-20 | End: 2022-09-20 | Stop reason: SDUPTHER

## 2022-09-20 RX ORDER — DEXAMETHASONE SODIUM PHOSPHATE 4 MG/ML
INJECTION, SOLUTION INTRA-ARTICULAR; INTRALESIONAL; INTRAMUSCULAR; INTRAVENOUS; SOFT TISSUE PRN
Status: DISCONTINUED | OUTPATIENT
Start: 2022-09-20 | End: 2022-09-20 | Stop reason: SDUPTHER

## 2022-09-20 RX ORDER — LABETALOL HYDROCHLORIDE 5 MG/ML
INJECTION, SOLUTION INTRAVENOUS PRN
Status: DISCONTINUED | OUTPATIENT
Start: 2022-09-20 | End: 2022-09-20 | Stop reason: SDUPTHER

## 2022-09-20 RX ORDER — GLYCOPYRROLATE 0.2 MG/ML
INJECTION INTRAMUSCULAR; INTRAVENOUS PRN
Status: DISCONTINUED | OUTPATIENT
Start: 2022-09-20 | End: 2022-09-20 | Stop reason: SDUPTHER

## 2022-09-20 RX ORDER — SODIUM CHLORIDE 9 MG/ML
INJECTION, SOLUTION INTRAVENOUS PRN
Status: DISCONTINUED | OUTPATIENT
Start: 2022-09-20 | End: 2022-09-20 | Stop reason: HOSPADM

## 2022-09-20 RX ORDER — SODIUM CHLORIDE, SODIUM LACTATE, POTASSIUM CHLORIDE, CALCIUM CHLORIDE 600; 310; 30; 20 MG/100ML; MG/100ML; MG/100ML; MG/100ML
INJECTION, SOLUTION INTRAVENOUS CONTINUOUS
Status: DISCONTINUED | OUTPATIENT
Start: 2022-09-20 | End: 2022-09-20 | Stop reason: HOSPADM

## 2022-09-20 RX ORDER — ROCURONIUM BROMIDE 10 MG/ML
INJECTION, SOLUTION INTRAVENOUS PRN
Status: DISCONTINUED | OUTPATIENT
Start: 2022-09-20 | End: 2022-09-20 | Stop reason: SDUPTHER

## 2022-09-20 RX ORDER — SODIUM CHLORIDE 0.9 % (FLUSH) 0.9 %
5-40 SYRINGE (ML) INJECTION EVERY 12 HOURS SCHEDULED
Status: DISCONTINUED | OUTPATIENT
Start: 2022-09-20 | End: 2022-09-20 | Stop reason: HOSPADM

## 2022-09-20 RX ORDER — FENTANYL CITRATE 50 UG/ML
INJECTION, SOLUTION INTRAMUSCULAR; INTRAVENOUS PRN
Status: DISCONTINUED | OUTPATIENT
Start: 2022-09-20 | End: 2022-09-20 | Stop reason: SDUPTHER

## 2022-09-20 RX ORDER — KETOROLAC TROMETHAMINE 30 MG/ML
INJECTION, SOLUTION INTRAMUSCULAR; INTRAVENOUS PRN
Status: DISCONTINUED | OUTPATIENT
Start: 2022-09-20 | End: 2022-09-20 | Stop reason: SDUPTHER

## 2022-09-20 RX ORDER — ONDANSETRON 2 MG/ML
4 INJECTION INTRAMUSCULAR; INTRAVENOUS
Status: DISCONTINUED | OUTPATIENT
Start: 2022-09-20 | End: 2022-09-20 | Stop reason: HOSPADM

## 2022-09-20 RX ORDER — PROPOFOL 10 MG/ML
INJECTION, EMULSION INTRAVENOUS PRN
Status: DISCONTINUED | OUTPATIENT
Start: 2022-09-20 | End: 2022-09-20 | Stop reason: SDUPTHER

## 2022-09-20 RX ORDER — LIDOCAINE HYDROCHLORIDE 20 MG/ML
INJECTION, SOLUTION EPIDURAL; INFILTRATION; INTRACAUDAL; PERINEURAL PRN
Status: DISCONTINUED | OUTPATIENT
Start: 2022-09-20 | End: 2022-09-20 | Stop reason: SDUPTHER

## 2022-09-20 RX ORDER — ONDANSETRON 2 MG/ML
INJECTION INTRAMUSCULAR; INTRAVENOUS PRN
Status: DISCONTINUED | OUTPATIENT
Start: 2022-09-20 | End: 2022-09-20 | Stop reason: SDUPTHER

## 2022-09-20 RX ORDER — HYDROCODONE BITARTRATE AND ACETAMINOPHEN 5; 325 MG/1; MG/1
TABLET ORAL
Status: COMPLETED
Start: 2022-09-20 | End: 2022-09-20

## 2022-09-20 RX ORDER — METOCLOPRAMIDE HYDROCHLORIDE 5 MG/ML
INJECTION INTRAMUSCULAR; INTRAVENOUS PRN
Status: DISCONTINUED | OUTPATIENT
Start: 2022-09-20 | End: 2022-09-20 | Stop reason: SDUPTHER

## 2022-09-20 RX ORDER — SODIUM CHLORIDE 0.9 % (FLUSH) 0.9 %
5-40 SYRINGE (ML) INJECTION PRN
Status: DISCONTINUED | OUTPATIENT
Start: 2022-09-20 | End: 2022-09-20 | Stop reason: HOSPADM

## 2022-09-20 RX ORDER — DROPERIDOL 2.5 MG/ML
0.62 INJECTION, SOLUTION INTRAMUSCULAR; INTRAVENOUS
Status: DISCONTINUED | OUTPATIENT
Start: 2022-09-20 | End: 2022-09-20 | Stop reason: HOSPADM

## 2022-09-20 RX ORDER — SODIUM CHLORIDE, SODIUM LACTATE, POTASSIUM CHLORIDE, CALCIUM CHLORIDE 600; 310; 30; 20 MG/100ML; MG/100ML; MG/100ML; MG/100ML
INJECTION, SOLUTION INTRAVENOUS CONTINUOUS PRN
Status: DISCONTINUED | OUTPATIENT
Start: 2022-09-20 | End: 2022-09-20 | Stop reason: SDUPTHER

## 2022-09-20 RX ORDER — HYDROCODONE BITARTRATE AND ACETAMINOPHEN 5; 325 MG/1; MG/1
1 TABLET ORAL ONCE
Status: COMPLETED | OUTPATIENT
Start: 2022-09-20 | End: 2022-09-20

## 2022-09-20 RX ORDER — ALBUTEROL SULFATE 90 UG/1
AEROSOL, METERED RESPIRATORY (INHALATION) PRN
Status: DISCONTINUED | OUTPATIENT
Start: 2022-09-20 | End: 2022-09-20 | Stop reason: SDUPTHER

## 2022-09-20 RX ORDER — MEPERIDINE HYDROCHLORIDE 25 MG/ML
25 INJECTION INTRAMUSCULAR; INTRAVENOUS; SUBCUTANEOUS EVERY 5 MIN PRN
Status: DISCONTINUED | OUTPATIENT
Start: 2022-09-20 | End: 2022-09-20 | Stop reason: HOSPADM

## 2022-09-20 RX ORDER — CLINDAMYCIN PHOSPHATE 900 MG/50ML
900 INJECTION INTRAVENOUS
Status: COMPLETED | OUTPATIENT
Start: 2022-09-20 | End: 2022-09-20

## 2022-09-20 RX ADMIN — ALBUTEROL SULFATE 4 PUFF: 90 AEROSOL, METERED RESPIRATORY (INHALATION) at 09:33

## 2022-09-20 RX ADMIN — METOCLOPRAMIDE 10 MG: 5 INJECTION, SOLUTION INTRAMUSCULAR; INTRAVENOUS at 09:42

## 2022-09-20 RX ADMIN — FENTANYL CITRATE 50 MCG: 50 INJECTION, SOLUTION INTRAMUSCULAR; INTRAVENOUS at 09:36

## 2022-09-20 RX ADMIN — SODIUM CHLORIDE, POTASSIUM CHLORIDE, SODIUM LACTATE AND CALCIUM CHLORIDE: 600; 310; 30; 20 INJECTION, SOLUTION INTRAVENOUS at 09:02

## 2022-09-20 RX ADMIN — ONDANSETRON 4 MG: 2 INJECTION INTRAMUSCULAR; INTRAVENOUS at 09:42

## 2022-09-20 RX ADMIN — DEXAMETHASONE SODIUM PHOSPHATE 10 MG: 4 INJECTION, SOLUTION INTRAMUSCULAR; INTRAVENOUS at 09:40

## 2022-09-20 RX ADMIN — HYDROMORPHONE HYDROCHLORIDE 0.5 MG: 1 INJECTION, SOLUTION INTRAMUSCULAR; INTRAVENOUS; SUBCUTANEOUS at 10:28

## 2022-09-20 RX ADMIN — HYDROCODONE BITARTRATE AND ACETAMINOPHEN 1 TABLET: 5; 325 TABLET ORAL at 11:31

## 2022-09-20 RX ADMIN — ROCURONIUM BROMIDE 60 MG: 10 INJECTION INTRAVENOUS at 09:14

## 2022-09-20 RX ADMIN — Medication 3 MG: at 10:01

## 2022-09-20 RX ADMIN — LABETALOL HYDROCHLORIDE 5 MG: 5 INJECTION INTRAVENOUS at 09:38

## 2022-09-20 RX ADMIN — KETOROLAC TROMETHAMINE 30 MG: 30 INJECTION, SOLUTION INTRAMUSCULAR; INTRAVENOUS at 09:53

## 2022-09-20 RX ADMIN — CLINDAMYCIN PHOSPHATE 900 MG: 900 INJECTION, SOLUTION INTRAVENOUS at 09:11

## 2022-09-20 RX ADMIN — HYDROMORPHONE HYDROCHLORIDE 0.5 MG: 1 INJECTION, SOLUTION INTRAMUSCULAR; INTRAVENOUS; SUBCUTANEOUS at 10:34

## 2022-09-20 RX ADMIN — FENTANYL CITRATE 50 MCG: 50 INJECTION, SOLUTION INTRAMUSCULAR; INTRAVENOUS at 10:08

## 2022-09-20 RX ADMIN — PROPOFOL 200 MG: 10 INJECTION, EMULSION INTRAVENOUS at 09:11

## 2022-09-20 RX ADMIN — FENTANYL CITRATE 50 MCG: 50 INJECTION, SOLUTION INTRAMUSCULAR; INTRAVENOUS at 09:11

## 2022-09-20 RX ADMIN — LIDOCAINE HYDROCHLORIDE 100 MG: 20 INJECTION, SOLUTION EPIDURAL; INFILTRATION; INTRACAUDAL; PERINEURAL at 09:11

## 2022-09-20 RX ADMIN — FENTANYL CITRATE 50 MCG: 50 INJECTION, SOLUTION INTRAMUSCULAR; INTRAVENOUS at 09:24

## 2022-09-20 RX ADMIN — GLYCOPYRROLATE 0.6 MG: 0.2 INJECTION INTRAMUSCULAR; INTRAVENOUS at 10:01

## 2022-09-20 ASSESSMENT — PAIN DESCRIPTION - ONSET: ONSET: GRADUAL

## 2022-09-20 ASSESSMENT — PAIN SCALES - GENERAL
PAINLEVEL_OUTOF10: 6
PAINLEVEL_OUTOF10: 9
PAINLEVEL_OUTOF10: 6
PAINLEVEL_OUTOF10: 4
PAINLEVEL_OUTOF10: 6
PAINLEVEL_OUTOF10: 6

## 2022-09-20 ASSESSMENT — PAIN DESCRIPTION - DESCRIPTORS
DESCRIPTORS: DISCOMFORT
DESCRIPTORS: ACHING

## 2022-09-20 ASSESSMENT — ENCOUNTER SYMPTOMS: SHORTNESS OF BREATH: 1

## 2022-09-20 ASSESSMENT — PAIN DESCRIPTION - LOCATION
LOCATION: ABDOMEN

## 2022-09-20 ASSESSMENT — COPD QUESTIONNAIRES: CAT_SEVERITY: MODERATE

## 2022-09-20 ASSESSMENT — PAIN DESCRIPTION - PAIN TYPE
TYPE: SURGICAL PAIN

## 2022-09-20 ASSESSMENT — PAIN DESCRIPTION - ORIENTATION
ORIENTATION: MID
ORIENTATION: MID

## 2022-09-20 ASSESSMENT — LIFESTYLE VARIABLES: SMOKING_STATUS: 0

## 2022-09-20 ASSESSMENT — PAIN - FUNCTIONAL ASSESSMENT: PAIN_FUNCTIONAL_ASSESSMENT: 0-10

## 2022-09-20 NOTE — PROGRESS NOTES
Dr. Heavenly Sheffield updated regarding elevated /86; states no need to treat since less than pre-procedure BP.

## 2022-09-20 NOTE — DISCHARGE INSTRUCTIONS
Follow all instructions carefully:      Restrictions: Do not drive a car or operate machinery while taking narcotic pain medication    Diet:    regular       Medications:   Current Discharge Medication List             Details   glipiZIDE (GLUCOTROL) 5 MG tablet Take 2 mg by mouth 2 times daily (before meals)      Roflumilast (DALIRESP) 500 MCG tablet Take 500 mcg by mouth at bedtime      HYDROcodone-acetaminophen (NORCO) 5-325 MG per tablet Take 1 tablet by mouth three times daily. Budeson-Glycopyrrol-Formoterol (BREZTRI AEROSPHERE) 160-9-4.8 MCG/ACT AERO Inhale 2 puffs into the lungs 2 times daily      ipratropium-albuterol (DUONEB) 0.5-2.5 (3) MG/3ML SOLN nebulizer solution Inhale 3 mLs into the lungs 2 times daily      LORazepam (ATIVAN) 0.5 MG tablet Take 0.5 mg by mouth 3 times daily. spironolactone (ALDACTONE) 25 MG tablet Take 12.5 mg by mouth daily      ammonium lactate (LAC-HYDRIN) 12 % lotion Apply topically daily.   Qty: 222 mL, Refills: 5    Associated Diagnoses: Type II diabetes mellitus with peripheral circulatory disorder (HCC)      albuterol sulfate HFA (PROVENTIL;VENTOLIN;PROAIR) 108 (90 Base) MCG/ACT inhaler Inhale 2 puffs into the lungs every 6 hours as needed for Wheezing      omeprazole (PRILOSEC) 40 MG delayed release capsule Take 40 mg by mouth at bedtime      OXYGEN Inhale 2 L into the lungs nightly as needed      phenelzine (NARDIL) 15 MG tablet Take 1 tablet by mouth 3 times daily  Qty: 90 tablet, Refills: 3      benazepril (LOTENSIN) 10 MG tablet Take 1 tablet by mouth nightly  Qty: 30 tablet, Refills: 3      vitamin B-12 (CYANOCOBALAMIN) 500 MCG tablet Take 500 mcg by mouth daily      promethazine (PHENERGAN) 25 MG tablet Take 25 mg by mouth every 8 hours as needed       torsemide (DEMADEX) 20 MG tablet Take 20 mg by mouth as needed       Polyethylene Glycol 3350 (MIRALAX PO) Take by mouth as needed       gabapentin (NEURONTIN) 300 MG capsule Take 300 mg by mouth nightly Cholecalciferol (VITAMIN D-3 PO) Take 2,000 Units by mouth nightly       Melatonin 10 MG TABS Take by mouth nightly                 May resume all home medications    OK to shower but no tub bathing or swimming    No lifting objects > 15 lbs for 2  weeks         Follow up Care: Follow up with Dr Yadira Jordan in 2 weeks  [address] [phone]       If problems or questions arise call the physician, If unable to reach your physician call [facility] Emergency Room.     [facility] [phone] ([phone] [facility] [phone]

## 2022-09-20 NOTE — INTERVAL H&P NOTE
Update History & Physical    The patient's History and Physical of September 12, 2022 was reviewed with the patient and I examined the patient. There was no change. The surgical site was confirmed by the patient and me. Plan: The risks, benefits, expected outcome, and alternative to the recommended procedure have been discussed with the patient. Patient understands and wants to proceed with the procedure.      Electronically signed by Eleanor Rod MD on 9/20/2022 at 8:32 AM

## 2022-09-20 NOTE — ANESTHESIA POSTPROCEDURE EVALUATION
Department of Anesthesiology  Postprocedure Note    Patient: Antonia Quiros  MRN: [id number]  YOB: 1950  Date of evaluation: 9/20/2022      Procedure Summary     Date: 09/20/22 Room / Location: [facility] OR 07 / [facility]    Anesthesia Start: 0122 Anesthesia Stop: 1014    Procedures:       [address] (Abdomen)      EXCISION LEFT UPPER ARM LIPOMA (Left) Diagnosis:       Biliary dyskinesia      Lipoma of left upper extremity      (Biliary dyskinesia [K82.8])      (Lipoma of left upper extremity [D17.22])    Surgeons: Timothy Rhodes MD Responsible Provider: Hoda Gonzalez MD    Anesthesia Type: general ASA Status: 3          Anesthesia Type: No value filed.     Tasia Phase I: Tasia Score: 9    Tasia Phase II:        Anesthesia Post Evaluation    Patient location during evaluation: PACU  Patient participation: complete - patient participated  Level of consciousness: awake  Pain score: 3  Airway patency: patent  Nausea & Vomiting: no nausea  Complications: no  Cardiovascular status: blood pressure returned to baseline  Respiratory status: acceptable  Hydration status: euvolemic

## 2022-09-20 NOTE — ANESTHESIA PRE PROCEDURE
Department of Anesthesiology  Preprocedure Note       Name:  Alexander Palmer   Age:  67 y.o.  :  1950                                          MRN:  [id number]         Date:  2022      Surgeon: Mary Kate Urias):  Dolly Simon MD    Procedure: Procedure(s):  LAPAROSCOPIC CHOLECYSTECTOMY POSSIBLE OPEN POSSIBLE GRAM  EXCISION LEFT UPPER ARM LIPOMA    Medications prior to admission:   Prior to Admission medications    Medication Sig Start Date End Date Taking? Authorizing Provider   glipiZIDE (GLUCOTROL) 5 MG tablet Take 2 mg by mouth 2 times daily (before meals)   Yes Historical Provider, MD   Roflumilast (DALIRESP) 500 MCG tablet Take 500 mcg by mouth at bedtime   Yes Historical Provider, MD   HYDROcodone-acetaminophen (NORCO) 5-325 MG per tablet Take 1 tablet by mouth three times daily. Yes Historical Provider, MD   Budeson-Glycopyrrol-Formoterol (BREZTRI AEROSPHERE) 160-9-4.8 MCG/ACT AERO Inhale 2 puffs into the lungs 2 times daily 11/10/21   Historical Provider, MD   ipratropium-albuterol (DUONEB) 0.5-2.5 (3) MG/3ML SOLN nebulizer solution Inhale 3 mLs into the lungs 2 times daily 21   Historical Provider, MD   LORazepam (ATIVAN) 0.5 MG tablet Take 0.5 mg by mouth 3 times daily. 11/10/21   Historical Provider, MD   spironolactone (ALDACTONE) 25 MG tablet Take 12.5 mg by mouth daily 12/3/21   Historical Provider, MD   ammonium lactate (LAC-HYDRIN) 12 % lotion Apply topically daily.   Patient not taking: Reported on 2022 10/27/21   Ethan Hernandez DPM   albuterol sulfate HFA (PROVENTIL;VENTOLIN;PROAIR) 108 (90 Base) MCG/ACT inhaler Inhale 2 puffs into the lungs every 6 hours as needed for Wheezing    Historical Provider, MD   omeprazole (PRILOSEC) 40 MG delayed release capsule Take 40 mg by mouth at bedtime    Historical Provider, MD   OXYGEN Inhale 2 L into the lungs nightly as needed    Historical Provider, MD   phenelzine (NARDIL) 15 MG tablet Take 1 tablet by mouth 3 times daily 18 Kelly Carrion DO   benazepril (LOTENSIN) 10 MG tablet Take 1 tablet by mouth nightly  Patient not taking: Reported on 9/14/2022 8/8/18   Kelly Carrion DO   vitamin B-12 (CYANOCOBALAMIN) 500 MCG tablet Take 500 mcg by mouth daily    Historical Provider, MD   promethazine (PHENERGAN) 25 MG tablet Take 25 mg by mouth every 8 hours as needed     Historical Provider, MD   torsemide (DEMADEX) 20 MG tablet Take 20 mg by mouth as needed  5/16/17   Historical Provider, MD   Polyethylene Glycol 3350 (MIRALAX PO) Take by mouth as needed   Patient not taking: Reported on 9/14/2022    Historical Provider, MD   gabapentin (NEURONTIN) 300 MG capsule Take 300 mg by mouth nightly     Historical Provider, MD   Cholecalciferol (VITAMIN D-3 PO) Take 2,000 Units by mouth nightly     Historical Provider, MD   Melatonin 10 MG TABS Take by mouth nightly     Historical Provider, MD       Current medications:    Current Facility-Administered Medications   Medication Dose Route Frequency Provider Last Rate Last Admin    sodium chloride flush 0.9 % injection 5-40 mL  5-40 mL IntraVENous 2 times per day Meredith Leiva MD        sodium chloride flush 0.9 % injection 5-40 mL  5-40 mL IntraVENous PRN Meredith Leiva MD        lactated ringers infusion   IntraVENous Continuous Meredith Leiva MD        clindamycin (CLEOCIN) 900 mg in dextrose 5 % 50 mL IVPB  900 mg IntraVENous On Call to [address] [place] [address] MD Jaden           Allergies: Allergies   Allergen Reactions    Penicillins Anaphylaxis and Shortness Of Breath    Sulfa Antibiotics Itching and Swelling       Problem List:    Patient Active Problem List   Diagnosis Code    HTN (hypertension), benign I10    Diabetes mellitus type 2, controlled ([facility] [address] E11.9    Vertebral compression fracture (HCC) M48.50XA    SSS (sick sinus syndrome) ([facility]) I49.5    COPD (chronic obstructive pulmonary disease) ([facility] [address] J44.9    Depression F32. A    OA (osteoarthritis) M19.90    Obesity (BMI 30.0-34. 9) E66.9    Pacemaker Z95.0    Dyspnea R06.00    Chest pain of uncertain etiology F38.1    Near syncope R55    Orthostatic hypotension I95.1    Onychomycosis B35.1    PVD (peripheral vascular disease) ([facility]) I73.9    Type II diabetes mellitus with peripheral circulatory disorder ([facility]) E11.51    Pain of toe of right foot M79.674    Pain of toe of left foot M79.675    Difficulty walking R26.2    Contusion of right great toe without damage to nail S90.111A    Centrilobular emphysema ([facility]) J43.2    Chronic respiratory failure ([facility]) J96.10    Daytime somnolence R40.0    Pain in limb M79.609    Peripheral vascular disorder due to diabetes mellitus ([facility]) E11.51    Posterior rhinorrhea J34.89    Sleep disturbance G47.9       Past Medical History:        Diagnosis Date    Chronic pain     COPD (chronic obstructive pulmonary disease) ([facility] [address] [facility]     Depression     Diabetes ([facility] [address] [facility]     History of left shoulder fracture     Hypertension     Neuropathy     PONV (postoperative nausea and vomiting)     Sinus arrest     SSS (sick sinus syndrome) ([facility] [address] [facility]     Syncope and collapse        Past Surgical History:        Procedure Laterality Date    APPENDECTOMY      CARDIAC PACEMAKER PLACEMENT  2016     SECTION      x3    HYSTERECTOMY (CERVIX STATUS UNKNOWN)      LEG SURGERY Left 2021    LIPOMA RESECTION  2021    TONSILLECTOMY      TUBAL LIGATION         Social History:    Social History     Tobacco Use    Smoking status: Former     Packs/day: 1.00     Types: Cigarettes     Start date: 1963     Quit date: 2006     Years since quittin.1    Smokeless tobacco: Never   Substance Use Topics    Alcohol use: Not Currently                                Counseling given: Not Answered      Vital Signs (Current):   Vitals:    22 0739   Pulse: 95   Resp: 20   Temp: 36.7 °C (98.1 °F)   TempSrc: Temporal   SpO2: 98%   Weight: 200 lb (90.7 kg)   Height: 5' 8.5\" (1.74 m) BP Readings from Last 3 Encounters:   02/15/22 130/70   22 120/70   20 124/72       NPO Status:  22 @2300                                                                               BMI:   Wt Readings from Last 3 Encounters:   22 200 lb (90.7 kg)   22 207 lb (93.9 kg)   22 207 lb (93.9 kg)     Body mass index is 29.97 kg/m². CBC:   Lab Results   Component Value Date/Time    WBC 9.3 2018 03:45 AM    RBC 3.78 2018 03:45 AM    HGB 11.3 2018 03:45 AM    HCT 34.8 2018 03:45 AM    MCV 92.1 2018 03:45 AM    RDW 12.3 2018 03:45 AM     2018 03:45 AM       CMP:   Lab Results   Component Value Date/Time     2018 03:45 AM    K 4.1 2018 03:45 AM     2018 03:45 AM    CO2 24 2018 03:45 AM    BUN 11 2018 03:45 AM    CREATININE 0.7 2018 03:45 AM    GFRAA >60 2018 03:45 AM    LABGLOM >60 2018 03:45 AM    GLUCOSE 145 2018 03:45 AM    PROT 6.0 2018 03:45 AM    CALCIUM 8.9 2018 03:45 AM    BILITOT 0.4 2018 03:45 AM    ALKPHOS 82 2018 03:45 AM    AST 13 2018 03:45 AM    ALT 14 2018 03:45 AM       POC Tests: No results for input(s): POCGLU, POCNA, POCK, POCCL, POCBUN, POCHEMO, POCHCT in the last 72 hours.     Coags:   Lab Results   Component Value Date/Time    PROTIME 12.4 2016 05:30 AM    INR 1.2 2016 05:30 AM    APTT 30.0 2016 07:05 AM       HCG (If Applicable): No results found for: PREGTESTUR, PREGSERUM, HCG, HCGQUANT     ABGs: No results found for: PHART, PO2ART, QZD3NQH, KJZ8ACU, BEART, F9KXSOCO     Type & Screen (If Applicable):  No results found for: LABABO, LABRH    Drug/Infectious Status (If Applicable):  No results found for: HIV, HEPCAB    COVID-19 Screening (If Applicable): No results found for: COVID19      EK22:   Ventricular Rate BPM 72  81  73     Atrial Rate BPM 72  81  73     P-R Interval ms 156  134  148  134    QRS Duration ms 74  82  76     Q-T Interval ms 394  364  394  410    QTc Calculation (Bazett) ms 431  422  434     P Axis degrees 69  41  61  48 R    R Axis degrees 27  26  7     T Axis degrees 52  43  45  103 R    Resulting Agency  [facility]           Narrative & Impression    Normal sinus rhythm  Normal ECG  When compared with ECG of 08-AUG-2016 18:38,  No significant change was found  Confirmed by Melony Mcclellan [phone]) on 8/8/2018 11:40:05 AM             ECHO 06/03/21: Findings      Left Ventricle   Normal left ventricle size and systolic function. Ejection fraction is visually estimated at 60-65%. No regional wall motion abnormalities seen. Normal left ventricle wall thickness. Indeterminate diastolic function. Right Ventricle   Normal right ventricular size and function. TAPSE 23 mm. Pacer wire visualized in right ventricle. Left Atrium   The left atrium is mildly dilated. Right Atrium   Normal right atrium size. Mitral Valve   Mild mitral annular calcification. No evidence of mitral valve stenosis. Physiologic and/or trace mitral regurgitation is present. Tricuspid Valve   The tricuspid valve appears structurally normal.   Physiologic and/or trace tricuspid regurgitation. RVSP is 34 mmHg. Normal estimated PA systolic pressure. Aortic Valve   Individual aortic valve leaflets are not clearly visualized. No hemodynamically significant aortic stenosis is present. No evidence of aortic valve regurgitation. Pulmonic Valve   The pulmonic valve was not well visualized. No evidence of any pulmonic regurgitation. No evidence of pulmonic valve stenosis. Pericardial Effusion   No evidence for hemodynamically significant pericardial effusion. Pleural Effusion   No evidence of pleural effusion. Aorta   Normal aortic root and ascending aorta.    Miscellaneous   The inferior vena cava diameter is normal with normal respiratory   variation. Conclusions      Summary   Normal left ventricle size and systolic function. Ejection fraction is visually estimated at 60-65%. No regional wall motion abnormalities seen. Normal left ventricle wall thickness. Indeterminate diastolic function. The left atrium is mildly dilated. Normal right ventricular size and function. TAPSE 23 mm   Physiologic and/or trace mitral regurgitation is present. No hemodynamically significant aortic stenosis is present. Physiologic and/or trace tricuspid regurgitation. RVSP is 34 mmHg. Normal estimated PA systolic pressure. No evidence for hemodynamically significant pericardial effusion. Anesthesia Evaluation  Patient summary reviewed and Nursing notes reviewed   history of anesthetic complications: PONV. Airway: Mallampati: III  TM distance: >3 FB   Neck ROM: full  Mouth opening: > = 3 FB   Dental:    (+) partials  Comment: Upper partial removed     Pulmonary:   (+) COPD: moderate,  shortness of breath:  decreased breath sounds     (-) not a current smoker ( former)          Patient did not smoke on day of surgery. Cardiovascular:  Exercise tolerance: poor (<4 METS),   (+) hypertension:, pacemaker: pacemaker, CHF:, RODRIGUEZ:, hyperlipidemia      ECG reviewed  Rhythm: regular  Rate: normal  Echocardiogram reviewed         Beta Blocker:  Not on Beta Blocker      ROS comment: SSS (sick sinus syndrome)   Orthostatic hypotension     Neuro/Psych:   (+) psychiatric history:depression/anxiety              ROS comment: Syncope and collapse  Vertebral compression fracture   Chronic Pain  GI/Hepatic/Renal:   (+) GERD:,           Endo/Other:    (+) DiabetesType II DM, , : arthritis: OA., . Pt had no PAT visit       Abdominal:   (+) obese,           Vascular:   + PVD, aortic or cerebral ( pvd), .        Other Findings:           Anesthesia Plan      general     ASA 3       Induction: intravenous. MIPS: Postoperative opioids intended, Prophylactic antiemetics administered and Postoperative trial extubation. Anesthetic plan and risks discussed with patient. Use of blood products discussed with patient whom consented to blood products. Plan discussed with attending. Carmen Davidson RN   9/20/2022      Pt seen questions answered plan outlined H&P reviewed accepts general. Dominguez Rees M.D 09/20/2022.0822.

## 2022-09-20 NOTE — OP NOTE
Operative Note      Patient: Otilio Cockayne  YOB: 1950  MRN: [id number]    Date of Procedure: 9/20/2022    Pre-Op Diagnosis: Biliary dyskinesia [K82.8]  Lipoma of left upper extremity [D17.22]    Post-Op Diagnosis: Same       Procedure(s):  LAPAROSCOPIC CHOLECYSTECTOMY POSSIBLE OPEN POSSIBLE GRAM  EXCISION LEFT UPPER ARM LIPOMA    Surgeon(s):  Sheldon Hirsch MD    Assistant:   Resident: Simon Garcia MD    Anesthesia: General    Estimated Blood Loss (mL): 5    Complications: None    Specimens:   ID Type Source Tests Collected by Time Destination   A : GALLBLADDER Tissue Gallbladder SURGICAL PATHOLOGY Sheldon Hirsch MD 9/20/2022 0068    B : LEFT UPPER EXTREMITY LIPOMA Tissue Tissue SURGICAL PATHOLOGY Sheldon Hirsch MD 9/20/2022 2291        Implants:  * No implants in log *      Drains: * No LDAs found *    Findings: Biliary dyskinesia, left upper extremity lipoma      Procedure: Patient was taken to the operating room and placed on the operating table in a supine position. General anesthesia was administered. The abdomen was prepped and draped in usual sterile fashion. A 5 mm incision was made above the umbilicus with an 11 blade. The abdominal wall was tented upward. A varies needle was introduced. Placement was confirmed with a drip test.  The abdomen was insufflated to 15 mmHg. The varies needle was removed. A 5 mm trocar was inserted and connected to the insufflation line. A 5 mm 30 degree laparoscope was inserted. The patient was placed in reverse Trendelenburg and rotated left side down. A 12 mm port was placed in the epigastrium under direct vision. A 5 mm port was placed in the right upper quadrant under direct vision. The gallbladder was visualized. It was grasped with the atraumatic grasper and retracted superiorly and laterally. I then used a [place] to pull down peritoneal attachments at the neck of the gallbladder. The cystic duct was easily exposed.   I then dissected the cystic duct inferiorly and posteriorly, and then anteriorly and superiorly until a window was created behind it. The cystic artery was seen in its expected position. It was dissected out in a likewise fashion until a window was created behind it. The hook cautery was used to separate and divide peritoneal attachments at the lateral aspect of the gallbladder inferiorly, and then medially along the edge of the liver. At this point, the critical view of safety was obtained. A medium large clip applier was inserted. The cystic duct was clipped and then divided with scissors. The cystic artery was clipped and then divided with scissors. The peritoneal attachments between the gallbladder and the liver were then divided with the hook cautery device. The gallbladder was  from the liver with good hemostasis. It was grasped with atraumatic graspers, a needle was used to decompress the gallbladder and then extruded out the epigastric port site and sent for specimen. The right upper quadrant was then irrigated and dried. The gallbladder fossa was inspected. Small bleeding points were cauterized with the hook cautery. A piece of surgicel snow was placed in the gallbladder fossa  The patient was placed in a level position. The 12 mm trocar was removed and a Trace-Segundo needle was used to close the fascia with an 0 Vicryl suture. The abdomen was deflated and the remaining ports were removed. Local anesthetic was injected. Skin incisions were closed with 4-0 Vicryl sutures and Dermabond. Attention was then turned to the left upper extremity. A palpable mass was marked preoperatively and a 2 cm incision was made over this mass. We dissected blunted with a hemostat and a lipomatous mass was expressed from the incision in piece meal fashion, approximately 2 cm in size and passed off for pathology. This was then closed in two layers with 3-0 and 4-0 vicryl sutures and Dermabond. The patient was awakened from anesthesia and taken to recovery. Dr. Huber Staples was present and scrubbed for the procedure.      Electronically signed by Ever Thao MD on 9/20/2022 at 11:12 AM

## 2022-10-25 ENCOUNTER — PREP FOR PROCEDURE (OUTPATIENT)
Dept: SURGERY | Age: 72
End: 2022-10-25

## 2022-10-25 RX ORDER — SODIUM CHLORIDE 9 MG/ML
INJECTION, SOLUTION INTRAVENOUS CONTINUOUS
Status: CANCELLED | OUTPATIENT
Start: 2022-10-25

## 2022-10-25 RX ORDER — SODIUM CHLORIDE 0.9 % (FLUSH) 0.9 %
5-40 SYRINGE (ML) INJECTION PRN
Status: CANCELLED | OUTPATIENT
Start: 2022-10-25

## 2022-10-25 RX ORDER — ENOXAPARIN SODIUM 100 MG/ML
40 INJECTION SUBCUTANEOUS
Status: CANCELLED | OUTPATIENT
Start: 2022-10-25 | End: 2022-10-25

## 2022-10-25 RX ORDER — SODIUM CHLORIDE 0.9 % (FLUSH) 0.9 %
5-40 SYRINGE (ML) INJECTION EVERY 12 HOURS SCHEDULED
Status: CANCELLED | OUTPATIENT
Start: 2022-10-25

## 2022-10-25 NOTE — H&P (VIEW-ONLY)
Date:   10/03/22[facility]  Name: Heron Lisa             : 1950 Sex: F  Age: 67 yrs  Acct#:  [id number]          Patient was referred by Montrell Angel M.D..  Patient's primary care provider is Montrell Angel M.D..  CC:  postop    HPI: Status post excision of left arm lipoma as well as laparoscopic cholecystectomy. Doing well. Reviewed the pathology report. Reviewed activity and dietary recommendations. Still having significant difficulties moving her bowels. She is hopeful to have a sigmoid resection, now that she has demonstrated her ability to tolerate anesthesia without any pulmonary issues. Meds Prior to Visit:  Zofran  4 mg  1 by mouth every 6 hours as needed  Ibuprofen  800 mg  1 tab by mouth every 6 hours as needed for pain  Daliresp  500 mcg  once daily  Melatonin  10 mg   Omeprazole  40 mg  take 1 capsule by mouth every day  Phenergan  12.5 mg   Gabapentin  100 mg   Torsemide  5 mg   Vitamin D  2000 Unit   Glimepiride     Hydrocodone Bitartrate/Acetaminophen     Promethazine HCL     Lorazepam     B12     Zinc     Linzess  72 mcg  one tablet every morning by mouth     Allergies:  Penicillins        Wt Prior: 210lb as of 19    Exam:  Constitution:  Non-toxic, no acute distress  Heart :  RRR  Lungs CTA bilaterally  Abdomen: Soft and nondistended, well-healed incisions  Extremeties: No rash, cyanosis, or jaundice         Assessment #1: Hx K82.8 Other specified diseases of gallbladder   Care Plan:                Assessment #2: Hx D17.22 Benign 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   Discussed risks and benefits, complications;           Time spent reviewing records, discussing findings, answering questions, reviewing laboratory studies, radiologic exams, and other diagnostics, and reviewing the diagnostic and therapeutic plan: [ ] minutes    Voice recognition software was used in the preparation of this document. Despite all efforts to prevent them, transcription errors may have occurred.   Seen by:

## 2022-10-25 NOTE — PROGRESS NOTES
May use both cardiac and pulmonary clearance( from [facility])  from previous surgery in October for DOS 11/1/2022  per Dr. Kaleb Martin

## 2022-10-25 NOTE — H&P
Date:   10/03/22[facility]  Name: Frankie Strange             : 1950 Sex: F  Age: 67 yrs  Acct#:  [id number]          Patient was referred by Nayana Mendoza M.D..  Patient's primary care provider is Nayana Mendoza M.D..  CC:  postop    HPI: Status post excision of left arm lipoma as well as laparoscopic cholecystectomy. Doing well. Reviewed the pathology report. Reviewed activity and dietary recommendations. Still having significant difficulties moving her bowels. She is hopeful to have a sigmoid resection, now that she has demonstrated her ability to tolerate anesthesia without any pulmonary issues. Meds Prior to Visit:  Zofran  4 mg  1 by mouth every 6 hours as needed  Ibuprofen  800 mg  1 tab by mouth every 6 hours as needed for pain  Daliresp  500 mcg  once daily  Melatonin  10 mg   Omeprazole  40 mg  take 1 capsule by mouth every day  Phenergan  12.5 mg   Gabapentin  100 mg   Torsemide  5 mg   Vitamin D  2000 Unit   Glimepiride     Hydrocodone Bitartrate/Acetaminophen     Promethazine HCL     Lorazepam     B12     Zinc     Linzess  72 mcg  one tablet every morning by mouth     Allergies:  Penicillins        Wt Prior: 210lb as of 19    Exam:  Constitution:  Non-toxic, no acute distress  Heart :  RRR  Lungs CTA bilaterally  Abdomen: Soft and nondistended, well-healed incisions  Extremeties: No rash, cyanosis, or jaundice         Assessment #1: Hx K82.8 Other specified diseases of gallbladder   Care Plan:                Assessment #2: Hx D17.22 Benign 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   Discussed risks and benefits, complications;           Time spent reviewing records, discussing findings, answering questions, reviewing laboratory studies, radiologic exams, and other diagnostics, and reviewing the diagnostic and therapeutic plan: [ ] minutes    Voice recognition software was used in the preparation of this document. Despite all efforts to prevent them, transcription errors may have occurred.   Seen by:

## 2022-10-27 ENCOUNTER — HOSPITAL ENCOUNTER (OUTPATIENT)
Dept: PREADMISSION TESTING | Age: 72
Discharge: HOME | End: 2022-10-27
Payer: MEDICARE

## 2022-10-27 VITALS
SYSTOLIC BLOOD PRESSURE: 143 MMHG | HEART RATE: 88 BPM | TEMPERATURE: 97.3 F | BODY MASS INDEX: 30.31 KG/M2 | DIASTOLIC BLOOD PRESSURE: 63 MMHG | RESPIRATION RATE: 20 BRPM | WEIGHT: 200 LBS | HEIGHT: 68 IN

## 2022-10-27 LAB
ABO/RH: NORMAL
ANION GAP SERPL CALCULATED.3IONS-SCNC: 13 MMOL/L (ref 7–16)
ANTIBODY SCREEN: NORMAL
BUN BLDV-MCNC: 40 MG/DL (ref 6–23)
CALCIUM SERPL-MCNC: 10.1 MG/DL (ref 8.6–10.2)
CHLORIDE BLD-SCNC: 93 MMOL/L (ref 98–107)
CO2: 30 MMOL/L (ref 22–29)
CREAT SERPL-MCNC: 1.7 MG/DL (ref 0.5–1)
GFR SERPL CREATININE-BSD FRML MDRD: 32 ML/MIN/1.73
GLUCOSE BLD-MCNC: 180 MG/DL (ref 74–99)
HCT VFR BLD CALC: 40.4 % (ref 34–48)
HEMOGLOBIN: 13.1 G/DL (ref 11.5–15.5)
MCH RBC QN AUTO: 28.9 PG (ref 26–35)
MCHC RBC AUTO-ENTMCNC: 32.4 % (ref 32–34.5)
MCV RBC AUTO: 89 FL (ref 80–99.9)
PDW BLD-RTO: 12.6 FL (ref 11.5–15)
PLATELET # BLD: 265 E9/L (ref 130–450)
PMV BLD AUTO: 9.6 FL (ref 7–12)
POTASSIUM SERPL-SCNC: 3.7 MMOL/L (ref 3.5–5)
RBC # BLD: 4.54 E12/L (ref 3.5–5.5)
SODIUM BLD-SCNC: 136 MMOL/L (ref 132–146)
WBC # BLD: 13 E9/L (ref 4.5–11.5)

## 2022-10-27 PROCEDURE — 85027 COMPLETE CBC AUTOMATED: CPT

## 2022-10-27 PROCEDURE — 86850 RBC ANTIBODY SCREEN: CPT

## 2022-10-27 PROCEDURE — 36415 COLL VENOUS BLD VENIPUNCTURE: CPT

## 2022-10-27 PROCEDURE — 80048 BASIC METABOLIC PNL TOTAL CA: CPT

## 2022-10-27 PROCEDURE — 87081 CULTURE SCREEN ONLY: CPT

## 2022-10-27 PROCEDURE — 86900 BLOOD TYPING SEROLOGIC ABO: CPT

## 2022-10-27 PROCEDURE — 86901 BLOOD TYPING SEROLOGIC RH(D): CPT

## 2022-10-27 ASSESSMENT — PAIN - FUNCTIONAL ASSESSMENT: PAIN_FUNCTIONAL_ASSESSMENT: PREVENTS OR INTERFERES SOME ACTIVE ACTIVITIES AND ADLS

## 2022-10-27 ASSESSMENT — PAIN DESCRIPTION - LOCATION: LOCATION: BACK;ABDOMEN

## 2022-10-27 ASSESSMENT — PAIN SCALES - GENERAL: PAINLEVEL_OUTOF10: 7

## 2022-10-27 ASSESSMENT — PAIN DESCRIPTION - PAIN TYPE: TYPE: CHRONIC PAIN

## 2022-10-27 NOTE — PROGRESS NOTES
Patient allowed her blood to be drawn and blood bank bracelet to be placed on her left arm for potential blood transfusion. However, she stated she would not receive blood from anyone who has received any Covid vaccine. Verification was made with Margaret Mcqueen in the blood bank that blood donors are not screened by Covid vaccination status. Patient then stated she would not accept blood for any reason - up to and including to save her life. Discussion took place regarding the surgery she is having does have the potential for blood loss. This did not sway her decision but she did agree to keep the bracelet on until she speaks to Dr Emerita Rey or a member of his staff. Spoke to [facility] by phone at Dr Roxie Arshad office and informed of the above. She will follow up with Dr Emerita Rey and patient.

## 2022-10-27 NOTE — PROGRESS NOTES
[facility] PRE-ADMISSION TESTING INSTRUCTIONS    The Preadmission Testing patient is instructed accordingly using the following criteria (check applicable):    ARRIVAL INSTRUCTIONS:  [x] Parking the day of Surgery is located in the Main Entrance lot. Upon entering the door, make an immediate right to the surgery reception desk    [x] Bring photo ID and insurance card    [x] Bring your durable power of  document    [x] Please be sure to arrange for responsible adult to provide transportation to and from the hospital    [x] Please arrange for responsible adult to be with you for the 24 hour period post procedure due to having anesthesia    [x] If you awake am of surgery not feeling well or have temperature >100 please call [phone]    GENERAL INSTRUCTIONS:    [x] Nothing by mouth after midnight, including gum, candy, mints or water    [x] You may brush your teeth, but do not swallow any water    [x] Take medications as instructed with 1-2 oz of water:  Hydrocodone  Ativan  Phenelzine    [x] Stop herbal supplements and vitamins 5 days prior to procedure    [x] Follow preop dosing of blood thinners per physician instructions    [x] No oral diabetic medications after midnight    [x] If diabetic and have low blood sugar or feel symptomatic, take 1-2oz apple juice only    [x] Bring inhalers day of surgery: use all of your breathing medications as you normally do    [x] Follow bowel prep as instructed per surgeon    [x] No tobacco products within 24 hours of surgery     [x] No alcohol or illegal drug use within 24 hours of surgery.     [x] Jewelry, body piercing's, eyeglasses, contact lenses and dentures are not permitted into surgery (bring cases)      [x] Please do not wear any nail polish, make up or hair products on the day of surgery    [x] You can expect a call the business day prior to procedure to notify you if your arrival time changes    [x] If you receive a survey after surgery we would greatly appreciate your comments    [x] Please notify surgeon if you develop any illness between now and time of surgery (cold, cough, sore throat, fever, nausea, vomiting) or any signs of infections  including skin, wounds, and dental.    [x]  The Outpatient Pharmacy is available to fill your prescription here on your day of surgery, ask your preop nurse for details      EDUCATIONAL MATERIALS PROVIDED:    [x] PAT Preoperative Education Packet/Booklet     [x] Medication List    [x] Transfusion bracelet applied with instructions    [x] Shower with soap, lather and rinse well, and use CHG wipes provided the evening before surgery as instructed    [x] Incentive spirometer with instructions: bring it with you the day of surgery

## 2022-10-28 ENCOUNTER — PROCEDURE VISIT (OUTPATIENT)
Dept: PODIATRY | Age: 72
End: 2022-10-28
Payer: MEDICARE

## 2022-10-28 VITALS — HEIGHT: 68 IN | BODY MASS INDEX: 29.55 KG/M2 | WEIGHT: 195 LBS

## 2022-10-28 DIAGNOSIS — I87.2 VENOUS INSUFFICIENCY (CHRONIC) (PERIPHERAL): ICD-10-CM

## 2022-10-28 DIAGNOSIS — R26.2 DIFFICULTY WALKING: ICD-10-CM

## 2022-10-28 DIAGNOSIS — I73.9 PVD (PERIPHERAL VASCULAR DISEASE) (HCC): ICD-10-CM

## 2022-10-28 DIAGNOSIS — M79.674 PAIN OF TOE OF RIGHT FOOT: ICD-10-CM

## 2022-10-28 DIAGNOSIS — B35.1 ONYCHOMYCOSIS: Primary | ICD-10-CM

## 2022-10-28 DIAGNOSIS — E11.51 TYPE II DIABETES MELLITUS WITH PERIPHERAL CIRCULATORY DISORDER (HCC): ICD-10-CM

## 2022-10-28 PROCEDURE — 11721 DEBRIDE NAIL 6 OR MORE: CPT | Performed by: PODIATRIST

## 2022-10-28 NOTE — PROGRESS NOTES
Patient in today for nail care. Patient does not have any complaints of pain at this time.  Patient's PCP is Sarah Anton MD date of last ov 10/22          Lord Ava LPN

## 2022-10-29 LAB — MRSA CULTURE ONLY: NORMAL

## 2022-10-30 NOTE — PROGRESS NOTES
10/29/22     Sana Bonds    : 1950  Sex: female  Age: 67 y.o. Subjective: The patient is seen today for evaluation regarding diabetic foot evaluation and mycotic nail care. No other complaints noted. Chief Complaint   Patient presents with    Nail Problem     Routine nail care. Current Medications:    Current Outpatient Medications:     glipiZIDE (GLUCOTROL) 5 MG tablet, Take 2 mg by mouth 2 times daily (before meals), Disp: , Rfl:     Roflumilast (DALIRESP) 500 MCG tablet, Take 500 mcg by mouth at bedtime, Disp: , Rfl:     HYDROcodone-acetaminophen (NORCO) 5-325 MG per tablet, Take 1 tablet by mouth three times daily. , Disp: , Rfl:     Budeson-Glycopyrrol-Formoterol (BREZTRI AEROSPHERE) 160-9-4.8 MCG/ACT AERO, Inhale 2 puffs into the lungs 2 times daily, Disp: , Rfl:     ipratropium-albuterol (DUONEB) 0.5-2.5 (3) MG/3ML SOLN nebulizer solution, Inhale 3 mLs into the lungs 2 times daily, Disp: , Rfl:     LORazepam (ATIVAN) 0.5 MG tablet, Take 0.5 mg by mouth 3 times daily. , Disp: , Rfl:     spironolactone (ALDACTONE) 25 MG tablet, Take 25 mg by mouth daily, Disp: , Rfl:     ammonium lactate (LAC-HYDRIN) 12 % lotion, Apply topically daily. , Disp: 222 mL, Rfl: 5    albuterol sulfate HFA (PROVENTIL;VENTOLIN;PROAIR) 108 (90 Base) MCG/ACT inhaler, Inhale 2 puffs into the lungs every 6 hours as needed for Wheezing, Disp: , Rfl:     omeprazole (PRILOSEC) 40 MG delayed release capsule, Take 40 mg by mouth at bedtime, Disp: , Rfl:     OXYGEN, Inhale 2 L into the lungs nightly as needed, Disp: , Rfl:     phenelzine (NARDIL) 15 MG tablet, Take 1 tablet by mouth 3 times daily, Disp: 90 tablet, Rfl: 3    benazepril (LOTENSIN) 10 MG tablet, Take 1 tablet by mouth nightly, Disp: 30 tablet, Rfl: 3    vitamin B-12 (CYANOCOBALAMIN) 500 MCG tablet, Take 500 mcg by mouth daily, Disp: , Rfl:     promethazine (PHENERGAN) 25 MG tablet, Take 25 mg by mouth every 8 hours as needed , Disp: , Rfl:     torsemide (DEMADEX) 20 MG tablet, Take 20 mg by mouth daily, Disp: , Rfl:     Polyethylene Glycol 3350 (MIRALAX PO), Take by mouth as needed, Disp: , Rfl:     gabapentin (NEURONTIN) 300 MG capsule, Take 300 mg by mouth nightly , Disp: , Rfl:     Cholecalciferol (VITAMIN D-3 PO), Take 2,000 Units by mouth nightly , Disp: , Rfl:     Melatonin 10 MG TABS, Take by mouth nightly , Disp: , Rfl:     Allergies: Allergies   Allergen Reactions    Penicillins Anaphylaxis and Shortness Of Breath    Sulfa Antibiotics Itching and Swelling       Past Surgical History:   Procedure Laterality Date    APPENDECTOMY      ARM SURGERY Left 2022    EXCISION LEFT UPPER ARM LIPOMA performed by Kaleb Gooden MD at [address] [place] [address]  2016     SECTION      x3    CHOLECYSTECTOMY, LAPAROSCOPIC N/A 2022    LAPAROSCOPIC CHOLECYSTECTOMY POSSIBLE OPEN POSSIBLE GRAM performed by Kaleb Gooden MD at . \[facility]\"" [address] ([address] [place] [address])      LEG SURGERY Left 2021    LIPOMA RESECTION  2021    TONSILLECTOMY      TUBAL LIGATION       Past Medical History:   Diagnosis Date    Arthritis     Chronic pain     COPD (chronic obstructive pulmonary disease) ([address]     Depression     Diabetes ([address]     History of left shoulder fracture     Hx of blood clots     Patient reports \"blood clots in my feet\" about 18 months ago    Hypertension     Neuropathy     Pacemaker     last interrogation 22 Epic cardiology    PONV (postoperative nausea and vomiting)     Pulmonary hypertension ([address]     Sinus arrest     SSS (sick sinus syndrome) ([facility]     Syncope and collapse        Vitals:    10/28/22 1147   Weight: 195 lb (88.5 kg)   Height: 5' 8\" (1.727 m)       Exam:  Pedal pulses diminished to palpation bilateral foot. At this time the nail/s 1,2,3,4,5 right foot and nail/s 1,2,3,4,5 left foot are noted to be thickened, dystrophic and discolored with subungual debris present. Tenderness noted to palpation.   Minimal hair growth is noted to both lower extremities. Edema noted with both stasis skin changes noted bilaterally. Coolness is noted to the digital regions to palpation. Capillary fill time delayed digital areas bilateral foot. No heel fissuring or macerations of the web spaces. No plantar calluses and/or ulcerative areas are noted. Patient is having difficulty with gait/walking. Plan Per Assessment  Dane Connelly was seen today for nail problem. Diagnoses and all orders for this visit:    Onychomycosis    Pain of toe of right foot    PVD (peripheral vascular disease) ([facility]    Type II diabetes mellitus with peripheral circulatory disorder (HCC)    Venous insufficiency (chronic) (peripheral)    Difficulty walking        1. Evaluation and Management  2. Manual and electrical debridement of the mycotic nails was performed for thickness and length to prevent injection and/or ulceration. 3. Discussed additional diabetic lower extremity care techniques with patient today. 4. It was discussed in detail with the patient proper caring for the vascular compromised foot. The fact that they have compromised blood flow put the patient at risk for infection/gangrene/amputation. The patient should not walk barefoot. Shoe gear should fit properly and socks should be worn with shoes. If any skin lesions are noted, they are instructed to contact the office immediately. 5. We will see the patient back at a later date for continued podiatric management and care. Patient was advised to call the office with any questions or concerns prior to their next appointment if needed. Seen By:    Blanca Chung DPM    Electronically signed by Blanca Chung DPM on 10/29/2022 at 8:00 PM      This note was created using voice recognition software. The note was reviewed however may contain grammatical errors.

## 2022-10-31 ENCOUNTER — ANESTHESIA EVENT (OUTPATIENT)
Dept: OPERATING ROOM | Age: 72
DRG: 331 | End: 2022-10-31
Payer: MEDICARE

## 2022-11-01 ENCOUNTER — ANESTHESIA (OUTPATIENT)
Dept: OPERATING ROOM | Age: 72
DRG: 331 | End: 2022-11-01
Payer: MEDICARE

## 2022-11-01 ENCOUNTER — HOSPITAL ENCOUNTER (INPATIENT)
Age: 72
LOS: 3 days | Discharge: HOME | DRG: 331 | End: 2022-11-04
Attending: SURGERY | Admitting: SURGERY
Payer: MEDICARE

## 2022-11-01 DIAGNOSIS — K59.00 CONSTIPATION, UNSPECIFIED CONSTIPATION TYPE: ICD-10-CM

## 2022-11-01 DIAGNOSIS — K91.81 INTRAOPERATIVE PARTIAL INTESTINAL OBSTRUCTION (HCC): ICD-10-CM

## 2022-11-01 DIAGNOSIS — K56.690 OTHER PARTIAL INTESTINAL OBSTRUCTION (HCC): Primary | ICD-10-CM

## 2022-11-01 DIAGNOSIS — K56.690 INTRAOPERATIVE PARTIAL INTESTINAL OBSTRUCTION (HCC): ICD-10-CM

## 2022-11-01 LAB
METER GLUCOSE: 166 MG/DL (ref 74–99)
METER GLUCOSE: 176 MG/DL (ref 74–99)
METER GLUCOSE: 187 MG/DL (ref 74–99)
METER GLUCOSE: 200 MG/DL (ref 74–99)
METER GLUCOSE: 291 MG/DL (ref 74–99)

## 2022-11-01 PROCEDURE — 2700000000 HC OXYGEN THERAPY PER DAY

## 2022-11-01 PROCEDURE — 6360000002 HC RX W HCPCS: Performed by: SURGERY

## 2022-11-01 PROCEDURE — 6370000000 HC RX 637 (ALT 250 FOR IP): Performed by: ANESTHESIOLOGY

## 2022-11-01 PROCEDURE — 7100000000 HC PACU RECOVERY - FIRST 15 MIN: Performed by: SURGERY

## 2022-11-01 PROCEDURE — 6360000002 HC RX W HCPCS: Performed by: ANESTHESIOLOGY

## 2022-11-01 PROCEDURE — 3600000014 HC SURGERY LEVEL 4 ADDTL 15MIN: Performed by: SURGERY

## 2022-11-01 PROCEDURE — 94664 DEMO&/EVAL PT USE INHALER: CPT

## 2022-11-01 PROCEDURE — 2500000003 HC RX 250 WO HCPCS: Performed by: SURGERY

## 2022-11-01 PROCEDURE — 7100000001 HC PACU RECOVERY - ADDTL 15 MIN: Performed by: SURGERY

## 2022-11-01 PROCEDURE — 2580000003 HC RX 258: Performed by: SURGERY

## 2022-11-01 PROCEDURE — 3600000004 HC SURGERY LEVEL 4 BASE: Performed by: SURGERY

## 2022-11-01 PROCEDURE — 88307 TISSUE EXAM BY PATHOLOGIST: CPT

## 2022-11-01 PROCEDURE — 3700000001 HC ADD 15 MINUTES (ANESTHESIA): Performed by: SURGERY

## 2022-11-01 PROCEDURE — 82962 GLUCOSE BLOOD TEST: CPT

## 2022-11-01 PROCEDURE — 0DTN4ZZ RESECTION OF SIGMOID COLON, PERCUTANEOUS ENDOSCOPIC APPROACH: ICD-10-PCS | Performed by: SURGERY

## 2022-11-01 PROCEDURE — 2720000010 HC SURG SUPPLY STERILE: Performed by: SURGERY

## 2022-11-01 PROCEDURE — 2709999900 HC NON-CHARGEABLE SUPPLY: Performed by: SURGERY

## 2022-11-01 PROCEDURE — 1200000000 HC SEMI PRIVATE

## 2022-11-01 PROCEDURE — 6370000000 HC RX 637 (ALT 250 FOR IP): Performed by: SURGERY

## 2022-11-01 PROCEDURE — 3700000000 HC ANESTHESIA ATTENDED CARE: Performed by: SURGERY

## 2022-11-01 PROCEDURE — 6360000002 HC RX W HCPCS

## 2022-11-01 PROCEDURE — 2500000003 HC RX 250 WO HCPCS

## 2022-11-01 PROCEDURE — 94640 AIRWAY INHALATION TREATMENT: CPT

## 2022-11-01 RX ORDER — ENOXAPARIN SODIUM 100 MG/ML
40 INJECTION SUBCUTANEOUS
Status: COMPLETED | OUTPATIENT
Start: 2022-11-01 | End: 2022-11-01

## 2022-11-01 RX ORDER — LORAZEPAM 0.5 MG/1
0.5 TABLET ORAL 3 TIMES DAILY
Status: DISCONTINUED | OUTPATIENT
Start: 2022-11-01 | End: 2022-11-04 | Stop reason: HOSPADM

## 2022-11-01 RX ORDER — HYDROMORPHONE HCL 110MG/55ML
PATIENT CONTROLLED ANALGESIA SYRINGE INTRAVENOUS PRN
Status: DISCONTINUED | OUTPATIENT
Start: 2022-11-01 | End: 2022-11-01 | Stop reason: SDUPTHER

## 2022-11-01 RX ORDER — SODIUM CHLORIDE 0.9 % (FLUSH) 0.9 %
10 SYRINGE (ML) INJECTION PRN
Status: DISCONTINUED | OUTPATIENT
Start: 2022-11-01 | End: 2022-11-04 | Stop reason: HOSPADM

## 2022-11-01 RX ORDER — ONDANSETRON 4 MG/1
4 TABLET, ORALLY DISINTEGRATING ORAL EVERY 8 HOURS PRN
Status: DISCONTINUED | OUTPATIENT
Start: 2022-11-01 | End: 2022-11-04 | Stop reason: HOSPADM

## 2022-11-01 RX ORDER — HYDRALAZINE HYDROCHLORIDE 20 MG/ML
INJECTION INTRAMUSCULAR; INTRAVENOUS PRN
Status: DISCONTINUED | OUTPATIENT
Start: 2022-11-01 | End: 2022-11-01 | Stop reason: SDUPTHER

## 2022-11-01 RX ORDER — ARFORMOTEROL TARTRATE 15 UG/2ML
15 SOLUTION RESPIRATORY (INHALATION) 2 TIMES DAILY
Status: DISCONTINUED | OUTPATIENT
Start: 2022-11-01 | End: 2022-11-04 | Stop reason: HOSPADM

## 2022-11-01 RX ORDER — BUDESONIDE 0.25 MG/2ML
0.25 INHALANT ORAL 2 TIMES DAILY
Status: DISCONTINUED | OUTPATIENT
Start: 2022-11-01 | End: 2022-11-04 | Stop reason: HOSPADM

## 2022-11-01 RX ORDER — SCOLOPAMINE TRANSDERMAL SYSTEM 1 MG/1
1 PATCH, EXTENDED RELEASE TRANSDERMAL ONCE
Status: COMPLETED | OUTPATIENT
Start: 2022-11-01 | End: 2022-11-04

## 2022-11-01 RX ORDER — ALBUTEROL SULFATE 2.5 MG/3ML
2.5 SOLUTION RESPIRATORY (INHALATION) EVERY 6 HOURS PRN
Status: DISCONTINUED | OUTPATIENT
Start: 2022-11-01 | End: 2022-11-04 | Stop reason: HOSPADM

## 2022-11-01 RX ORDER — METRONIDAZOLE 500 MG/100ML
500 INJECTION, SOLUTION INTRAVENOUS
Status: COMPLETED | OUTPATIENT
Start: 2022-11-01 | End: 2022-11-01

## 2022-11-01 RX ORDER — SPIRONOLACTONE 25 MG/1
25 TABLET ORAL DAILY
Status: DISCONTINUED | OUTPATIENT
Start: 2022-11-01 | End: 2022-11-04 | Stop reason: HOSPADM

## 2022-11-01 RX ORDER — FENTANYL CITRATE 50 UG/ML
INJECTION, SOLUTION INTRAMUSCULAR; INTRAVENOUS PRN
Status: DISCONTINUED | OUTPATIENT
Start: 2022-11-01 | End: 2022-11-01 | Stop reason: SDUPTHER

## 2022-11-01 RX ORDER — SODIUM CHLORIDE 9 MG/ML
INJECTION, SOLUTION INTRAVENOUS PRN
Status: DISCONTINUED | OUTPATIENT
Start: 2022-11-01 | End: 2022-11-04 | Stop reason: HOSPADM

## 2022-11-01 RX ORDER — IPRATROPIUM BROMIDE AND ALBUTEROL SULFATE 2.5; .5 MG/3ML; MG/3ML
3 SOLUTION RESPIRATORY (INHALATION) 2 TIMES DAILY
Status: DISCONTINUED | OUTPATIENT
Start: 2022-11-01 | End: 2022-11-01 | Stop reason: ALTCHOICE

## 2022-11-01 RX ORDER — PROMETHAZINE HYDROCHLORIDE 25 MG/1
25 TABLET ORAL EVERY 8 HOURS PRN
Status: DISCONTINUED | OUTPATIENT
Start: 2022-11-01 | End: 2022-11-04 | Stop reason: HOSPADM

## 2022-11-01 RX ORDER — INSULIN LISPRO 100 [IU]/ML
0-4 INJECTION, SOLUTION INTRAVENOUS; SUBCUTANEOUS NIGHTLY
Status: DISCONTINUED | OUTPATIENT
Start: 2022-11-01 | End: 2022-11-04 | Stop reason: HOSPADM

## 2022-11-01 RX ORDER — GABAPENTIN 300 MG/1
300 CAPSULE ORAL NIGHTLY
Status: DISCONTINUED | OUTPATIENT
Start: 2022-11-01 | End: 2022-11-04 | Stop reason: HOSPADM

## 2022-11-01 RX ORDER — SODIUM CHLORIDE 0.9 % (FLUSH) 0.9 %
5-40 SYRINGE (ML) INJECTION EVERY 12 HOURS SCHEDULED
Status: DISCONTINUED | OUTPATIENT
Start: 2022-11-01 | End: 2022-11-01 | Stop reason: HOSPADM

## 2022-11-01 RX ORDER — KETOROLAC TROMETHAMINE 30 MG/ML
INJECTION, SOLUTION INTRAMUSCULAR; INTRAVENOUS PRN
Status: DISCONTINUED | OUTPATIENT
Start: 2022-11-01 | End: 2022-11-01 | Stop reason: SDUPTHER

## 2022-11-01 RX ORDER — ALBUTEROL SULFATE 2.5 MG/3ML
2.5 SOLUTION RESPIRATORY (INHALATION) 2 TIMES DAILY
Status: DISCONTINUED | OUTPATIENT
Start: 2022-11-01 | End: 2022-11-04 | Stop reason: HOSPADM

## 2022-11-01 RX ORDER — PHENELZINE SULFATE 15 MG/1
15 TABLET ORAL 3 TIMES DAILY
Status: DISCONTINUED | OUTPATIENT
Start: 2022-11-01 | End: 2022-11-04 | Stop reason: HOSPADM

## 2022-11-01 RX ORDER — OXYCODONE HYDROCHLORIDE 5 MG/1
10 TABLET ORAL EVERY 4 HOURS PRN
Status: DISCONTINUED | OUTPATIENT
Start: 2022-11-01 | End: 2022-11-02

## 2022-11-01 RX ORDER — PROCHLORPERAZINE EDISYLATE 5 MG/ML
5 INJECTION INTRAMUSCULAR; INTRAVENOUS
Status: COMPLETED | OUTPATIENT
Start: 2022-11-01 | End: 2022-11-01

## 2022-11-01 RX ORDER — SODIUM CHLORIDE 9 MG/ML
INJECTION, SOLUTION INTRAVENOUS PRN
Status: DISCONTINUED | OUTPATIENT
Start: 2022-11-01 | End: 2022-11-01 | Stop reason: HOSPADM

## 2022-11-01 RX ORDER — DEXTROSE MONOHYDRATE 100 MG/ML
INJECTION, SOLUTION INTRAVENOUS CONTINUOUS PRN
Status: DISCONTINUED | OUTPATIENT
Start: 2022-11-01 | End: 2022-11-04 | Stop reason: HOSPADM

## 2022-11-01 RX ORDER — ONDANSETRON 2 MG/ML
INJECTION INTRAMUSCULAR; INTRAVENOUS PRN
Status: DISCONTINUED | OUTPATIENT
Start: 2022-11-01 | End: 2022-11-01 | Stop reason: SDUPTHER

## 2022-11-01 RX ORDER — PROPOFOL 10 MG/ML
INJECTION, EMULSION INTRAVENOUS PRN
Status: DISCONTINUED | OUTPATIENT
Start: 2022-11-01 | End: 2022-11-01 | Stop reason: SDUPTHER

## 2022-11-01 RX ORDER — KETOROLAC TROMETHAMINE 30 MG/ML
15 INJECTION, SOLUTION INTRAMUSCULAR; INTRAVENOUS EVERY 6 HOURS
Status: DISCONTINUED | OUTPATIENT
Start: 2022-11-01 | End: 2022-11-04 | Stop reason: HOSPADM

## 2022-11-01 RX ORDER — SODIUM CHLORIDE 0.9 % (FLUSH) 0.9 %
5-40 SYRINGE (ML) INJECTION PRN
Status: DISCONTINUED | OUTPATIENT
Start: 2022-11-01 | End: 2022-11-01 | Stop reason: HOSPADM

## 2022-11-01 RX ORDER — ALBUTEROL SULFATE 2.5 MG/3ML
2.5 SOLUTION RESPIRATORY (INHALATION) ONCE
Status: COMPLETED | OUTPATIENT
Start: 2022-11-01 | End: 2022-11-01

## 2022-11-01 RX ORDER — SUCCINYLCHOLINE/SOD CL,ISO/PF 200MG/10ML
SYRINGE (ML) INTRAVENOUS PRN
Status: DISCONTINUED | OUTPATIENT
Start: 2022-11-01 | End: 2022-11-01 | Stop reason: SDUPTHER

## 2022-11-01 RX ORDER — ROCURONIUM BROMIDE 10 MG/ML
INJECTION, SOLUTION INTRAVENOUS PRN
Status: DISCONTINUED | OUTPATIENT
Start: 2022-11-01 | End: 2022-11-01 | Stop reason: SDUPTHER

## 2022-11-01 RX ORDER — ONDANSETRON 2 MG/ML
4 INJECTION INTRAMUSCULAR; INTRAVENOUS ONCE
Status: COMPLETED | OUTPATIENT
Start: 2022-11-01 | End: 2022-11-01

## 2022-11-01 RX ORDER — LANOLIN ALCOHOL/MO/W.PET/CERES
9 CREAM (GRAM) TOPICAL NIGHTLY
Status: DISCONTINUED | OUTPATIENT
Start: 2022-11-01 | End: 2022-11-04 | Stop reason: HOSPADM

## 2022-11-01 RX ORDER — ONDANSETRON 2 MG/ML
4 INJECTION INTRAMUSCULAR; INTRAVENOUS EVERY 6 HOURS PRN
Status: DISCONTINUED | OUTPATIENT
Start: 2022-11-01 | End: 2022-11-04 | Stop reason: HOSPADM

## 2022-11-01 RX ORDER — PANTOPRAZOLE SODIUM 40 MG/1
40 TABLET, DELAYED RELEASE ORAL DAILY
Status: DISCONTINUED | OUTPATIENT
Start: 2022-11-01 | End: 2022-11-04 | Stop reason: HOSPADM

## 2022-11-01 RX ORDER — SODIUM CHLORIDE 0.9 % (FLUSH) 0.9 %
10 SYRINGE (ML) INJECTION EVERY 12 HOURS SCHEDULED
Status: DISCONTINUED | OUTPATIENT
Start: 2022-11-01 | End: 2022-11-04 | Stop reason: HOSPADM

## 2022-11-01 RX ORDER — TORSEMIDE 20 MG/1
20 TABLET ORAL DAILY
Status: DISCONTINUED | OUTPATIENT
Start: 2022-11-01 | End: 2022-11-04 | Stop reason: HOSPADM

## 2022-11-01 RX ORDER — SODIUM CHLORIDE, SODIUM LACTATE, POTASSIUM CHLORIDE, CALCIUM CHLORIDE 600; 310; 30; 20 MG/100ML; MG/100ML; MG/100ML; MG/100ML
INJECTION, SOLUTION INTRAVENOUS CONTINUOUS
Status: DISCONTINUED | OUTPATIENT
Start: 2022-11-01 | End: 2022-11-02

## 2022-11-01 RX ORDER — ACETAMINOPHEN 325 MG/1
650 TABLET ORAL EVERY 6 HOURS
Status: DISCONTINUED | OUTPATIENT
Start: 2022-11-01 | End: 2022-11-04 | Stop reason: HOSPADM

## 2022-11-01 RX ORDER — PROMETHAZINE HYDROCHLORIDE 25 MG/ML
6.25 INJECTION, SOLUTION INTRAMUSCULAR; INTRAVENOUS PRN
Status: DISCONTINUED | OUTPATIENT
Start: 2022-11-01 | End: 2022-11-01 | Stop reason: HOSPADM

## 2022-11-01 RX ORDER — HEPARIN SODIUM 10000 [USP'U]/ML
5000 INJECTION, SOLUTION INTRAVENOUS; SUBCUTANEOUS EVERY 8 HOURS SCHEDULED
Status: DISCONTINUED | OUTPATIENT
Start: 2022-11-01 | End: 2022-11-04 | Stop reason: HOSPADM

## 2022-11-01 RX ORDER — PHENOL 1.4 %
10 AEROSOL, SPRAY (ML) MUCOUS MEMBRANE NIGHTLY
Status: DISCONTINUED | OUTPATIENT
Start: 2022-11-01 | End: 2022-11-01 | Stop reason: CLARIF

## 2022-11-01 RX ORDER — ALBUTEROL SULFATE 90 UG/1
2 AEROSOL, METERED RESPIRATORY (INHALATION) EVERY 6 HOURS PRN
Status: DISCONTINUED | OUTPATIENT
Start: 2022-11-01 | End: 2022-11-01 | Stop reason: CLARIF

## 2022-11-01 RX ORDER — LIDOCAINE HYDROCHLORIDE 20 MG/ML
INJECTION, SOLUTION EPIDURAL; INFILTRATION; INTRACAUDAL; PERINEURAL PRN
Status: DISCONTINUED | OUTPATIENT
Start: 2022-11-01 | End: 2022-11-01 | Stop reason: SDUPTHER

## 2022-11-01 RX ORDER — INSULIN LISPRO 100 [IU]/ML
0-8 INJECTION, SOLUTION INTRAVENOUS; SUBCUTANEOUS
Status: DISCONTINUED | OUTPATIENT
Start: 2022-11-01 | End: 2022-11-04 | Stop reason: HOSPADM

## 2022-11-01 RX ORDER — DEXAMETHASONE SODIUM PHOSPHATE 4 MG/ML
INJECTION, SOLUTION INTRA-ARTICULAR; INTRALESIONAL; INTRAMUSCULAR; INTRAVENOUS; SOFT TISSUE PRN
Status: DISCONTINUED | OUTPATIENT
Start: 2022-11-01 | End: 2022-11-01 | Stop reason: SDUPTHER

## 2022-11-01 RX ORDER — OXYCODONE HYDROCHLORIDE 5 MG/1
5 TABLET ORAL EVERY 4 HOURS PRN
Status: DISCONTINUED | OUTPATIENT
Start: 2022-11-01 | End: 2022-11-02

## 2022-11-01 RX ORDER — SODIUM CHLORIDE 9 MG/ML
INJECTION, SOLUTION INTRAVENOUS CONTINUOUS
Status: DISCONTINUED | OUTPATIENT
Start: 2022-11-01 | End: 2022-11-01

## 2022-11-01 RX ADMIN — ROCURONIUM BROMIDE 10 MG: 10 INJECTION, SOLUTION INTRAVENOUS at 07:54

## 2022-11-01 RX ADMIN — WATER 2000 MG: 1 INJECTION INTRAMUSCULAR; INTRAVENOUS; SUBCUTANEOUS at 07:57

## 2022-11-01 RX ADMIN — HYDROMORPHONE HYDROCHLORIDE 0.5 MG: 0.5 INJECTION, SOLUTION INTRAMUSCULAR; INTRAVENOUS; SUBCUTANEOUS at 10:34

## 2022-11-01 RX ADMIN — BUDESONIDE 250 MCG: 0.25 SUSPENSION RESPIRATORY (INHALATION) at 11:58

## 2022-11-01 RX ADMIN — PANTOPRAZOLE SODIUM 40 MG: 40 TABLET, DELAYED RELEASE ORAL at 17:02

## 2022-11-01 RX ADMIN — FENTANYL CITRATE 50 MCG: 50 INJECTION, SOLUTION INTRAMUSCULAR; INTRAVENOUS at 07:51

## 2022-11-01 RX ADMIN — KETOROLAC TROMETHAMINE 15 MG: 30 INJECTION, SOLUTION INTRAMUSCULAR; INTRAVENOUS at 17:02

## 2022-11-01 RX ADMIN — SUGAMMADEX 225 MG: 100 INJECTION, SOLUTION INTRAVENOUS at 09:36

## 2022-11-01 RX ADMIN — HYDROMORPHONE HYDROCHLORIDE 0.5 MG: 2 INJECTION, SOLUTION INTRAMUSCULAR; INTRAVENOUS; SUBCUTANEOUS at 09:40

## 2022-11-01 RX ADMIN — ENOXAPARIN SODIUM 40 MG: 100 INJECTION SUBCUTANEOUS at 06:45

## 2022-11-01 RX ADMIN — PROPOFOL 150 MG: 10 INJECTION, EMULSION INTRAVENOUS at 07:54

## 2022-11-01 RX ADMIN — FENTANYL CITRATE 50 MCG: 50 INJECTION, SOLUTION INTRAMUSCULAR; INTRAVENOUS at 08:24

## 2022-11-01 RX ADMIN — HYDROMORPHONE HYDROCHLORIDE 0.5 MG: 2 INJECTION, SOLUTION INTRAMUSCULAR; INTRAVENOUS; SUBCUTANEOUS at 09:49

## 2022-11-01 RX ADMIN — IPRATROPIUM BROMIDE 0.5 MG: 0.5 SOLUTION RESPIRATORY (INHALATION) at 20:56

## 2022-11-01 RX ADMIN — PHENELZINE SULFATE 15 MG: 15 TABLET ORAL at 21:52

## 2022-11-01 RX ADMIN — HYDROMORPHONE HYDROCHLORIDE 0.5 MG: 0.5 INJECTION, SOLUTION INTRAMUSCULAR; INTRAVENOUS; SUBCUTANEOUS at 10:05

## 2022-11-01 RX ADMIN — LORAZEPAM 0.5 MG: 0.5 TABLET ORAL at 21:34

## 2022-11-01 RX ADMIN — ROCURONIUM BROMIDE 40 MG: 10 INJECTION, SOLUTION INTRAVENOUS at 08:01

## 2022-11-01 RX ADMIN — SPIRONOLACTONE 25 MG: 25 TABLET ORAL at 17:02

## 2022-11-01 RX ADMIN — SODIUM CHLORIDE, POTASSIUM CHLORIDE, SODIUM LACTATE AND CALCIUM CHLORIDE: 600; 310; 30; 20 INJECTION, SOLUTION INTRAVENOUS at 17:01

## 2022-11-01 RX ADMIN — SODIUM CHLORIDE: 9 INJECTION, SOLUTION INTRAVENOUS at 07:35

## 2022-11-01 RX ADMIN — LIDOCAINE HYDROCHLORIDE 80 MG: 20 INJECTION, SOLUTION EPIDURAL; INFILTRATION; INTRACAUDAL; PERINEURAL at 07:51

## 2022-11-01 RX ADMIN — ARFORMOTEROL TARTRATE 15 MCG: 15 SOLUTION RESPIRATORY (INHALATION) at 20:56

## 2022-11-01 RX ADMIN — Medication 9 MG: at 21:34

## 2022-11-01 RX ADMIN — ACETAMINOPHEN 650 MG: 325 TABLET ORAL at 23:51

## 2022-11-01 RX ADMIN — HYDRALAZINE HYDROCHLORIDE 5 MG: 20 INJECTION INTRAMUSCULAR; INTRAVENOUS at 08:50

## 2022-11-01 RX ADMIN — KETOROLAC TROMETHAMINE 30 MG: 30 INJECTION, SOLUTION INTRAMUSCULAR at 09:23

## 2022-11-01 RX ADMIN — IPRATROPIUM BROMIDE 0.5 MG: 0.5 SOLUTION RESPIRATORY (INHALATION) at 16:58

## 2022-11-01 RX ADMIN — FENTANYL CITRATE 50 MCG: 50 INJECTION, SOLUTION INTRAMUSCULAR; INTRAVENOUS at 08:08

## 2022-11-01 RX ADMIN — DEXAMETHASONE SODIUM PHOSPHATE 10 MG: 4 INJECTION, SOLUTION INTRAMUSCULAR; INTRAVENOUS at 08:08

## 2022-11-01 RX ADMIN — PROCHLORPERAZINE EDISYLATE 5 MG: 5 INJECTION INTRAMUSCULAR; INTRAVENOUS at 10:08

## 2022-11-01 RX ADMIN — FENTANYL CITRATE 50 MCG: 50 INJECTION, SOLUTION INTRAMUSCULAR; INTRAVENOUS at 08:13

## 2022-11-01 RX ADMIN — ACETAMINOPHEN 650 MG: 325 TABLET ORAL at 17:02

## 2022-11-01 RX ADMIN — ARFORMOTEROL TARTRATE 15 MCG: 15 SOLUTION RESPIRATORY (INHALATION) at 11:58

## 2022-11-01 RX ADMIN — ALBUTEROL SULFATE 2.5 MG: 2.5 SOLUTION RESPIRATORY (INHALATION) at 20:56

## 2022-11-01 RX ADMIN — SODIUM CHLORIDE, PRESERVATIVE FREE 10 ML: 5 INJECTION INTRAVENOUS at 21:54

## 2022-11-01 RX ADMIN — OXYCODONE 10 MG: 5 TABLET ORAL at 13:41

## 2022-11-01 RX ADMIN — Medication 180 MG: at 07:54

## 2022-11-01 RX ADMIN — ONDANSETRON 4 MG: 2 INJECTION INTRAMUSCULAR; INTRAVENOUS at 07:26

## 2022-11-01 RX ADMIN — INSULIN LISPRO 4 UNITS: 100 INJECTION, SOLUTION INTRAVENOUS; SUBCUTANEOUS at 17:10

## 2022-11-01 RX ADMIN — HEPARIN SODIUM 5000 UNITS: 10000 INJECTION INTRAVENOUS; SUBCUTANEOUS at 21:34

## 2022-11-01 RX ADMIN — ALBUTEROL SULFATE 2.5 MG: 2.5 SOLUTION RESPIRATORY (INHALATION) at 10:26

## 2022-11-01 RX ADMIN — METRONIDAZOLE 500 MG: 500 INJECTION, SOLUTION INTRAVENOUS at 07:57

## 2022-11-01 RX ADMIN — ONDANSETRON 4 MG: 2 INJECTION INTRAMUSCULAR; INTRAVENOUS at 09:16

## 2022-11-01 RX ADMIN — FENTANYL CITRATE 50 MCG: 50 INJECTION, SOLUTION INTRAMUSCULAR; INTRAVENOUS at 08:17

## 2022-11-01 RX ADMIN — PROMETHAZINE HYDROCHLORIDE 6.25 MG: 25 INJECTION INTRAMUSCULAR; INTRAVENOUS at 10:46

## 2022-11-01 RX ADMIN — KETOROLAC TROMETHAMINE 15 MG: 30 INJECTION, SOLUTION INTRAMUSCULAR; INTRAVENOUS at 23:51

## 2022-11-01 RX ADMIN — HYDROMORPHONE HYDROCHLORIDE 0.5 MG: 2 INJECTION, SOLUTION INTRAMUSCULAR; INTRAVENOUS; SUBCUTANEOUS at 09:43

## 2022-11-01 RX ADMIN — HYDROMORPHONE HYDROCHLORIDE 0.5 MG: 2 INJECTION, SOLUTION INTRAMUSCULAR; INTRAVENOUS; SUBCUTANEOUS at 09:47

## 2022-11-01 RX ADMIN — BUDESONIDE 250 MCG: 0.25 SUSPENSION RESPIRATORY (INHALATION) at 20:56

## 2022-11-01 RX ADMIN — OXYCODONE 10 MG: 5 TABLET ORAL at 23:51

## 2022-11-01 RX ADMIN — GABAPENTIN 300 MG: 300 CAPSULE ORAL at 21:34

## 2022-11-01 RX ADMIN — TORSEMIDE 20 MG: 20 TABLET ORAL at 17:02

## 2022-11-01 ASSESSMENT — PAIN DESCRIPTION - LOCATION
LOCATION: ABDOMEN

## 2022-11-01 ASSESSMENT — PAIN DESCRIPTION - FREQUENCY
FREQUENCY: CONTINUOUS
FREQUENCY: CONTINUOUS

## 2022-11-01 ASSESSMENT — PAIN DESCRIPTION - PAIN TYPE
TYPE: ACUTE PAIN;SURGICAL PAIN
TYPE: SURGICAL PAIN

## 2022-11-01 ASSESSMENT — PAIN DESCRIPTION - DESCRIPTORS
DESCRIPTORS: DISCOMFORT
DESCRIPTORS: ACHING;DISCOMFORT;SORE;TENDER;SHARP
DESCRIPTORS: DISCOMFORT
DESCRIPTORS: DISCOMFORT

## 2022-11-01 ASSESSMENT — PAIN SCALES - GENERAL
PAINLEVEL_OUTOF10: 9
PAINLEVEL_OUTOF10: 7
PAINLEVEL_OUTOF10: 8
PAINLEVEL_OUTOF10: 10
PAINLEVEL_OUTOF10: 0

## 2022-11-01 ASSESSMENT — PAIN DESCRIPTION - ORIENTATION
ORIENTATION: MID

## 2022-11-01 ASSESSMENT — PAIN - FUNCTIONAL ASSESSMENT
PAIN_FUNCTIONAL_ASSESSMENT: ACTIVITIES ARE NOT PREVENTED
PAIN_FUNCTIONAL_ASSESSMENT: PREVENTS OR INTERFERES SOME ACTIVE ACTIVITIES AND ADLS
PAIN_FUNCTIONAL_ASSESSMENT: PREVENTS OR INTERFERES SOME ACTIVE ACTIVITIES AND ADLS
PAIN_FUNCTIONAL_ASSESSMENT: ACTIVITIES ARE NOT PREVENTED
PAIN_FUNCTIONAL_ASSESSMENT: 0-10

## 2022-11-01 ASSESSMENT — PAIN DESCRIPTION - ONSET: ONSET: ON-GOING

## 2022-11-01 ASSESSMENT — LIFESTYLE VARIABLES: SMOKING_STATUS: 0

## 2022-11-01 ASSESSMENT — ENCOUNTER SYMPTOMS: SHORTNESS OF BREATH: 1

## 2022-11-01 ASSESSMENT — COPD QUESTIONNAIRES: CAT_SEVERITY: MODERATE

## 2022-11-01 NOTE — ANESTHESIA PRE PROCEDURE
Department of Anesthesiology  Preprocedure Note       Name:  Dianne Victoria   Age:  67 y.o.  :  1950                                          MRN:  [id number]         Date:  2022      Surgeon: Tonya Garber):  Drew Mcwilliams MD    Procedure: Procedure(s):  LAPAROSCOPIC SIGMOID RESECTION    Medications prior to admission:   Prior to Admission medications    Medication Sig Start Date End Date Taking? Authorizing Provider   glipiZIDE (GLUCOTROL) 5 MG tablet Take 2 mg by mouth 2 times daily (before meals)    Historical Provider, MD   Roflumilast (DALIRESP) 500 MCG tablet Take 500 mcg by mouth at bedtime    Historical Provider, MD   HYDROcodone-acetaminophen (NORCO) 5-325 MG per tablet Take 1 tablet by mouth three times daily. Historical Provider, MD   Budeson-Glycopyrrol-Formoterol (BREZTRI AEROSPHERE) 160-9-4.8 MCG/ACT AERO Inhale 2 puffs into the lungs 2 times daily 11/10/21   Historical Provider, MD   ipratropium-albuterol (DUONEB) 0.5-2.5 (3) MG/3ML SOLN nebulizer solution Inhale 3 mLs into the lungs 2 times daily 21   Historical Provider, MD   LORazepam (ATIVAN) 0.5 MG tablet Take 0.5 mg by mouth 3 times daily. 11/10/21   Historical Provider, MD   spironolactone (ALDACTONE) 25 MG tablet Take 25 mg by mouth daily 12/3/21   Historical Provider, MD   ammonium lactate (LAC-HYDRIN) 12 % lotion Apply topically daily.  10/27/21   [facility] DPM   albuterol sulfate HFA (PROVENTIL;VENTOLIN;PROAIR) 108 (90 Base) MCG/ACT inhaler Inhale 2 puffs into the lungs every 6 hours as needed for Wheezing    Historical Provider, MD   omeprazole (PRILOSEC) 40 MG delayed release capsule Take 40 mg by mouth at bedtime    Historical Provider, MD   OXYGEN Inhale 2 L into the lungs nightly as needed    Historical Provider, MD   phenelzine (NARDIL) 15 MG tablet Take 1 tablet by mouth 3 times daily 18   Bisi Douglas DO   benazepril (LOTENSIN) 10 MG tablet Take 1 tablet by mouth nightly  Patient not taking: Reported on 11/1/2022 8/8/18   David Cleveland DO   vitamin B-12 (CYANOCOBALAMIN) 500 MCG tablet Take 500 mcg by mouth daily    Historical Provider, MD   promethazine (PHENERGAN) 25 MG tablet Take 25 mg by mouth every 8 hours as needed     Historical Provider, MD   torsemide (DEMADEX) 20 MG tablet Take 20 mg by mouth daily 5/16/17   Historical Provider, MD   Polyethylene Glycol 3350 (MIRALAX PO) Take by mouth as needed    Historical Provider, MD   gabapentin (NEURONTIN) 300 MG capsule Take 300 mg by mouth nightly     Historical Provider, MD   Cholecalciferol (VITAMIN D-3 PO) Take 2,000 Units by mouth nightly     Historical Provider, MD   Melatonin 10 MG TABS Take by mouth nightly     Historical Provider, MD       Current medications:    No current facility-administered medications for this visit. No current outpatient medications on file. Facility-Administered Medications Ordered in Other Visits   Medication Dose Route Frequency Provider Last Rate Last Admin    sodium chloride flush 0.9 % injection 5-40 mL  5-40 mL IntraVENous 2 times per day Paco York MD        sodium chloride flush 0.9 % injection 5-40 mL  5-40 mL IntraVENous PRN Paco York MD        0.9 % sodium chloride infusion   IntraVENous Continuous Paco York MD        ceFAZolin (ANCEF) 2,000 mg in sterile water 20 mL IV syringe  2,000 mg IntraVENous On Call to [address] [place] [address] MD Jaden        And    metronidazole (FLAGYL) 500 mg in 0.9% NaCl 100 mL IVPB premix  500 mg IntraVENous On Call to [address] [place] [address] MD Jaden           Allergies:     Allergies   Allergen Reactions    Penicillins Anaphylaxis and Shortness Of Breath    Sulfa Antibiotics Itching and Swelling       Problem List:    Patient Active Problem List   Diagnosis Code    HTN (hypertension), benign I10    Diabetes mellitus type 2, controlled ([facility] E11.9    Vertebral compression fracture (HCC) M48.50XA    SSS (sick sinus syndrome) (HCC) I49.5    COPD (chronic obstructive pulmonary disease) ([address] J44.9    Depression F32. A    OA (osteoarthritis) M19.90    Obesity (BMI 30.0-34. 9) E66.9    Pacemaker Z95.0    Dyspnea R06.00    Chest pain of uncertain etiology T25.8    Near syncope R55    Orthostatic hypotension I95.1    Onychomycosis B35.1    PVD (peripheral vascular disease) ([facility]) I73.9    Type II diabetes mellitus with peripheral circulatory disorder ([facility]) E11.51    Pain of toe of right foot M79.674    Pain of toe of left foot M79.675    Difficulty walking R26.2    Contusion of right great toe without damage to nail S90.111A    Centrilobular emphysema ([facility]) J43.2    Chronic respiratory failure ([facility]) J96.10    Daytime somnolence R40.0    Pain in limb M79.609    Peripheral vascular disorder due to diabetes mellitus ([facility]) E11.51    Posterior rhinorrhea J34.89    Sleep disturbance G47.9    Other partial intestinal obstruction ([facility]) K56.690       Past Medical History:        Diagnosis Date    Arthritis     Chronic pain     COPD (chronic obstructive pulmonary disease) ([facility])     Depression     Diabetes ([facility])     History of left shoulder fracture     Hx of blood clots     Patient reports \"blood clots in my feet\" about 18 months ago    Hypertension     Neuropathy     Pacemaker     last interrogation 22 Epic cardiology    PONV (postoperative nausea and vomiting)     Pulmonary hypertension ([address]     Sinus arrest     SSS (sick sinus syndrome) ([address]     Syncope and collapse        Past Surgical History:        Procedure Laterality Date    APPENDECTOMY      ARM SURGERY Left 2022    EXCISION LEFT UPPER ARM LIPOMA performed by Ashwini Larry MD at [address]  2016    [employer]     SECTION      x3    CHOLECYSTECTOMY, LAPAROSCOPIC N/A 2022    LAPAROSCOPIC CHOLECYSTECTOMY POSSIBLE OPEN POSSIBLE GRAM performed by Ashwini Larry MD at [address] [facility] (CERVIX STATUS UNKNOWN)      LEG SURGERY Left 2021    LIPOMA RESECTION  2021    TONSILLECTOMY      TUBAL LIGATION         Social History:    Social History     Tobacco Use    Smoking status: Former     Packs/day: 1.00     Types: Cigarettes     Start date: 1963     Quit date: 2006     Years since quittin.2    Smokeless tobacco: Never   Substance Use Topics    Alcohol use: Not Currently                                Counseling given: Not Answered      Vital Signs (Current): There were no vitals filed for this visit. BP Readings from Last 3 Encounters:   22 130/62   10/27/22 (!) 143/63   22 (!) 174/78       NPO Status:   Last solid consumption: 10/29/22 @ 1800  Last liquid consumption: 10/31/22 @ 2200                                                                               BMI:   Wt Readings from Last 3 Encounters:   22 200 lb (90.7 kg)   10/28/22 195 lb (88.5 kg)   10/27/22 200 lb (90.7 kg)     There is no height or weight on file to calculate BMI.    CBC:   Lab Results   Component Value Date/Time    WBC 13.0 10/27/2022 11:35 AM    RBC 4.54 10/27/2022 11:35 AM    HGB 13.1 10/27/2022 11:35 AM    HCT 40.4 10/27/2022 11:35 AM    MCV 89.0 10/27/2022 11:35 AM    RDW 12.6 10/27/2022 11:35 AM     10/27/2022 11:35 AM       CMP:   Lab Results   Component Value Date/Time     10/27/2022 11:35 AM    K 3.7 10/27/2022 11:35 AM    CL 93 10/27/2022 11:35 AM    CO2 30 10/27/2022 11:35 AM    BUN 40 10/27/2022 11:35 AM    CREATININE 1.7 10/27/2022 11:35 AM    GFRAA 53 2022 07:36 AM    LABGLOM 32 10/27/2022 11:35 AM    GLUCOSE 180 10/27/2022 11:35 AM    PROT 6.0 2018 03:45 AM    CALCIUM 10.1 10/27/2022 11:35 AM    BILITOT 0.4 2018 03:45 AM    ALKPHOS 82 2018 03:45 AM    AST 13 2018 03:45 AM    ALT 14 2018 03:45 AM       POC Tests: No results for input(s): POCGLU, POCNA, POCK, POCCL, POCBUN, POCHEMO, POCHCT in the last 72 hours.     Coags:   Lab Results Component Value Date/Time    PROTIME 12.4 2016 05:30 AM    INR 1.2 2016 05:30 AM    APTT 30.0 2016 07:05 AM       HCG (If Applicable): No results found for: PREGTESTUR, PREGSERUM, HCG, HCGQUANT     ABGs: No results found for: PHART, PO2ART, DYK7DOX, IZT9TBR, BEART, W3XIRPCP     Type & Screen (If Applicable):  No results found for: LABABO, LABRH    Drug/Infectious Status (If Applicable):  No results found for: HIV, HEPCAB    COVID-19 Screening (If Applicable): No results found for: COVID19      EK22:   Narrative & Impression    Normal sinus rhythm  When compared with ECG of 07-AUG-2018 03:27,  No significant change was found  Confirmed by Tunde Stephen ([id number]) on 2022 9:53:15 AM         ECHO 21: Findings      Left Ventricle   Normal left ventricle size and systolic function. Ejection fraction is visually estimated at 60-65%. No regional wall motion abnormalities seen. Normal left ventricle wall thickness. Indeterminate diastolic function. Right Ventricle   Normal right ventricular size and function. TAPSE 23 mm. Pacer wire visualized in right ventricle. Left Atrium   The left atrium is mildly dilated. Right Atrium   Normal right atrium size. Mitral Valve   Mild mitral annular calcification. No evidence of mitral valve stenosis. Physiologic and/or trace mitral regurgitation is present. Tricuspid Valve   The tricuspid valve appears structurally normal.   Physiologic and/or trace tricuspid regurgitation. RVSP is 34 mmHg. Normal estimated PA systolic pressure. Aortic Valve   Individual aortic valve leaflets are not clearly visualized. No hemodynamically significant aortic stenosis is present. No evidence of aortic valve regurgitation. Pulmonic Valve   The pulmonic valve was not well visualized. No evidence of any pulmonic regurgitation. No evidence of pulmonic valve stenosis.       Pericardial Effusion   No evidence for hemodynamically significant pericardial effusion. Pleural Effusion   No evidence of pleural effusion. Aorta   Normal aortic root and ascending aorta. Miscellaneous   The inferior vena cava diameter is normal with normal respiratory   variation. Conclusions      Summary   Normal left ventricle size and systolic function. Ejection fraction is visually estimated at 60-65%. No regional wall motion abnormalities seen. Normal left ventricle wall thickness. Indeterminate diastolic function. The left atrium is mildly dilated. Normal right ventricular size and function. TAPSE 23 mm   Physiologic and/or trace mitral regurgitation is present. No hemodynamically significant aortic stenosis is present. Physiologic and/or trace tricuspid regurgitation. RVSP is 34 mmHg. Normal estimated PA systolic pressure. No evidence for hemodynamically significant pericardial effusion. Anesthesia Evaluation  Patient summary reviewed   history of anesthetic complications: PONV. Airway: Mallampati: III  TM distance: >3 FB   Neck ROM: full  Mouth opening: > = 3 FB   Dental:    (+) partials  Comment: Upper partial removed     Pulmonary:   (+) COPD (pt wears 2L at bedtime and PRN): moderate,  shortness of breath: chronic,  decreased breath sounds     (-) not a current smoker ( former)          Patient did not smoke on day of surgery.                  Cardiovascular:  Exercise tolerance: poor (<4 METS),   (+) hypertension:, angina (pt states sometimes when she is laying flat in bed): at rest, pacemaker: pacemaker, dysrhythmias (sick sinus syndrome):, CHF:, RODRIGUEZ:, pulmonary hypertension: no interval change, hyperlipidemia      ECG reviewed  Rhythm: regular  Rate: normal  Echocardiogram reviewed         Beta Blocker:  Not on Beta Blocker      ROS comment: SSS (sick sinus syndrome)   Orthostatic hypotension     Neuro/Psych:   (+) neuromuscular disease (pt rpeorts numbness tingling in legs and hands):, psychiatric history:depression/anxiety              ROS comment: Syncope and collapse  Vertebral compression fracture   Chronic Pain  GI/Hepatic/Renal:   (+) hiatal hernia, GERD: poorly controlled,           Endo/Other:    (+) DiabetesType II DM, , : arthritis: OA., . Pt had no PAT visit       Abdominal:   (+) obese,           Vascular:   + PVD, aortic or cerebral ( pvd), DVT (hx of blood clots in R foot ), . Other Findings: Pt states she is current feeling nauseated. Pt to receive scopalamine patch and zofran in preop. Anesthesia Plan      general     ASA 3       Induction: intravenous and rapid sequence. BIS  MIPS: Postoperative opioids intended, Prophylactic antiemetics administered and Postoperative trial extubation. Anesthetic plan and risks discussed with patient. Plan discussed with attending and CRNA. Attending anesthesiologist reviewed and agrees with Preprocedure content                Neftaly Trujillo RN, [address] [place] [address]  11/1/2022      DOS STAFF ADDENDUM:    Pt seen and examined, physical exam updated, chart reviewed including anesthesia, drug and allergy history. H&P reviewed. No interval changes to history or physical examination (unless noted above). NPO status confirmed. Anesthetic plan, risks, benefits, alternatives discussed with patient. Patient verbalized an understanding and agrees to proceed. Vu Quan MD   Anesthesiologist    Patient seen; chart reviewed and agree with above.     Domi Dhillon, Kailyn Tejeda MD

## 2022-11-01 NOTE — ANESTHESIA POSTPROCEDURE EVALUATION
Department of Anesthesiology  Postprocedure Note    Patient: Julia Byrd  MRN: [id number]  YOB: 1950  Date of evaluation: 11/1/2022      Procedure Summary     Date: 11/01/22 Room / Location: [facility] OR 07 / [facility]    Anesthesia Start: 7255 Anesthesia Stop: 2473    Procedure: LAPAROSCOPIC SIGMOID COLON RESECTION (Abdomen) Diagnosis:       Intraoperative partial intestinal obstruction (HCC)      Constipation, unspecified constipation type      (Intraoperative partial intestinal obstruction ([address] [K91.81, K56.690])      (Constipation, unspecified constipation type [K59.00])    Surgeons: Clementina Fox MD Responsible Provider: Belén Garza MD    Anesthesia Type: general ASA Status: 3          Anesthesia Type: No value filed.     Tasia Phase I: Tasia Score: 9    Tasia Phase II:        Anesthesia Post Evaluation    Patient location during evaluation: PACU  Patient participation: complete - patient participated  Level of consciousness: awake and alert  Pain score: 2  Airway patency: patent  Nausea & Vomiting: no vomiting and no nausea  Complications: no  Cardiovascular status: hemodynamically stable  Respiratory status: spontaneous ventilation  Hydration status: stable  Multimodal analgesia pain management approach

## 2022-11-01 NOTE — OP NOTE
Operative Note      Patient: Kathleen Chavez  YOB: 1950  MRN: [id number]    Date of Procedure: 11/1/2022    Pre-Op Diagnosis: Intraoperative partial intestinal obstruction ([address] [K91.81, K56.690]  Constipation, unspecified constipation type [K59.00]    Post-Op Diagnosis: Same       Procedure(s):  LAPAROSCOPIC SIGMOID COLON RESECTION    Surgeon(s):  Destin Ly MD    Assistant:   Resident: Vernon Villela DO    Anesthesia: General    Estimated Blood Loss (mL): Minimal    Complications: None    Specimens:   ID Type Source Tests Collected by Time Destination   A : SIGMOID COLON Tissue Colon SURGICAL PATHOLOGY Destin Ly MD 11/1/2022 0223        Implants:  * No implants in log *      Drains:   Urinary Catheter 11/01/22 Lemus (Active)       Findings: See below    Detailed Description of Procedure:   Patient was taken to the operating room placed on the operating table in a supine position. General anesthesia was administered the patient was placed in lithotomy. A Lemus catheter was introduced. A 5 mm incision was made vertically above the umbilicus. Abdominal wall was tented upward and a varies needle was introduced. Placement was confirmed the drip test.  Abdomen was insufflated 15 mmHg. Needle was removed and a 5 mm trocar was inserted. A 5 mm 30 degree laparoscope was inserted. Patient was placed in Trendelenburg. A 5 mm port was placed in the right lower quadrant under direct vision. Another 5 mm port was placed on the right side of the abdomen more cephalad. Small bowel was reduced out of the pelvis. A significant amount of colon was stuck in the pelvis. I began with a lateral dissection and mobilized the white line of the sigmoid and carried approximately into the descending colon. The correct plane was identified, of the left ureter was identified, and the colon was mobilized out of the retroperitoneum without much difficulty.   Once the ureter was identified, I was able to further dissect the sigmoid mesentery and the rectum off of the left pelvic sidewall. I then proceeded with a medial dissection and entered the avascular space behind the mesorectum at the level of the sacral promontory. This was all done with the vessel sealer and blunt dissection. Eventually I found some soft rectum and divided the mesorectum at that level with the vessel sealer. The right lower quadrant 5 mm port was then upsized to a 10 mm port, and the rectum was divided with the [place] 60 mm stapler. And then further completed the dissection by dividing the sigmoid mesentery with the vessel sealer. Once I was happy with the length of colon mobilized, I removed the supraumbilical trocar and extended this incision vertically. Soft tissues were divided with cautery. The fascia was identified and divided with cautery. The abdomen was entered. A GelPort was placed. The colon was extruded out the GelPort. A point of division was selected proximally. The mesentery at this level was divided with the vessel sealer. A colotomy was made in the specimen and a 29 mm anvil was inserted retrograde. The colon was divided and the specimen was delivered. The anvil was brought out through the sidewall of the proximal colon and secured with a 3-0 Prolene suture. The colon was reintroduced into the abdomen. The GelPort Was placed and the abdomen was reinflated. A 29 mm EEA stapler was inserted rectally to the rectal staple line. The spike was extended, the anvil and spike were joined. Stapler was closed and fired. Stapler was removed, the donuts were inspected and appeared intact. A leak test was negative. The 10 mm port in the right lower quadrant was removed and the fascia was closed with an 0 Vicryl suture using the Trace-Segundo needle. We then deflated the abdomen and removed the GelPort and the remaining ports. The fascia of the midline incision was closed with a running 0 PDS suture. Local anesthetic was injected in all the incisions. The skin of all the incisions was closed with 4-0 Vicryl sutures and Dermabond. Patient was wake from anesthesia and taken to recovery.      Electronically signed by Vandana Bertrand MD on 11/1/2022 at 9:29 AM

## 2022-11-01 NOTE — INTERVAL H&P NOTE
Update History & Physical    The patient's History and Physical of October 3, 2022 was reviewed with the patient and I examined the patient. There was no change. The surgical site was confirmed by the patient and me. Plan: The risks, benefits, expected outcome, and alternative to the recommended procedure have been discussed with the patient. Patient understands and wants to proceed with the procedure.      Electronically signed by Vini Haywood MD on 11/1/2022 at 6:21 AM

## 2022-11-02 LAB
ANION GAP SERPL CALCULATED.3IONS-SCNC: 12 MMOL/L (ref 7–16)
BASOPHILS ABSOLUTE: 0.01 E9/L (ref 0–0.2)
BASOPHILS RELATIVE PERCENT: 0.1 % (ref 0–2)
BUN BLDV-MCNC: 16 MG/DL (ref 6–23)
CALCIUM SERPL-MCNC: 9.3 MG/DL (ref 8.6–10.2)
CHLORIDE BLD-SCNC: 98 MMOL/L (ref 98–107)
CO2: 27 MMOL/L (ref 22–29)
CREAT SERPL-MCNC: 1.3 MG/DL (ref 0.5–1)
EOSINOPHILS ABSOLUTE: 0 E9/L (ref 0.05–0.5)
EOSINOPHILS RELATIVE PERCENT: 0 % (ref 0–6)
GFR SERPL CREATININE-BSD FRML MDRD: 44 ML/MIN/1.73
GLUCOSE BLD-MCNC: 160 MG/DL (ref 74–99)
HCT VFR BLD CALC: 30.2 % (ref 34–48)
HEMOGLOBIN: 10 G/DL (ref 11.5–15.5)
IMMATURE GRANULOCYTES #: 0.04 E9/L
IMMATURE GRANULOCYTES %: 0.3 % (ref 0–5)
LYMPHOCYTES ABSOLUTE: 0.9 E9/L (ref 1.5–4)
LYMPHOCYTES RELATIVE PERCENT: 7.9 % (ref 20–42)
MAGNESIUM: 1.5 MG/DL (ref 1.6–2.6)
MCH RBC QN AUTO: 29.1 PG (ref 26–35)
MCHC RBC AUTO-ENTMCNC: 33.1 % (ref 32–34.5)
MCV RBC AUTO: 87.8 FL (ref 80–99.9)
METER GLUCOSE: 158 MG/DL (ref 74–99)
METER GLUCOSE: 170 MG/DL (ref 74–99)
METER GLUCOSE: 172 MG/DL (ref 74–99)
METER GLUCOSE: 190 MG/DL (ref 74–99)
MONOCYTES ABSOLUTE: 0.71 E9/L (ref 0.1–0.95)
MONOCYTES RELATIVE PERCENT: 6.2 % (ref 2–12)
NEUTROPHILS ABSOLUTE: 9.79 E9/L (ref 1.8–7.3)
NEUTROPHILS RELATIVE PERCENT: 85.5 % (ref 43–80)
PDW BLD-RTO: 12.4 FL (ref 11.5–15)
PLATELET # BLD: 254 E9/L (ref 130–450)
PMV BLD AUTO: 9.4 FL (ref 7–12)
POTASSIUM REFLEX MAGNESIUM: 3.4 MMOL/L (ref 3.5–5)
RBC # BLD: 3.44 E12/L (ref 3.5–5.5)
SODIUM BLD-SCNC: 137 MMOL/L (ref 132–146)
WBC # BLD: 11.5 E9/L (ref 4.5–11.5)

## 2022-11-02 PROCEDURE — 2580000003 HC RX 258: Performed by: SURGERY

## 2022-11-02 PROCEDURE — 6360000002 HC RX W HCPCS: Performed by: SURGERY

## 2022-11-02 PROCEDURE — 85025 COMPLETE CBC W/AUTO DIFF WBC: CPT

## 2022-11-02 PROCEDURE — 82962 GLUCOSE BLOOD TEST: CPT

## 2022-11-02 PROCEDURE — 6370000000 HC RX 637 (ALT 250 FOR IP): Performed by: SURGERY

## 2022-11-02 PROCEDURE — 36415 COLL VENOUS BLD VENIPUNCTURE: CPT

## 2022-11-02 PROCEDURE — 6360000002 HC RX W HCPCS: Performed by: STUDENT IN AN ORGANIZED HEALTH CARE EDUCATION/TRAINING PROGRAM

## 2022-11-02 PROCEDURE — 83735 ASSAY OF MAGNESIUM: CPT

## 2022-11-02 PROCEDURE — 6370000000 HC RX 637 (ALT 250 FOR IP): Performed by: STUDENT IN AN ORGANIZED HEALTH CARE EDUCATION/TRAINING PROGRAM

## 2022-11-02 PROCEDURE — 80048 BASIC METABOLIC PNL TOTAL CA: CPT

## 2022-11-02 PROCEDURE — 94640 AIRWAY INHALATION TREATMENT: CPT

## 2022-11-02 PROCEDURE — 1200000000 HC SEMI PRIVATE

## 2022-11-02 RX ORDER — MAGNESIUM SULFATE IN WATER 40 MG/ML
2000 INJECTION, SOLUTION INTRAVENOUS ONCE
Status: COMPLETED | OUTPATIENT
Start: 2022-11-02 | End: 2022-11-02

## 2022-11-02 RX ORDER — POTASSIUM CHLORIDE 20 MEQ/1
40 TABLET, EXTENDED RELEASE ORAL ONCE
Status: COMPLETED | OUTPATIENT
Start: 2022-11-02 | End: 2022-11-02

## 2022-11-02 RX ORDER — HYDROCODONE BITARTRATE AND ACETAMINOPHEN 5; 325 MG/1; MG/1
1 TABLET ORAL EVERY 6 HOURS PRN
Status: DISCONTINUED | OUTPATIENT
Start: 2022-11-02 | End: 2022-11-04 | Stop reason: HOSPADM

## 2022-11-02 RX ADMIN — ACETAMINOPHEN 650 MG: 325 TABLET ORAL at 13:16

## 2022-11-02 RX ADMIN — HEPARIN SODIUM 5000 UNITS: 10000 INJECTION INTRAVENOUS; SUBCUTANEOUS at 22:19

## 2022-11-02 RX ADMIN — ACETAMINOPHEN 650 MG: 325 TABLET ORAL at 20:36

## 2022-11-02 RX ADMIN — BUDESONIDE 250 MCG: 0.25 SUSPENSION RESPIRATORY (INHALATION) at 21:06

## 2022-11-02 RX ADMIN — ALBUTEROL SULFATE 2.5 MG: 2.5 SOLUTION RESPIRATORY (INHALATION) at 21:06

## 2022-11-02 RX ADMIN — ONDANSETRON 4 MG: 2 INJECTION INTRAMUSCULAR; INTRAVENOUS at 08:25

## 2022-11-02 RX ADMIN — HYDROCODONE BITARTRATE AND ACETAMINOPHEN 1 TABLET: 5; 325 TABLET ORAL at 17:11

## 2022-11-02 RX ADMIN — KETOROLAC TROMETHAMINE 15 MG: 30 INJECTION, SOLUTION INTRAMUSCULAR; INTRAVENOUS at 05:32

## 2022-11-02 RX ADMIN — PHENELZINE SULFATE 15 MG: 15 TABLET ORAL at 22:18

## 2022-11-02 RX ADMIN — IPRATROPIUM BROMIDE 0.5 MG: 0.5 SOLUTION RESPIRATORY (INHALATION) at 21:06

## 2022-11-02 RX ADMIN — HEPARIN SODIUM 5000 UNITS: 10000 INJECTION INTRAVENOUS; SUBCUTANEOUS at 13:17

## 2022-11-02 RX ADMIN — PHENELZINE SULFATE 15 MG: 15 TABLET ORAL at 07:14

## 2022-11-02 RX ADMIN — ALBUTEROL SULFATE 2.5 MG: 2.5 SOLUTION RESPIRATORY (INHALATION) at 07:48

## 2022-11-02 RX ADMIN — HYDROCODONE BITARTRATE AND ACETAMINOPHEN 1 TABLET: 5; 325 TABLET ORAL at 10:32

## 2022-11-02 RX ADMIN — GABAPENTIN 300 MG: 300 CAPSULE ORAL at 22:18

## 2022-11-02 RX ADMIN — TORSEMIDE 20 MG: 20 TABLET ORAL at 17:11

## 2022-11-02 RX ADMIN — BUDESONIDE 250 MCG: 0.25 SUSPENSION RESPIRATORY (INHALATION) at 07:48

## 2022-11-02 RX ADMIN — SPIRONOLACTONE 25 MG: 25 TABLET ORAL at 17:11

## 2022-11-02 RX ADMIN — KETOROLAC TROMETHAMINE 15 MG: 30 INJECTION, SOLUTION INTRAMUSCULAR; INTRAVENOUS at 20:37

## 2022-11-02 RX ADMIN — Medication 9 MG: at 22:17

## 2022-11-02 RX ADMIN — IPRATROPIUM BROMIDE 0.5 MG: 0.5 SOLUTION RESPIRATORY (INHALATION) at 12:23

## 2022-11-02 RX ADMIN — POTASSIUM CHLORIDE 40 MEQ: 1500 TABLET, EXTENDED RELEASE ORAL at 07:15

## 2022-11-02 RX ADMIN — PANTOPRAZOLE SODIUM 40 MG: 40 TABLET, DELAYED RELEASE ORAL at 07:15

## 2022-11-02 RX ADMIN — ARFORMOTEROL TARTRATE 15 MCG: 15 SOLUTION RESPIRATORY (INHALATION) at 07:48

## 2022-11-02 RX ADMIN — MAGNESIUM SULFATE HEPTAHYDRATE 2000 MG: 40 INJECTION, SOLUTION INTRAVENOUS at 07:16

## 2022-11-02 RX ADMIN — LORAZEPAM 0.5 MG: 0.5 TABLET ORAL at 22:18

## 2022-11-02 RX ADMIN — IPRATROPIUM BROMIDE 0.5 MG: 0.5 SOLUTION RESPIRATORY (INHALATION) at 15:55

## 2022-11-02 RX ADMIN — ARFORMOTEROL TARTRATE 15 MCG: 15 SOLUTION RESPIRATORY (INHALATION) at 21:06

## 2022-11-02 RX ADMIN — KETOROLAC TROMETHAMINE 15 MG: 30 INJECTION, SOLUTION INTRAMUSCULAR; INTRAVENOUS at 13:16

## 2022-11-02 RX ADMIN — IPRATROPIUM BROMIDE 0.5 MG: 0.5 SOLUTION RESPIRATORY (INHALATION) at 07:48

## 2022-11-02 RX ADMIN — HEPARIN SODIUM 5000 UNITS: 10000 INJECTION INTRAVENOUS; SUBCUTANEOUS at 05:33

## 2022-11-02 RX ADMIN — ACETAMINOPHEN 650 MG: 325 TABLET ORAL at 05:32

## 2022-11-02 RX ADMIN — SODIUM CHLORIDE, PRESERVATIVE FREE 10 ML: 5 INJECTION INTRAVENOUS at 20:40

## 2022-11-02 ASSESSMENT — PAIN DESCRIPTION - LOCATION
LOCATION: ABDOMEN

## 2022-11-02 ASSESSMENT — PAIN SCALES - GENERAL
PAINLEVEL_OUTOF10: 5
PAINLEVEL_OUTOF10: 5
PAINLEVEL_OUTOF10: 7
PAINLEVEL_OUTOF10: 5
PAINLEVEL_OUTOF10: 7
PAINLEVEL_OUTOF10: 5
PAINLEVEL_OUTOF10: 6

## 2022-11-02 ASSESSMENT — PAIN DESCRIPTION - DESCRIPTORS
DESCRIPTORS: ACHING;DISCOMFORT
DESCRIPTORS: ACHING;BURNING;DISCOMFORT
DESCRIPTORS: ACHING;BURNING;DISCOMFORT
DESCRIPTORS: ACHING;DISCOMFORT
DESCRIPTORS: ACHING;BURNING;DISCOMFORT

## 2022-11-02 ASSESSMENT — PAIN DESCRIPTION - ORIENTATION
ORIENTATION: RIGHT;LEFT
ORIENTATION: RIGHT;LEFT
ORIENTATION: RIGHT;LEFT;MID
ORIENTATION: RIGHT;LEFT;MID;LOWER;UPPER
ORIENTATION: MID

## 2022-11-02 ASSESSMENT — PAIN - FUNCTIONAL ASSESSMENT
PAIN_FUNCTIONAL_ASSESSMENT: ACTIVITIES ARE NOT PREVENTED
PAIN_FUNCTIONAL_ASSESSMENT: PREVENTS OR INTERFERES SOME ACTIVE ACTIVITIES AND ADLS

## 2022-11-02 ASSESSMENT — PAIN DESCRIPTION - FREQUENCY: FREQUENCY: CONTINUOUS

## 2022-11-02 ASSESSMENT — PAIN DESCRIPTION - ONSET: ONSET: ON-GOING

## 2022-11-02 ASSESSMENT — PAIN DESCRIPTION - PAIN TYPE: TYPE: SURGICAL PAIN

## 2022-11-02 NOTE — PLAN OF CARE
Problem: Chronic Conditions and Co-morbidities  Goal: Patient's chronic conditions and co-morbidity symptoms are monitored and maintained or improved  11/2/2022 0156 by Tanner Jauregui RN  Outcome: Progressing     Problem: Discharge Planning  Goal: Discharge to home or other facility with appropriate resources  11/2/2022 0156 by Tanner Jauregui RN  Outcome: Progressing     Problem: Pain  Goal: Verbalizes/displays adequate comfort level or baseline comfort level  11/2/2022 0156 by Tanner Jauregui RN  Outcome: Progressing     Problem: ABCDS Injury Assessment  Goal: Absence of physical injury  11/2/2022 0156 by Tanner Jauregui RN  Outcome: Progressing     Problem: Safety - Adult  Goal: Free from fall injury  11/2/2022 0156 by Tanner Jauregui RN  Outcome: Progressing

## 2022-11-02 NOTE — CARE COORDINATION
Met with patient about diagnosis and discharge plan of care. POD#1 lap sigmoid colon resection. Iv fluids, up in chair, full liquid diet. Pt lives alone in [address] Independent prior to admit. Pt [facility]2 2L[facility] No home care orders. Daughter is going to stay with pt upon discharge. PCP is Dr Som Urrutia. Plan is home with no needs. Will follow-mjo    The Plan for Transition of Care is related to the following treatment goals: home     The Patient and/or patient representative pt was provided with a choice of provider and agrees   with the discharge plan. [x] Yes [] No    Freedom of choice list was provided with basic dialogue that supports the patient's individualized plan of care/goals, treatment preferences and shares the quality data associated with the providers.  [x] Yes [] No

## 2022-11-02 NOTE — PROGRESS NOTES
Attending Physician Progress Note     Current Meds:  HYDROcodone-acetaminophen (NORCO) 5-325 MG per tablet 1 tablet, Q6H PRN  scopolamine (TRANSDERM-SCOP) transdermal patch 1 patch, Once  gabapentin (NEURONTIN) capsule 300 mg, Nightly  LORazepam (ATIVAN) tablet 0.5 mg, TID  spironolactone (ALDACTONE) tablet 25 mg, Daily  torsemide (DEMADEX) tablet 20 mg, Daily  promethazine (PHENERGAN) tablet 25 mg, Q8H PRN  phenelzine (NARDIL) tablet 15 mg  (Patient Supplied), TID  sodium chloride flush 0.9 % injection 10 mL, 2 times per day  sodium chloride flush 0.9 % injection 10 mL, PRN  0.9 % sodium chloride infusion, PRN  ondansetron (ZOFRAN-ODT) disintegrating tablet 4 mg, Q8H PRN   Or  ondansetron (ZOFRAN) injection 4 mg, Q6H PRN  acetaminophen (TYLENOL) tablet 650 mg, Q6H  ketorolac (TORADOL) injection 15 mg, Q6H  HYDROmorphone (DILAUDID) injection 0.5 mg, Q3H PRN  pantoprazole (PROTONIX) tablet 40 mg, Daily  heparin (porcine) injection 5,000 Units, 3 times per day  insulin lispro (HUMALOG) injection vial 0-8 Units, TID WC  insulin lispro (HUMALOG) injection vial 0-4 Units, Nightly  glucose chewable tablet 16 g, PRN  dextrose bolus 10% 125 mL, PRN   Or  dextrose bolus 10% 250 mL, PRN  glucagon (rDNA) injection 1 mg, PRN  dextrose 10 % infusion, Continuous PRN  melatonin tablet 9 mg, Nightly  albuterol (PROVENTIL) nebulizer solution 2.5 mg, Q6H PRN  Arformoterol Tartrate (BROVANA) nebulizer solution 15 mcg, BID   And  budesonide (PULMICORT) nebulizer suspension 250 mcg, BID   And  ipratropium (ATROVENT) 0.02 % nebulizer solution 0.5 mg, 4x daily  albuterol (PROVENTIL) nebulizer solution 2.5 mg, BID         Vitals/Labs:    Patient Vitals for the past 24 hrs:   BP Temp Temp src Pulse Resp SpO2   11/02/22 0300 (!) 128/51 98.2 °F (36.8 °C) Oral 80 16 92 %   11/01/22 2330 (!) 147/94 98.4 °F (36.9 °C) Oral 82 16 96 %   11/01/22 1926 (!) 149/65 97.5 °F (36.4 °C) Oral 81 16 98 %   11/01/22 1411 -- -- -- -- 16 --   11/01/22 1341 -- -- -- -- 16 --   11/01/22 1137 138/72 97 °F (36.1 °C) Oral 86 -- 95 %   11/01/22 1115 (!) 145/70 97.8 °F (36.6 °C) Oral 88 -- --   11/01/22 1100 (!) 151/55 -- -- 80 16 97 %   11/01/22 1045 (!) 152/54 97.4 °F (36.3 °C) Temporal 84 19 97 %   11/01/22 1034 -- -- -- -- 16 --   11/01/22 1030 (!) 148/54 -- -- 83 18 98 %   11/01/22 1015 (!) 136/53 97.4 °F (36.3 °C) Temporal 77 16 100 %   11/01/22 1005 -- -- -- -- 18 --   11/01/22 1000 (!) 163/50 -- -- 80 20 100 %   11/01/22 0949 (!) 176/66 97.3 °F (36.3 °C) Temporal 90 22 100 %   11/01/22 0945 (!) 176/66 97.3 °F (36.3 °C) Temporal 90 18 100 %        I/O last 3 completed shifts: In: 1100 [I.V.:1000; IV Piggyback:100]  Out: 295 [Urine:275; Blood:20]  No intake/output data recorded. Recent Labs     11/02/22  0306   WBC 11.5   HGB 10.0*   HCT 30.2*        Recent Labs     11/02/22  0306   CREATININE 1.3*   BUN 16      K 3.4*   CL 98   CO2 27     No results for input(s): AST, ALT, ALB, BILITOT, ALKPHOS in the last 72 hours. No results for input(s): LIPASE, AMYLASE in the last 72 hours. No results for input(s): LACTATE in the last 72 hours. No results for input(s): INR, PTT in the last 72 hours. Invalid input(s): PT    Patient is feeling good  Pain under control  Passed some gas    Physical Exam:    Abdomen:    soft and non distended.    appropriate tenderness without guarding    Incision:   Intact without erythema or drainage    Impression:  POD#1 laparoscopic sigmoid colectomy    Plan:  Stop fluids  Ambulate  Advance to full liquids  Lemus out      Electronically by Brittny Martins MD, MD on 11/2/2022 at 6:27 AM

## 2022-11-02 NOTE — PROGRESS NOTES
Patient ambulated down liu and back with minimal assistance from staff and no new complaints of pain.

## 2022-11-02 NOTE — PROGRESS NOTES
Lemus cath removed, pt DTV 1230PM-230PM. Electronically signed by Snow Phillips RN on 11/2/2022 at 6:40 AM

## 2022-11-03 LAB
ANION GAP SERPL CALCULATED.3IONS-SCNC: 12 MMOL/L (ref 7–16)
BASOPHILS ABSOLUTE: 0.03 E9/L (ref 0–0.2)
BASOPHILS RELATIVE PERCENT: 0.3 % (ref 0–2)
BUN BLDV-MCNC: 18 MG/DL (ref 6–23)
CALCIUM SERPL-MCNC: 9.4 MG/DL (ref 8.6–10.2)
CHLORIDE BLD-SCNC: 99 MMOL/L (ref 98–107)
CO2: 27 MMOL/L (ref 22–29)
CREAT SERPL-MCNC: 1.5 MG/DL (ref 0.5–1)
EOSINOPHILS ABSOLUTE: 0.1 E9/L (ref 0.05–0.5)
EOSINOPHILS RELATIVE PERCENT: 1 % (ref 0–6)
GFR SERPL CREATININE-BSD FRML MDRD: 37 ML/MIN/1.73
GLUCOSE BLD-MCNC: 126 MG/DL (ref 74–99)
HCT VFR BLD CALC: 30.5 % (ref 34–48)
HEMOGLOBIN: 10.1 G/DL (ref 11.5–15.5)
IMMATURE GRANULOCYTES #: 0.07 E9/L
IMMATURE GRANULOCYTES %: 0.7 % (ref 0–5)
LYMPHOCYTES ABSOLUTE: 2.53 E9/L (ref 1.5–4)
LYMPHOCYTES RELATIVE PERCENT: 24.8 % (ref 20–42)
MAGNESIUM: 1.7 MG/DL (ref 1.6–2.6)
MCH RBC QN AUTO: 29.3 PG (ref 26–35)
MCHC RBC AUTO-ENTMCNC: 33.1 % (ref 32–34.5)
MCV RBC AUTO: 88.4 FL (ref 80–99.9)
METER GLUCOSE: 120 MG/DL (ref 74–99)
METER GLUCOSE: 156 MG/DL (ref 74–99)
METER GLUCOSE: 184 MG/DL (ref 74–99)
METER GLUCOSE: 237 MG/DL (ref 74–99)
MONOCYTES ABSOLUTE: 0.63 E9/L (ref 0.1–0.95)
MONOCYTES RELATIVE PERCENT: 6.2 % (ref 2–12)
NEUTROPHILS ABSOLUTE: 6.86 E9/L (ref 1.8–7.3)
NEUTROPHILS RELATIVE PERCENT: 67 % (ref 43–80)
PDW BLD-RTO: 12.7 FL (ref 11.5–15)
PLATELET # BLD: 261 E9/L (ref 130–450)
PMV BLD AUTO: 9.4 FL (ref 7–12)
POTASSIUM REFLEX MAGNESIUM: 3.1 MMOL/L (ref 3.5–5)
RBC # BLD: 3.45 E12/L (ref 3.5–5.5)
SODIUM BLD-SCNC: 138 MMOL/L (ref 132–146)
WBC # BLD: 10.2 E9/L (ref 4.5–11.5)

## 2022-11-03 PROCEDURE — 6370000000 HC RX 637 (ALT 250 FOR IP): Performed by: SURGERY

## 2022-11-03 PROCEDURE — 85025 COMPLETE CBC W/AUTO DIFF WBC: CPT

## 2022-11-03 PROCEDURE — 94640 AIRWAY INHALATION TREATMENT: CPT

## 2022-11-03 PROCEDURE — 1200000000 HC SEMI PRIVATE

## 2022-11-03 PROCEDURE — 2580000003 HC RX 258: Performed by: SURGERY

## 2022-11-03 PROCEDURE — 82962 GLUCOSE BLOOD TEST: CPT

## 2022-11-03 PROCEDURE — 6360000002 HC RX W HCPCS: Performed by: SURGERY

## 2022-11-03 PROCEDURE — 36415 COLL VENOUS BLD VENIPUNCTURE: CPT

## 2022-11-03 PROCEDURE — 83735 ASSAY OF MAGNESIUM: CPT

## 2022-11-03 PROCEDURE — 80048 BASIC METABOLIC PNL TOTAL CA: CPT

## 2022-11-03 RX ORDER — POTASSIUM CHLORIDE 20 MEQ/1
40 TABLET, EXTENDED RELEASE ORAL ONCE
Status: COMPLETED | OUTPATIENT
Start: 2022-11-03 | End: 2022-11-03

## 2022-11-03 RX ORDER — HYDRALAZINE HYDROCHLORIDE 20 MG/ML
10 INJECTION INTRAMUSCULAR; INTRAVENOUS EVERY 6 HOURS PRN
Status: DISCONTINUED | OUTPATIENT
Start: 2022-11-03 | End: 2022-11-04 | Stop reason: HOSPADM

## 2022-11-03 RX ADMIN — HEPARIN SODIUM 5000 UNITS: 10000 INJECTION INTRAVENOUS; SUBCUTANEOUS at 05:52

## 2022-11-03 RX ADMIN — ARFORMOTEROL TARTRATE 15 MCG: 15 SOLUTION RESPIRATORY (INHALATION) at 19:30

## 2022-11-03 RX ADMIN — ACETAMINOPHEN 650 MG: 325 TABLET ORAL at 10:26

## 2022-11-03 RX ADMIN — IPRATROPIUM BROMIDE 0.5 MG: 0.5 SOLUTION RESPIRATORY (INHALATION) at 19:30

## 2022-11-03 RX ADMIN — HYDROCODONE BITARTRATE AND ACETAMINOPHEN 1 TABLET: 5; 325 TABLET ORAL at 08:09

## 2022-11-03 RX ADMIN — GABAPENTIN 300 MG: 300 CAPSULE ORAL at 21:02

## 2022-11-03 RX ADMIN — HYDROCODONE BITARTRATE AND ACETAMINOPHEN 1 TABLET: 5; 325 TABLET ORAL at 16:07

## 2022-11-03 RX ADMIN — PANTOPRAZOLE SODIUM 40 MG: 40 TABLET, DELAYED RELEASE ORAL at 08:09

## 2022-11-03 RX ADMIN — LORAZEPAM 0.5 MG: 0.5 TABLET ORAL at 16:16

## 2022-11-03 RX ADMIN — KETOROLAC TROMETHAMINE 15 MG: 30 INJECTION, SOLUTION INTRAMUSCULAR; INTRAVENOUS at 10:27

## 2022-11-03 RX ADMIN — HYDROCODONE BITARTRATE AND ACETAMINOPHEN 1 TABLET: 5; 325 TABLET ORAL at 22:49

## 2022-11-03 RX ADMIN — HEPARIN SODIUM 5000 UNITS: 10000 INJECTION INTRAVENOUS; SUBCUTANEOUS at 16:10

## 2022-11-03 RX ADMIN — PHENELZINE SULFATE 15 MG: 15 TABLET ORAL at 21:05

## 2022-11-03 RX ADMIN — ACETAMINOPHEN 650 MG: 325 TABLET ORAL at 02:08

## 2022-11-03 RX ADMIN — KETOROLAC TROMETHAMINE 15 MG: 30 INJECTION, SOLUTION INTRAMUSCULAR; INTRAVENOUS at 18:16

## 2022-11-03 RX ADMIN — PHENELZINE SULFATE 15 MG: 15 TABLET ORAL at 16:07

## 2022-11-03 RX ADMIN — BUDESONIDE 250 MCG: 0.25 SUSPENSION RESPIRATORY (INHALATION) at 19:30

## 2022-11-03 RX ADMIN — IPRATROPIUM BROMIDE 0.5 MG: 0.5 SOLUTION RESPIRATORY (INHALATION) at 07:48

## 2022-11-03 RX ADMIN — HEPARIN SODIUM 5000 UNITS: 10000 INJECTION INTRAVENOUS; SUBCUTANEOUS at 22:49

## 2022-11-03 RX ADMIN — LORAZEPAM 0.5 MG: 0.5 TABLET ORAL at 08:09

## 2022-11-03 RX ADMIN — IPRATROPIUM BROMIDE 0.5 MG: 0.5 SOLUTION RESPIRATORY (INHALATION) at 15:51

## 2022-11-03 RX ADMIN — HYDROCODONE BITARTRATE AND ACETAMINOPHEN 1 TABLET: 5; 325 TABLET ORAL at 02:11

## 2022-11-03 RX ADMIN — ARFORMOTEROL TARTRATE 15 MCG: 15 SOLUTION RESPIRATORY (INHALATION) at 07:47

## 2022-11-03 RX ADMIN — ALBUTEROL SULFATE 2.5 MG: 2.5 SOLUTION RESPIRATORY (INHALATION) at 07:47

## 2022-11-03 RX ADMIN — SODIUM CHLORIDE, PRESERVATIVE FREE 10 ML: 5 INJECTION INTRAVENOUS at 02:13

## 2022-11-03 RX ADMIN — KETOROLAC TROMETHAMINE 15 MG: 30 INJECTION, SOLUTION INTRAMUSCULAR; INTRAVENOUS at 02:11

## 2022-11-03 RX ADMIN — TORSEMIDE 20 MG: 20 TABLET ORAL at 16:06

## 2022-11-03 RX ADMIN — SODIUM CHLORIDE, PRESERVATIVE FREE 10 ML: 5 INJECTION INTRAVENOUS at 21:00

## 2022-11-03 RX ADMIN — SODIUM CHLORIDE, PRESERVATIVE FREE 10 ML: 5 INJECTION INTRAVENOUS at 10:27

## 2022-11-03 RX ADMIN — SPIRONOLACTONE 25 MG: 25 TABLET ORAL at 16:07

## 2022-11-03 RX ADMIN — POTASSIUM CHLORIDE 40 MEQ: 1500 TABLET, EXTENDED RELEASE ORAL at 06:40

## 2022-11-03 RX ADMIN — BUDESONIDE 250 MCG: 0.25 SUSPENSION RESPIRATORY (INHALATION) at 07:47

## 2022-11-03 RX ADMIN — ALBUTEROL SULFATE 2.5 MG: 2.5 SOLUTION RESPIRATORY (INHALATION) at 19:30

## 2022-11-03 RX ADMIN — Medication 9 MG: at 21:02

## 2022-11-03 ASSESSMENT — PAIN SCALES - GENERAL
PAINLEVEL_OUTOF10: 8
PAINLEVEL_OUTOF10: 6
PAINLEVEL_OUTOF10: 5
PAINLEVEL_OUTOF10: 6
PAINLEVEL_OUTOF10: 6
PAINLEVEL_OUTOF10: 7
PAINLEVEL_OUTOF10: 5

## 2022-11-03 ASSESSMENT — PAIN DESCRIPTION - LOCATION
LOCATION: ABDOMEN;BACK
LOCATION: BACK;NECK;SHOULDER
LOCATION: ABDOMEN;BACK
LOCATION: ABDOMEN
LOCATION: ABDOMEN

## 2022-11-03 ASSESSMENT — PAIN DESCRIPTION - FREQUENCY
FREQUENCY: CONTINUOUS

## 2022-11-03 ASSESSMENT — PAIN DESCRIPTION - PAIN TYPE
TYPE: SURGICAL PAIN;CHRONIC PAIN
TYPE: SURGICAL PAIN

## 2022-11-03 ASSESSMENT — PAIN DESCRIPTION - DESCRIPTORS
DESCRIPTORS: ACHING;DISCOMFORT
DESCRIPTORS: ACHING;DISCOMFORT;SORE
DESCRIPTORS: ACHING;DISCOMFORT

## 2022-11-03 ASSESSMENT — PAIN DESCRIPTION - ONSET
ONSET: ON-GOING

## 2022-11-03 ASSESSMENT — PAIN DESCRIPTION - ORIENTATION
ORIENTATION: RIGHT;LEFT
ORIENTATION: RIGHT;LEFT

## 2022-11-03 NOTE — CARE COORDINATION
Updated plan of care. POD#2. Advance to regular diet. Plan is home. Pt has home o2 at .  No needs for home-mjo

## 2022-11-03 NOTE — PROGRESS NOTES
Surgical residents notified that patient had very small brown BM, unwitnessed. Pt instructed to call after next occurrence.

## 2022-11-03 NOTE — PLAN OF CARE
Problem: Chronic Conditions and Co-morbidities  Goal: Patient's chronic conditions and co-morbidity symptoms are monitored and maintained or improved  11/3/2022 1054 by Maritza Clark RN  Outcome: Progressing     Problem: Discharge Planning  Goal: Discharge to home or other facility with appropriate resources  11/3/2022 1054 by Maritza Clark RN  Outcome: Progressing     Problem: Pain  Goal: Verbalizes/displays adequate comfort level or baseline comfort level  11/3/2022 1054 by Maritza Clark RN  Outcome: Progressing     Problem: ABCDS Injury Assessment  Goal: Absence of physical injury  11/3/2022 1054 by Maritza Clark RN  Outcome: Progressing     Problem: Safety - Adult  Goal: Free from fall injury  11/3/2022 1054 by Maritza Clark RN  Outcome: Progressing

## 2022-11-03 NOTE — PROGRESS NOTES
Attending Physician Progress Note     Current Meds:  potassium chloride (KLOR-CON M) extended release tablet 40 mEq, Once  hydrALAZINE (APRESOLINE) injection 10 mg, Q6H PRN  HYDROcodone-acetaminophen (NORCO) 5-325 MG per tablet 1 tablet, Q6H PRN  scopolamine (TRANSDERM-SCOP) transdermal patch 1 patch, Once  gabapentin (NEURONTIN) capsule 300 mg, Nightly  LORazepam (ATIVAN) tablet 0.5 mg, TID  spironolactone (ALDACTONE) tablet 25 mg, Daily  torsemide (DEMADEX) tablet 20 mg, Daily  promethazine (PHENERGAN) tablet 25 mg, Q8H PRN  phenelzine (NARDIL) tablet 15 mg  (Patient Supplied), TID  sodium chloride flush 0.9 % injection 10 mL, 2 times per day  sodium chloride flush 0.9 % injection 10 mL, PRN  0.9 % sodium chloride infusion, PRN  ondansetron (ZOFRAN-ODT) disintegrating tablet 4 mg, Q8H PRN   Or  ondansetron (ZOFRAN) injection 4 mg, Q6H PRN  acetaminophen (TYLENOL) tablet 650 mg, Q6H  ketorolac (TORADOL) injection 15 mg, Q6H  HYDROmorphone (DILAUDID) injection 0.5 mg, Q3H PRN  pantoprazole (PROTONIX) tablet 40 mg, Daily  heparin (porcine) injection 5,000 Units, 3 times per day  insulin lispro (HUMALOG) injection vial 0-8 Units, TID WC  insulin lispro (HUMALOG) injection vial 0-4 Units, Nightly  glucose chewable tablet 16 g, PRN  dextrose bolus 10% 125 mL, PRN   Or  dextrose bolus 10% 250 mL, PRN  glucagon (rDNA) injection 1 mg, PRN  dextrose 10 % infusion, Continuous PRN  melatonin tablet 9 mg, Nightly  albuterol (PROVENTIL) nebulizer solution 2.5 mg, Q6H PRN  Arformoterol Tartrate (BROVANA) nebulizer solution 15 mcg, BID   And  budesonide (PULMICORT) nebulizer suspension 250 mcg, BID   And  ipratropium (ATROVENT) 0.02 % nebulizer solution 0.5 mg, 4x daily  albuterol (PROVENTIL) nebulizer solution 2.5 mg, BID         Vitals/Labs:    Patient Vitals for the past 24 hrs:   BP Temp Temp src Pulse Resp SpO2   11/03/22 0443 (!) 163/68 98.2 °F (36.8 °C) -- 70 16 96 %   11/02/22 2332 (!) 176/67 97.9 °F (36.6 °C) Oral 88 16 98 %   11/02/22 1920 (!) 170/78 98.4 °F (36.9 °C) Oral 75 20 --   11/02/22 1915 (!) 221/69 98.4 °F (36.9 °C) Oral 75 20 97 %   11/02/22 1745 (!) 170/72 97.8 °F (36.6 °C) Oral 80 16 --   11/02/22 1615 (!) 198/76 -- -- 82 16 --   11/02/22 1430 -- 98 °F (36.7 °C) Oral 86 16 99 %   11/02/22 0748 -- -- -- 90 16 94 %   11/02/22 0725 (!) 147/67 98 °F (36.7 °C) Oral 87 16 96 %        I/O last 3 completed shifts: In: 1100 [I.V.:1000; IV Piggyback:100]  Out: 6106 [UAKRK:5025; Blood:20]  I/O this shift:  In: -   Out: 1400 [Urine:1400]    Recent Labs     11/02/22  0306 11/03/22  0451   WBC 11.5 10.2   HGB 10.0* 10.1*   HCT 30.2* 30.5*    261     Recent Labs     11/02/22  0306 11/03/22  0451   CREATININE 1.3* 1.5*   BUN 16 18    138   K 3.4* 3.1*   CL 98 99   CO2 27 27     No results for input(s): AST, ALT, ALB, BILITOT, ALKPHOS in the last 72 hours. No results for input(s): LIPASE, AMYLASE in the last 72 hours. No results for input(s): LACTATE in the last 72 hours. No results for input(s): INR, PTT in the last 72 hours. Invalid input(s): PT    Patient is feeling fine  Tolerating PO  Passing gas No BM yet    Physical Exam:    Abdomen:    soft and non distended.    appropriate tenderness without guarding    Incision:   Intact without erythema or drainage    Impression:  POD#2    Plan:  Advance to regular diet  Await BM  Continue ambulating  Replace K+  Hydralazine for elevated BP PRN      Electronically by Rut Walker MD, MD on 11/3/2022 at 6:34 AM

## 2022-11-04 VITALS
TEMPERATURE: 97.9 F | WEIGHT: 208.9 LBS | OXYGEN SATURATION: 97 % | HEIGHT: 68 IN | BODY MASS INDEX: 31.66 KG/M2 | DIASTOLIC BLOOD PRESSURE: 84 MMHG | HEART RATE: 92 BPM | RESPIRATION RATE: 18 BRPM | SYSTOLIC BLOOD PRESSURE: 168 MMHG

## 2022-11-04 LAB
ANION GAP SERPL CALCULATED.3IONS-SCNC: 12 MMOL/L (ref 7–16)
BASOPHILS ABSOLUTE: 0.04 E9/L (ref 0–0.2)
BASOPHILS RELATIVE PERCENT: 0.4 % (ref 0–2)
BUN BLDV-MCNC: 20 MG/DL (ref 6–23)
CALCIUM SERPL-MCNC: 9.8 MG/DL (ref 8.6–10.2)
CHLORIDE BLD-SCNC: 100 MMOL/L (ref 98–107)
CO2: 27 MMOL/L (ref 22–29)
CREAT SERPL-MCNC: 1.3 MG/DL (ref 0.5–1)
EOSINOPHILS ABSOLUTE: 0.44 E9/L (ref 0.05–0.5)
EOSINOPHILS RELATIVE PERCENT: 4.4 % (ref 0–6)
GFR SERPL CREATININE-BSD FRML MDRD: 44 ML/MIN/1.73
GLUCOSE BLD-MCNC: 153 MG/DL (ref 74–99)
HCT VFR BLD CALC: 34.6 % (ref 34–48)
HEMOGLOBIN: 11.1 G/DL (ref 11.5–15.5)
IMMATURE GRANULOCYTES #: 0.14 E9/L
IMMATURE GRANULOCYTES %: 1.4 % (ref 0–5)
LYMPHOCYTES ABSOLUTE: 2.69 E9/L (ref 1.5–4)
LYMPHOCYTES RELATIVE PERCENT: 27.1 % (ref 20–42)
MCH RBC QN AUTO: 29.1 PG (ref 26–35)
MCHC RBC AUTO-ENTMCNC: 32.1 % (ref 32–34.5)
MCV RBC AUTO: 90.6 FL (ref 80–99.9)
METER GLUCOSE: 145 MG/DL (ref 74–99)
METER GLUCOSE: 196 MG/DL (ref 74–99)
MONOCYTES ABSOLUTE: 0.66 E9/L (ref 0.1–0.95)
MONOCYTES RELATIVE PERCENT: 6.6 % (ref 2–12)
NEUTROPHILS ABSOLUTE: 5.97 E9/L (ref 1.8–7.3)
NEUTROPHILS RELATIVE PERCENT: 60.1 % (ref 43–80)
PDW BLD-RTO: 12.6 FL (ref 11.5–15)
PLATELET # BLD: 285 E9/L (ref 130–450)
PMV BLD AUTO: 9.2 FL (ref 7–12)
POTASSIUM REFLEX MAGNESIUM: 3.7 MMOL/L (ref 3.5–5)
RBC # BLD: 3.82 E12/L (ref 3.5–5.5)
SODIUM BLD-SCNC: 139 MMOL/L (ref 132–146)
WBC # BLD: 9.9 E9/L (ref 4.5–11.5)

## 2022-11-04 PROCEDURE — 6370000000 HC RX 637 (ALT 250 FOR IP): Performed by: SURGERY

## 2022-11-04 PROCEDURE — 2580000003 HC RX 258: Performed by: SURGERY

## 2022-11-04 PROCEDURE — 6360000002 HC RX W HCPCS: Performed by: SURGERY

## 2022-11-04 PROCEDURE — 85025 COMPLETE CBC W/AUTO DIFF WBC: CPT

## 2022-11-04 PROCEDURE — 36415 COLL VENOUS BLD VENIPUNCTURE: CPT

## 2022-11-04 PROCEDURE — 82962 GLUCOSE BLOOD TEST: CPT

## 2022-11-04 PROCEDURE — 80048 BASIC METABOLIC PNL TOTAL CA: CPT

## 2022-11-04 RX ORDER — HYDROCODONE BITARTRATE AND ACETAMINOPHEN 5; 325 MG/1; MG/1
1 TABLET ORAL EVERY 6 HOURS PRN
Qty: 15 TABLET | Refills: 0 | Status: SHIPPED | OUTPATIENT
Start: 2022-11-04 | End: 2022-11-09

## 2022-11-04 RX ADMIN — ONDANSETRON 4 MG: 4 TABLET, ORALLY DISINTEGRATING ORAL at 06:33

## 2022-11-04 RX ADMIN — IPRATROPIUM BROMIDE 0.5 MG: 0.5 SOLUTION RESPIRATORY (INHALATION) at 09:49

## 2022-11-04 RX ADMIN — PHENELZINE SULFATE 15 MG: 15 TABLET ORAL at 09:30

## 2022-11-04 RX ADMIN — HEPARIN SODIUM 5000 UNITS: 10000 INJECTION INTRAVENOUS; SUBCUTANEOUS at 06:45

## 2022-11-04 RX ADMIN — LORAZEPAM 0.5 MG: 0.5 TABLET ORAL at 09:30

## 2022-11-04 RX ADMIN — ARFORMOTEROL TARTRATE 15 MCG: 15 SOLUTION RESPIRATORY (INHALATION) at 09:50

## 2022-11-04 RX ADMIN — HYDRALAZINE HYDROCHLORIDE 10 MG: 20 INJECTION INTRAMUSCULAR; INTRAVENOUS at 12:24

## 2022-11-04 RX ADMIN — ALBUTEROL SULFATE 2.5 MG: 2.5 SOLUTION RESPIRATORY (INHALATION) at 09:50

## 2022-11-04 RX ADMIN — PANTOPRAZOLE SODIUM 40 MG: 40 TABLET, DELAYED RELEASE ORAL at 09:28

## 2022-11-04 RX ADMIN — HYDRALAZINE HYDROCHLORIDE 10 MG: 20 INJECTION INTRAMUSCULAR; INTRAVENOUS at 06:35

## 2022-11-04 RX ADMIN — KETOROLAC TROMETHAMINE 15 MG: 30 INJECTION, SOLUTION INTRAMUSCULAR; INTRAVENOUS at 08:19

## 2022-11-04 RX ADMIN — ACETAMINOPHEN 650 MG: 325 TABLET ORAL at 09:27

## 2022-11-04 RX ADMIN — SODIUM CHLORIDE, PRESERVATIVE FREE 10 ML: 5 INJECTION INTRAVENOUS at 09:31

## 2022-11-04 RX ADMIN — KETOROLAC TROMETHAMINE 15 MG: 30 INJECTION, SOLUTION INTRAMUSCULAR; INTRAVENOUS at 01:21

## 2022-11-04 RX ADMIN — BUDESONIDE 250 MCG: 0.25 SUSPENSION RESPIRATORY (INHALATION) at 09:50

## 2022-11-04 ASSESSMENT — PAIN DESCRIPTION - FREQUENCY: FREQUENCY: CONTINUOUS

## 2022-11-04 ASSESSMENT — PAIN DESCRIPTION - ORIENTATION: ORIENTATION: RIGHT;LEFT

## 2022-11-04 ASSESSMENT — PAIN DESCRIPTION - LOCATION
LOCATION: ABDOMEN;BACK
LOCATION: ABDOMEN;BACK
LOCATION: ABDOMEN

## 2022-11-04 ASSESSMENT — PAIN SCALES - GENERAL
PAINLEVEL_OUTOF10: 4
PAINLEVEL_OUTOF10: 5

## 2022-11-04 ASSESSMENT — PAIN DESCRIPTION - PAIN TYPE: TYPE: SURGICAL PAIN

## 2022-11-04 ASSESSMENT — PAIN DESCRIPTION - DESCRIPTORS: DESCRIPTORS: ACHING;DISCOMFORT

## 2022-11-04 ASSESSMENT — PAIN - FUNCTIONAL ASSESSMENT: PAIN_FUNCTIONAL_ASSESSMENT: PREVENTS OR INTERFERES SOME ACTIVE ACTIVITIES AND ADLS

## 2022-11-04 ASSESSMENT — PAIN DESCRIPTION - ONSET: ONSET: ON-GOING

## 2022-11-04 NOTE — CARE COORDINATION
Updated plan of care. POD#3. Regular diet, regular diet. Ambulating.  Pt has home o2 at  through [address] [facility] for possible discharge today-[facility] [place]

## 2022-11-04 NOTE — DISCHARGE SUMMARY
Patient ID:  Aria Medeiros  [id number]  45 y.o.  1950    Admit date: 11/1/2022    Discharge date and time:  11/4/2022, 12:32 PM      Admitting Physician: Pio Araujo MD     Discharge Physician: Pio Araujo MD     Admission Diagnoses:   Intraoperative partial intestinal obstruction ([address] [K91.81, K56.690]  Constipation, unspecified constipation type [K59.00]  Other partial intestinal obstruction ([address] [K56.690]    Discharged Condition: stable    Hospital Course: admitted following laparoscopic sigmoid colectomy; no perioperative issues    Disposition: home    Discharge Medications:     Medication List        CHANGE how you take these medications      HYDROcodone-acetaminophen 5-325 MG per tablet  Commonly known as: [address]  Take 1 tablet by mouth every 6 hours as needed for Pain for up to 5 days. What changed:   when to take this  reasons to take this            CONTINUE taking these medications      albuterol sulfate  (90 Base) MCG/ACT inhaler  Commonly known as: PROVENTIL;VENTOLIN;PROAIR     ammonium lactate 12 % lotion  Commonly known as: Lac-Hydrin  Apply topically daily.      Breztri Aerosphere 160-9-4.8 MCG/ACT Aero  Generic drug: Budeson-Glycopyrrol-Formoterol     gabapentin 300 MG capsule  Commonly known as: NEURONTIN     glipiZIDE 5 MG tablet  Commonly known as: GLUCOTROL     ipratropium-albuterol 0.5-2.5 (3) MG/3ML Soln nebulizer solution  Commonly known as: DUONEB     LORazepam 0.5 MG tablet  Commonly known as: ATIVAN     Melatonin 10 MG Tabs     MIRALAX PO     omeprazole 40 MG delayed release capsule  Commonly known as: PRILOSEC     OXYGEN     phenelzine 15 MG tablet  Commonly known as: NARDIL  Take 1 tablet by mouth 3 times daily     promethazine 25 MG tablet  Commonly known as: PHENERGAN     Roflumilast 500 MCG tablet  Commonly known as: DALIRESP     spironolactone 25 MG tablet  Commonly known as: ALDACTONE     torsemide 20 MG tablet  Commonly known as: DEMADEX     vitamin B-12 500 MCG tablet  Commonly known as: CYANOCOBALAMIN     VITAMIN D-3 PO            ASK your doctor about these medications      benazepril 10 MG tablet  Commonly known as: LOTENSIN  Take 1 tablet by mouth nightly               Where to Get Your Medications        These medications were sent to [facility], [address] [place] [address]      Phone: [phone]   HYDROcodone-acetaminophen 5-325 MG per tablet           Activity: no heavy lifting for 4 weeks    Diet: regular diet    Wound Care: keep wound clean and dry    Follow-up with Dr. Howard Lyons scheduled     Electronically signed by Carlos Retana MD  on 11/4/2022 at 12:32 PM

## 2022-11-04 NOTE — PROGRESS NOTES
GENERAL SURGERY  DAILY PROGRESS NOTE  11/4/2022    CHIEF COMPLAINT:  No chief complaint on file. SUBJECTIVE:  Feeling ok this morning, she is feeling nauseated. She is passing gas and had a bowel movement yesterday. OBJECTIVE:  BP (!) 162/66   Pulse 77   Temp 97.8 °F (36.6 °C) (Oral)   Resp 18   Ht 5' 8\" (1.727 m)   Wt 208 lb 14.4 oz (94.8 kg)   SpO2 92%   BMI 31.76 kg/m²     GENERAL:  NAD. A&Ox3. HEENT: pupils equal  LUNGS:  on room air. No acute respiratory distress  CARDIOVASCULAR: hemodynamically stable  SKIN: warm and dry, incisions C/D/I  ABDOMEN:  Soft, non-distended, appropriately tender. No guarding, rigidity, rebound.     ASSESSMENT/PLAN:  67 y.o. female s/p lap sigmoid colectomy    Plan  -continue diet as tolerated  -continue as needed pain and nausea control  -continue ambulating   -possible discharge later this morning     Will DW Dr. Molli Fleischer, MD  Surgery Resident PGY-2  11/4/2022  6:58 AM

## 2022-11-07 NOTE — PROGRESS NOTES
Physician Progress Note      PATIENTLorandria Ortega  CSN #:                  [id number]  :                       1950  ADMIT DATE:       2022 5:44 AM  DISCH DATE:        2022 1:25 PM  RESPONDING  PROVIDER #:        Murray Russo          QUERY TEXT:    Pt admitted for sigmoid colectomy . Pt noted to have drop in H&H. If possible,   please document in the progress notes and discharge summary if you are   evaluating and/or treating any of the following: The medical record reflects the following:  Risk Factors: sigmoid colectomy  Clinical Indicators: 10/27 H&H 13.1/4.4,  H&H 10.0/30.2, and per Op note   \". Melissa Lan Estimated Blood Loss (mL): Minimal.. Melissa Lan \"  Treatment: serial lab monitoring    Thank you,  Camden ALMENDAREZ, RN, CDIS  Clinical Documentation Improvement  [email or web address]  Options provided:  -- Postoperative acute blood loss anemia  -- Dilutional anemia  -- Other - I will add my own diagnosis  -- Disagree - Not applicable / Not valid  -- Disagree - Clinically unable to determine / Unknown  -- Refer to Clinical Documentation Reviewer    PROVIDER RESPONSE TEXT:    Anemia is due to dilution.     Query created by: Wilmar Trujillo on 2022 10:27 AM      Electronically signed by:  Murray Russo 2022 10:24 AM

## 2022-11-07 NOTE — PROGRESS NOTES
CLINICAL PHARMACY NOTE: MEDS TO BEDS    Total # of Prescriptions Filled: 1   The following medications were delivered to the patient:  [address] [facility]    Additional Documentation:

## 2022-11-11 NOTE — H&P
History and Physical      Chief Complaint: Sigmoid stricture    HPI  67 y.o. female who has significant issues with constipation. Sigmoid colon significantly narrowed on endoscopy. See previous H&P      Past Medical History:   Diagnosis Date    Arthritis     Chronic pain     COPD (chronic obstructive pulmonary disease) ([address]     Depression     Diabetes ([address]     History of left shoulder fracture     Hx of blood clots     Patient reports \"blood clots in my feet\" about 18 months ago    Hypertension     Neuropathy     Pacemaker     last interrogation 22 Epic cardiology    PONV (postoperative nausea and vomiting)     Pulmonary hypertension ([address]     Sinus arrest     SSS (sick sinus syndrome) ([address]     Syncope and collapse        Past Surgical History:   Procedure Laterality Date    APPENDECTOMY      ARM SURGERY Left 2022    EXCISION LEFT UPPER ARM LIPOMA performed by Sandra Robert MD at [address] [place] [address]  2016    [employer]     SECTION      x3    CHOLECYSTECTOMY, LAPAROSCOPIC N/A 2022    LAPAROSCOPIC CHOLECYSTECTOMY POSSIBLE OPEN POSSIBLE GRAM performed by Sandra Robert MD at [place] [address] 2022    LAPAROSCOPIC SIGMOID COLON RESECTION performed by Sandra Robert MD at [address] [place] [address] ([facility])      LEG SURGERY Left 2021    LIPOMA RESECTION  2021    TONSILLECTOMY      TUBAL LIGATION         Medications Prior to Admission:    Prior to Admission medications    Medication Sig Start Date End Date Taking?  Authorizing Provider   glipiZIDE (GLUCOTROL) 5 MG tablet Take 2 mg by mouth 2 times daily (before meals)    Historical Provider, MD   Roflumilast (DALIRESP) 500 MCG tablet Take 500 mcg by mouth at bedtime    Historical Provider, MD   Budeson-Glycopyrrol-Formoterol (BREZTRI AEROSPHERE) 160-9-4.8 MCG/ACT AERO Inhale 2 puffs into the lungs 2 times daily 11/10/21   Historical Provider, MD   ipratropium-albuterol (DUONEB) 0.5-2.5 (3) MG/3ML SOLN nebulizer solution Inhale 3 mLs into the lungs 2 times daily 12/7/21   Historical Provider, MD   LORazepam (ATIVAN) 0.5 MG tablet Take 0.5 mg by mouth 3 times daily. 11/10/21   Historical Provider, MD   spironolactone (ALDACTONE) 25 MG tablet Take 25 mg by mouth daily 12/3/21   Historical Provider, MD   ammonium lactate (LAC-HYDRIN) 12 % lotion Apply topically daily.  10/27/21   Nancy Castro, PREMA   albuterol sulfate HFA (PROVENTIL;VENTOLIN;PROAIR) 108 (90 Base) MCG/ACT inhaler Inhale 2 puffs into the lungs every 6 hours as needed for Wheezing    Historical Provider, MD   omeprazole (PRILOSEC) 40 MG delayed release capsule Take 40 mg by mouth at bedtime    Historical Provider, MD   OXYGEN Inhale 2 L into the lungs nightly as needed    Historical Provider, MD   phenelzine (NARDIL) 15 MG tablet Take 1 tablet by mouth 3 times daily 8/8/18   Naina Gray DO   benazepril (LOTENSIN) 10 MG tablet Take 1 tablet by mouth nightly  Patient not taking: Reported on 11/1/2022 8/8/18   Naina Gray DO   vitamin B-12 (CYANOCOBALAMIN) 500 MCG tablet Take 500 mcg by mouth daily    Historical Provider, MD   promethazine (PHENERGAN) 25 MG tablet Take 25 mg by mouth every 8 hours as needed     Historical Provider, MD   torsemide (DEMADEX) 20 MG tablet Take 20 mg by mouth daily 5/16/17   Historical Provider, MD   Polyethylene Glycol 3350 (MIRALAX PO) Take by mouth as needed    Historical Provider, MD   gabapentin (NEURONTIN) 300 MG capsule Take 300 mg by mouth nightly     Historical Provider, MD   Cholecalciferol (VITAMIN D-3 PO) Take 2,000 Units by mouth nightly     Historical Provider, MD   Melatonin 10 MG TABS Take by mouth nightly     Historical Provider, MD       Allergies   Allergen Reactions    Penicillins Anaphylaxis and Shortness Of Breath    Sulfa Antibiotics Itching and Swelling       Family History   Problem Relation Age of Onset    Alzheimer's Disease Father        Social History     Tobacco Use Smoking status: Former     Packs/day: 1.00     Types: Cigarettes     Start date: 1963     Quit date: 2006     Years since quittin.2    Smokeless tobacco: Never   Vaping Use    Vaping Use: Never used   Substance Use Topics    Alcohol use: Not Currently    Drug use: No       Review of Systems:  General ROS: Negative  Hematological and Lymphatic ROS: Negative  Respiratory ROS: Negative  Cardiovascular ROS: Negative  Gastrointestinal ROS: Negative  Genito-Urinary ROS: Negative  Musculoskeletal ROS: Negative      PHYSICAL EXAM:    Vitals:    22 1100   BP: (!) 168/84   Pulse: 92   Resp: 18   Temp: 97.9 °F (36.6 °C)   SpO2:        General Appearance:  awake, alert, oriented, in no acute distress  Skin:  Skin color, texture, turgor normal. No rashes or lesions. Head/face:  NCAT  Eyes:  No gross abnormalities. Lungs:  Normal expansion. Clear to auscultation. No rales, rhonchi, or wheezing. Heart:  Heart sounds are normal.  Regular rate and rhythm without murmur, gallop or rub. Abdomen: Soft, nondistended, nontender  Extremities: Extremities warm to touch, pink, with no edema. LABS:    CBC  No results for input(s): WBC, HGB, HCT, PLT in the last 72 hours. BMP  No results for input(s): NA, K, CL, CO2, BUN, CREATININE, GLU, CALCIUM in the last 72 hours. Liver Function  No results for input(s): AMYLASE, LIPASE, BILITOT, BILIDIR, AST, ALT, ALKPHOS, PROT, LABALBU in the last 72 hours. No results for input(s): LACTATE in the last 72 hours. No results for input(s): INR, PTT in the last 72 hours. Invalid input(s): PT    RADIOLOGY    No results found.       ASSESSMENT:  67 y.o. female with sigmoid stricture    PLAN:  Laparoscopic sigmoid resection    Electronically signed by Sandra Robert MD on 22 at 4:00 PM EST

## 2023-03-27 ENCOUNTER — PROCEDURE VISIT (OUTPATIENT)
Dept: PODIATRY | Age: 73
End: 2023-03-27
Payer: MEDICARE

## 2023-03-27 VITALS — HEIGHT: 68 IN | WEIGHT: 187 LBS | BODY MASS INDEX: 28.34 KG/M2

## 2023-03-27 DIAGNOSIS — I87.2 VENOUS INSUFFICIENCY (CHRONIC) (PERIPHERAL): ICD-10-CM

## 2023-03-27 DIAGNOSIS — B35.1 ONYCHOMYCOSIS: Primary | ICD-10-CM

## 2023-03-27 DIAGNOSIS — E11.42 DIABETIC POLYNEUROPATHY ASSOCIATED WITH TYPE 2 DIABETES MELLITUS (HCC): ICD-10-CM

## 2023-03-27 DIAGNOSIS — R26.2 DIFFICULTY WALKING: ICD-10-CM

## 2023-03-27 DIAGNOSIS — E11.51 TYPE II DIABETES MELLITUS WITH PERIPHERAL CIRCULATORY DISORDER (HCC): ICD-10-CM

## 2023-03-27 PROCEDURE — 11721 DEBRIDE NAIL 6 OR MORE: CPT | Performed by: PODIATRIST

## 2023-03-27 NOTE — PROGRESS NOTES
Patient in today for nail care. Patient does not have any complaints of pain at this time. Patient's PCP is Elsa Green MD date of last ov 03/20/2023.        Vika Lares LPN
lower extremities. Absence of hair growth is noted to both lower extremities. Edema noted with both varicosities and stasis skin changes present bilaterally. Coolness is noted to the digital regions to palpation. Capillary fill time delayed digital areas bilateral foot. No heel fissuring or macerations of the web spaces. No plantar calluses and/or ulcerative areas are noted. Patient is having difficulty with gait/walking. Plan Per Assessment  Natalie Bishop was seen today for nail problem. Diagnoses and all orders for this visit:    Onychomycosis    Diabetic polyneuropathy associated with type 2 diabetes mellitus ([facility]    Type II diabetes mellitus with peripheral circulatory disorder (HCC)    Venous insufficiency (chronic) (peripheral)    Difficulty walking        1. Evaluation and Management  2. Manual and electrical debridement of the mycotic nails was performed for thickness and length to prevent injection and/or ulceration. 3. Discussed additional diabetic lower extremity care techniques with patient today. 4. We did discuss importance of shoe gear and foot inspection to prevent potential issues. 5. We will see the patient back at a later date for continued podiatric management and care. Patient was advised to call the office with any questions or concerns prior to their next appointment if needed. Seen By:    Sukhdeep Butterfield DPM    Electronically signed by Sukhdeep Butterfield DPM on 3/27/2023 at 12:22 PM      This note was created using voice recognition software. The note was reviewed however may contain grammatical errors.

## 2023-06-02 ENCOUNTER — PROCEDURE VISIT (OUTPATIENT)
Dept: PODIATRY | Age: 73
End: 2023-06-02

## 2023-06-02 VITALS — WEIGHT: 187 LBS | HEIGHT: 68 IN | BODY MASS INDEX: 28.34 KG/M2

## 2023-06-02 DIAGNOSIS — E11.42 DIABETIC POLYNEUROPATHY ASSOCIATED WITH TYPE 2 DIABETES MELLITUS (HCC): ICD-10-CM

## 2023-06-02 DIAGNOSIS — I87.2 VENOUS INSUFFICIENCY (CHRONIC) (PERIPHERAL): ICD-10-CM

## 2023-06-02 DIAGNOSIS — I73.9 PVD (PERIPHERAL VASCULAR DISEASE) (HCC): ICD-10-CM

## 2023-06-02 DIAGNOSIS — B35.1 ONYCHOMYCOSIS: Primary | ICD-10-CM

## 2023-06-02 DIAGNOSIS — E11.51 TYPE II DIABETES MELLITUS WITH PERIPHERAL CIRCULATORY DISORDER (HCC): ICD-10-CM

## 2023-06-02 DIAGNOSIS — R26.2 DIFFICULTY WALKING: ICD-10-CM

## 2023-06-02 NOTE — PROGRESS NOTES
Patient in today for nail care. Patient does not have any complaints of pain at this time. Patient is on AB for cellulitis right 2nd.  Patient's PCP is Lorie Mccoy MD date of last ov  5/15/2023       Georgie Sawyer LPN
both lower extremities. Minimal hair growth is noted to both lower extremities. Edema noted with both varicosities and stasis skin changes present bilaterally. Coolness is noted to the digital regions to palpation. Capillary fill time delayed digital areas bilateral foot. No heel fissuring or macerations of the web spaces. No plantar calluses and/or ulcerative areas are noted. Patient is having difficulty with gait/walking. Plan Per Assessment  Tamika Sherwood was seen today for nail problem and diabetes. Diagnoses and all orders for this visit:    Onychomycosis    Diabetic polyneuropathy associated with type 2 diabetes mellitus ([facility]    Type II diabetes mellitus with peripheral circulatory disorder (HCC)    PVD (peripheral vascular disease) (HCC)    Venous insufficiency (chronic) (peripheral)    Difficulty walking        1. Evaluation and Management  2. Manual and electrical debridement of the mycotic nails was performed for thickness and length to prevent injection and/or ulceration. 3. Discussed additional diabetic lower extremity care techniques with patient today. 4. We did discuss importance of shoe gear and foot inspection to prevent potential issues. 5. We will see the patient back at a later date for continued podiatric management and care. Patient was advised to call the office with any questions or concerns prior to their next appointment if needed. Seen By:    Piedad Sousa DPM    Electronically signed by Piedad Sousa DPM on 6/2/2023 at 12:11 PM      This note was created using voice recognition software. The note was reviewed however may contain grammatical errors.

## 2023-08-02 ENCOUNTER — APPOINTMENT (OUTPATIENT)
Dept: GENERAL RADIOLOGY | Age: 73
End: 2023-08-02
Payer: MEDICARE

## 2023-08-02 ENCOUNTER — HOSPITAL ENCOUNTER (EMERGENCY)
Age: 73
Discharge: HOME | End: 2023-08-02
Attending: EMERGENCY MEDICINE
Payer: MEDICARE

## 2023-08-02 VITALS
HEIGHT: 68 IN | OXYGEN SATURATION: 95 % | SYSTOLIC BLOOD PRESSURE: 141 MMHG | WEIGHT: 192 LBS | TEMPERATURE: 97.9 F | BODY MASS INDEX: 29.1 KG/M2 | HEART RATE: 80 BPM | RESPIRATION RATE: 17 BRPM | DIASTOLIC BLOOD PRESSURE: 71 MMHG

## 2023-08-02 DIAGNOSIS — R06.02 SHORTNESS OF BREATH: Primary | ICD-10-CM

## 2023-08-02 LAB
ALBUMIN SERPL-MCNC: 4.5 G/DL (ref 3.5–5.2)
ALP SERPL-CCNC: 111 U/L (ref 35–104)
ALT SERPL-CCNC: 19 U/L (ref 0–32)
ANION GAP SERPL CALCULATED.3IONS-SCNC: 12 MMOL/L (ref 7–16)
AST SERPL-CCNC: 19 U/L (ref 0–31)
BASOPHILS # BLD: 0.02 K/UL (ref 0–0.2)
BASOPHILS NFR BLD: 0 % (ref 0–2)
BILIRUB SERPL-MCNC: 0.5 MG/DL (ref 0–1.2)
BNP SERPL-MCNC: 642 PG/ML (ref 0–125)
BUN SERPL-MCNC: 25 MG/DL (ref 6–23)
CALCIUM SERPL-MCNC: 10.1 MG/DL (ref 8.6–10.2)
CHLORIDE SERPL-SCNC: 97 MMOL/L (ref 98–107)
CO2 SERPL-SCNC: 28 MMOL/L (ref 22–29)
CREAT SERPL-MCNC: 1.3 MG/DL (ref 0.5–1)
EKG ATRIAL RATE: 81 BPM
EKG P AXIS: 54 DEGREES
EKG P-R INTERVAL: 154 MS
EKG Q-T INTERVAL: 364 MS
EKG QRS DURATION: 76 MS
EKG QTC CALCULATION (BAZETT): 422 MS
EKG R AXIS: 5 DEGREES
EKG T AXIS: 50 DEGREES
EKG VENTRICULAR RATE: 81 BPM
EOSINOPHIL # BLD: 0.19 K/UL (ref 0.05–0.5)
EOSINOPHILS RELATIVE PERCENT: 2 % (ref 0–6)
ERYTHROCYTE [DISTWIDTH] IN BLOOD BY AUTOMATED COUNT: 11.5 % (ref 11.5–15)
GFR SERPL CREATININE-BSD FRML MDRD: 44 ML/MIN/1.73M2
GLUCOSE SERPL-MCNC: 186 MG/DL (ref 74–99)
HCT VFR BLD AUTO: 41.9 % (ref 34–48)
HGB BLD-MCNC: 13.8 G/DL (ref 11.5–15.5)
IMM GRANULOCYTES # BLD AUTO: 0.04 K/UL (ref 0–0.58)
IMM GRANULOCYTES NFR BLD: 0 % (ref 0–5)
LYMPHOCYTES NFR BLD: 2.02 K/UL (ref 1.5–4)
LYMPHOCYTES RELATIVE PERCENT: 22 % (ref 20–42)
MCH RBC QN AUTO: 28.9 PG (ref 26–35)
MCHC RBC AUTO-ENTMCNC: 32.9 G/DL (ref 32–34.5)
MCV RBC AUTO: 87.7 FL (ref 80–99.9)
MONOCYTES NFR BLD: 0.59 K/UL (ref 0.1–0.95)
MONOCYTES NFR BLD: 6 % (ref 2–12)
NEUTROPHILS NFR BLD: 69 % (ref 43–80)
NEUTS SEG NFR BLD: 6.43 K/UL (ref 1.8–7.3)
PLATELET # BLD AUTO: 274 K/UL (ref 130–450)
PMV BLD AUTO: 9 FL (ref 7–12)
POTASSIUM SERPL-SCNC: 4.1 MMOL/L (ref 3.5–5)
PROT SERPL-MCNC: 7.6 G/DL (ref 6.4–8.3)
RBC # BLD AUTO: 4.78 M/UL (ref 3.5–5.5)
SODIUM SERPL-SCNC: 137 MMOL/L (ref 132–146)
WBC OTHER # BLD: 9.3 K/UL (ref 4.5–11.5)

## 2023-08-02 PROCEDURE — 80053 COMPREHEN METABOLIC PANEL: CPT

## 2023-08-02 PROCEDURE — 85025 COMPLETE CBC W/AUTO DIFF WBC: CPT

## 2023-08-02 PROCEDURE — 93005 ELECTROCARDIOGRAM TRACING: CPT | Performed by: NURSE PRACTITIONER

## 2023-08-02 PROCEDURE — 99285 EMERGENCY DEPT VISIT HI MDM: CPT

## 2023-08-02 PROCEDURE — 94640 AIRWAY INHALATION TREATMENT: CPT

## 2023-08-02 PROCEDURE — 83880 ASSAY OF NATRIURETIC PEPTIDE: CPT

## 2023-08-02 PROCEDURE — 96374 THER/PROPH/DIAG INJ IV PUSH: CPT

## 2023-08-02 PROCEDURE — 93010 ELECTROCARDIOGRAM REPORT: CPT | Performed by: INTERNAL MEDICINE

## 2023-08-02 PROCEDURE — 6370000000 HC RX 637 (ALT 250 FOR IP)

## 2023-08-02 PROCEDURE — 6360000002 HC RX W HCPCS

## 2023-08-02 PROCEDURE — 71046 X-RAY EXAM CHEST 2 VIEWS: CPT

## 2023-08-02 RX ORDER — IPRATROPIUM BROMIDE AND ALBUTEROL SULFATE 2.5; .5 MG/3ML; MG/3ML
1 SOLUTION RESPIRATORY (INHALATION)
Status: DISCONTINUED | OUTPATIENT
Start: 2023-08-02 | End: 2023-08-02 | Stop reason: HOSPADM

## 2023-08-02 RX ORDER — PREDNISONE 20 MG/1
20 TABLET ORAL 2 TIMES DAILY
Qty: 10 TABLET | Refills: 0 | Status: SHIPPED | OUTPATIENT
Start: 2023-08-02 | End: 2023-08-07

## 2023-08-02 RX ADMIN — IPRATROPIUM BROMIDE AND ALBUTEROL SULFATE 1 DOSE: .5; 2.5 SOLUTION RESPIRATORY (INHALATION) at 12:33

## 2023-08-02 RX ADMIN — METHYLPREDNISOLONE SODIUM SUCCINATE 125 MG: 125 INJECTION, POWDER, FOR SOLUTION INTRAMUSCULAR; INTRAVENOUS at 12:02

## 2023-08-02 ASSESSMENT — ENCOUNTER SYMPTOMS
EYES NEGATIVE: 1
ALLERGIC/IMMUNOLOGIC NEGATIVE: 1
GASTROINTESTINAL NEGATIVE: 1
SHORTNESS OF BREATH: 1

## 2023-08-02 ASSESSMENT — PAIN DESCRIPTION - LOCATION: LOCATION: BACK

## 2023-08-02 ASSESSMENT — PAIN DESCRIPTION - ORIENTATION: ORIENTATION: LOWER

## 2023-08-02 ASSESSMENT — LIFESTYLE VARIABLES
HOW MANY STANDARD DRINKS CONTAINING ALCOHOL DO YOU HAVE ON A TYPICAL DAY: PATIENT DOES NOT DRINK
HOW OFTEN DO YOU HAVE A DRINK CONTAINING ALCOHOL: NEVER

## 2023-08-02 ASSESSMENT — PAIN DESCRIPTION - PAIN TYPE: TYPE: CHRONIC PAIN

## 2023-08-02 ASSESSMENT — PAIN DESCRIPTION - FREQUENCY: FREQUENCY: CONTINUOUS

## 2023-08-02 ASSESSMENT — PAIN SCALES - GENERAL: PAINLEVEL_OUTOF10: 5

## 2023-08-02 NOTE — ED NOTES
Reviewed discharge instructions with patient, discussed medications and addressed all patient and family questions/concerns. Pt verbalizes understanding.        Ashley Alvarez RN  08/02/23 [id number]

## 2023-08-02 NOTE — DISCHARGE INSTRUCTIONS
Please follow-up with your pulmonologist at your earliest convenience, follow-up with your PCP at your earliest convenience. Return to ED if symptoms worsen or fail to improve. Take medication as prescribed.

## 2023-08-02 NOTE — ED NOTES
Department of Emergency Medicine  FIRST PROVIDER TRIAGE NOTE             Independent MLP           8/2/23  10:56 AM EDT    Date of Encounter: 8/2/23   MRN: [id number]      HPI: Montserrat Davis is a 67 y.o. female who presents to the ED for sob started last Thursday, history copd. C/o Cough. ROS: Negative for cp. PE: Gen Appearance/Constitutional: alert  Pulm: respirations easy and unlabored    There were no vitals filed for this visit. Past medical history, surgical history, and medications reviewed. Initial Plan of Care: All treatment areas within department are currently occupied. Plan to order/initiate the following while awaiting opening in ED: EKG. Initiate treatment/testing, proceed to treatment area when bed available for ED Attending/MLP to continue care.     Electronically signed by MONIE Somers CNP   DD: 8/2/23          MONIE Somers CNP  08/02/23 1058

## 2023-08-02 NOTE — ED PROVIDER NOTES
[facility]      Pt Name: James Xavier  MRN: [id number]  [facility] 1950  Date of evaluation: 8/2/2023  Provider: Steff Pineda MD  PCP: Elvia Starks MD  Note Started: 11:47 AM EDT 8/2/23    HPI: Patient is a 77-year-old female who presents with several day history of excessive shortness of breath. Patient states that shortness of breath began last Thursday, endoscopic restively worse, to the point where she can only walk a few feet today before feeling shortness of breath. Patient with known history of chronic COPD reports she was taking prednisone last month for 11 days. Patient followed by pulmonologist.  Patient denies headache, dizziness, chest pain, abdominal pain, cramping, pain on deep inspiration, fever, chills, nausea, vomiting, diarrhea, constipation, dysuria. Chief Complaint   Patient presents with    Shortness of Breath     sob, hx of copd       Review of Systems   Constitutional: Negative. HENT: Negative. Eyes: Negative. Respiratory:  Positive for shortness of breath. Cardiovascular: Negative. Gastrointestinal: Negative. Endocrine: Negative. Genitourinary: Negative. Musculoskeletal: Negative. Skin: Negative. Allergic/Immunologic: Negative. Neurological: Negative. Hematological: Negative. Psychiatric/Behavioral: Negative. Physical Exam  Constitutional:       Appearance: She is well-developed. HENT:      Head: Normocephalic and atraumatic. Mouth/Throat:      Mouth: Mucous membranes are moist.   Eyes:      Pupils: Pupils are equal, round, and reactive to light. Cardiovascular:      Rate and Rhythm: Normal rate and regular rhythm. Pulses: Normal pulses. Heart sounds: Normal heart sounds. Pulmonary:      Effort: Pulmonary effort is normal. No tachypnea or respiratory distress. Breath sounds: Normal breath sounds. No stridor.  No

## 2023-09-06 ENCOUNTER — APPOINTMENT (OUTPATIENT)
Dept: GENERAL RADIOLOGY | Age: 73
End: 2023-09-06
Payer: MEDICARE

## 2023-09-06 ENCOUNTER — HOSPITAL ENCOUNTER (EMERGENCY)
Age: 73
Discharge: HOME | End: 2023-09-06
Payer: MEDICARE

## 2023-09-06 VITALS
DIASTOLIC BLOOD PRESSURE: 74 MMHG | HEIGHT: 67 IN | OXYGEN SATURATION: 92 % | WEIGHT: 189 LBS | BODY MASS INDEX: 29.66 KG/M2 | RESPIRATION RATE: 18 BRPM | SYSTOLIC BLOOD PRESSURE: 154 MMHG | HEART RATE: 57 BPM | TEMPERATURE: 98.3 F

## 2023-09-06 DIAGNOSIS — S29.012A UPPER BACK STRAIN, INITIAL ENCOUNTER: ICD-10-CM

## 2023-09-06 DIAGNOSIS — J44.1 COPD EXACERBATION (HCC): Primary | ICD-10-CM

## 2023-09-06 LAB
ALBUMIN SERPL-MCNC: 3.8 G/DL (ref 3.5–5.2)
ALP SERPL-CCNC: 117 U/L (ref 35–104)
ALT SERPL-CCNC: 20 U/L (ref 0–32)
ANION GAP SERPL CALCULATED.3IONS-SCNC: 15 MMOL/L (ref 7–16)
AST SERPL-CCNC: 15 U/L (ref 0–31)
BASOPHILS # BLD: 0.02 K/UL (ref 0–0.2)
BASOPHILS NFR BLD: 0 % (ref 0–2)
BILIRUB SERPL-MCNC: 0.3 MG/DL (ref 0–1.2)
BNP SERPL-MCNC: 1274 PG/ML (ref 0–125)
BUN SERPL-MCNC: 26 MG/DL (ref 6–23)
CALCIUM SERPL-MCNC: 9.7 MG/DL (ref 8.6–10.2)
CHLORIDE SERPL-SCNC: 95 MMOL/L (ref 98–107)
CO2 SERPL-SCNC: 27 MMOL/L (ref 22–29)
CREAT SERPL-MCNC: 1.5 MG/DL (ref 0.5–1)
EOSINOPHIL # BLD: 0.17 K/UL (ref 0.05–0.5)
EOSINOPHILS RELATIVE PERCENT: 1 % (ref 0–6)
ERYTHROCYTE [DISTWIDTH] IN BLOOD BY AUTOMATED COUNT: 13 % (ref 11.5–15)
GFR SERPL CREATININE-BSD FRML MDRD: 37 ML/MIN/1.73M2
GLUCOSE SERPL-MCNC: 203 MG/DL (ref 74–99)
HCT VFR BLD AUTO: 36.4 % (ref 34–48)
HGB BLD-MCNC: 11.7 G/DL (ref 11.5–15.5)
IMM GRANULOCYTES # BLD AUTO: 0.07 K/UL (ref 0–0.58)
IMM GRANULOCYTES NFR BLD: 1 % (ref 0–5)
LYMPHOCYTES NFR BLD: 1.15 K/UL (ref 1.5–4)
LYMPHOCYTES RELATIVE PERCENT: 9 % (ref 20–42)
MCH RBC QN AUTO: 28.8 PG (ref 26–35)
MCHC RBC AUTO-ENTMCNC: 32.1 G/DL (ref 32–34.5)
MCV RBC AUTO: 89.7 FL (ref 80–99.9)
MONOCYTES NFR BLD: 0.65 K/UL (ref 0.1–0.95)
MONOCYTES NFR BLD: 5 % (ref 2–12)
NEUTROPHILS NFR BLD: 85 % (ref 43–80)
NEUTS SEG NFR BLD: 11.34 K/UL (ref 1.8–7.3)
PLATELET # BLD AUTO: 311 K/UL (ref 130–450)
PMV BLD AUTO: 9.5 FL (ref 7–12)
POTASSIUM SERPL-SCNC: 3.7 MMOL/L (ref 3.5–5)
PROT SERPL-MCNC: 7.1 G/DL (ref 6.4–8.3)
RBC # BLD AUTO: 4.06 M/UL (ref 3.5–5.5)
SODIUM SERPL-SCNC: 137 MMOL/L (ref 132–146)
TROPONIN I SERPL HS-MCNC: 39 NG/L (ref 0–9)
TROPONIN I SERPL HS-MCNC: 45 NG/L (ref 0–9)
WBC OTHER # BLD: 13.4 K/UL (ref 4.5–11.5)

## 2023-09-06 PROCEDURE — 6360000002 HC RX W HCPCS: Performed by: PHYSICIAN ASSISTANT

## 2023-09-06 PROCEDURE — 99285 EMERGENCY DEPT VISIT HI MDM: CPT

## 2023-09-06 PROCEDURE — 96375 TX/PRO/DX INJ NEW DRUG ADDON: CPT

## 2023-09-06 PROCEDURE — 84484 ASSAY OF TROPONIN QUANT: CPT

## 2023-09-06 PROCEDURE — 96374 THER/PROPH/DIAG INJ IV PUSH: CPT

## 2023-09-06 PROCEDURE — 85025 COMPLETE CBC W/AUTO DIFF WBC: CPT

## 2023-09-06 PROCEDURE — 80053 COMPREHEN METABOLIC PANEL: CPT

## 2023-09-06 PROCEDURE — 71046 X-RAY EXAM CHEST 2 VIEWS: CPT

## 2023-09-06 PROCEDURE — 83880 ASSAY OF NATRIURETIC PEPTIDE: CPT

## 2023-09-06 PROCEDURE — 93005 ELECTROCARDIOGRAM TRACING: CPT | Performed by: PHYSICIAN ASSISTANT

## 2023-09-06 RX ORDER — DOXYCYCLINE HYCLATE 100 MG
100 TABLET ORAL 2 TIMES DAILY
Qty: 20 TABLET | Refills: 0 | Status: SHIPPED | OUTPATIENT
Start: 2023-09-06 | End: 2023-09-16

## 2023-09-06 RX ORDER — ONDANSETRON 2 MG/ML
4 INJECTION INTRAMUSCULAR; INTRAVENOUS ONCE
Status: COMPLETED | OUTPATIENT
Start: 2023-09-06 | End: 2023-09-06

## 2023-09-06 RX ORDER — PREDNISONE 20 MG/1
TABLET ORAL
Qty: 18 TABLET | Refills: 0 | Status: SHIPPED | OUTPATIENT
Start: 2023-09-06 | End: 2023-09-16

## 2023-09-06 RX ORDER — MORPHINE SULFATE 8 MG/ML
5 INJECTION, SOLUTION INTRAMUSCULAR; INTRAVENOUS ONCE
Status: COMPLETED | OUTPATIENT
Start: 2023-09-06 | End: 2023-09-06

## 2023-09-06 RX ADMIN — METHYLPREDNISOLONE SODIUM SUCCINATE 125 MG: 125 INJECTION, POWDER, FOR SOLUTION INTRAMUSCULAR; INTRAVENOUS at 20:41

## 2023-09-06 RX ADMIN — MORPHINE SULFATE 5 MG: 8 INJECTION, SOLUTION INTRAMUSCULAR; INTRAVENOUS at 20:37

## 2023-09-06 RX ADMIN — ONDANSETRON 4 MG: 2 INJECTION INTRAMUSCULAR; INTRAVENOUS at 20:35

## 2023-09-06 ASSESSMENT — PAIN DESCRIPTION - LOCATION: LOCATION: BACK;RIB CAGE

## 2023-09-06 ASSESSMENT — PAIN SCALES - GENERAL
PAINLEVEL_OUTOF10: 9
PAINLEVEL_OUTOF10: 7

## 2023-09-06 ASSESSMENT — PAIN DESCRIPTION - DESCRIPTORS: DESCRIPTORS: STABBING

## 2023-09-07 NOTE — ED PROVIDER NOTES
versus discharge home with steroids. She stated that she would like to try to go home. Follow-up with pulmonology as needed. Scripts were sent for doxycycline and steroid taper to the pharmacy. Obviously at any point if the patient is not getting any better or seems to be getting worse in terms of her breathing or hypoxia she can return here at any point. The patient is aware and agreeable to this plan    Plan of Care/Counseling:  Alexander Pollack PA-C reviewed today's visit with the patient in addition to providing specific details for the plan of care and counseling regarding the diagnosis and prognosis. Questions are answered at this time and are agreeable with the plan. ASSESSMENT     1. COPD exacerbation ([address])    2. Upper back strain, initial encounter        DISPOSITION   Discharged home. Patient condition is stable    Discharge Instructions:   Patient referred to  Melva Huffman MD  [address] [address]  [phone]      or pulmonologist as needed if no better    NEW MEDICATIONS     New Prescriptions    DOXYCYCLINE HYCLATE (VIBRA-TABS) 100 MG TABLET    Take 1 tablet by mouth 2 times daily for 10 days    PREDNISONE (DELTASONE) 20 MG TABLET    Sig.: Take 60mg  Po qd x 3 days, then 40mg po qd x3 days, then 20mg po qd x 3 days. QS x 9 days     Electronically signed by Alexander Pollack PA-C   DD: 9/6/23  **This report was transcribed using voice recognition software. Every effort was made to ensure accuracy; however, inadvertent computerized transcription errors may be present.   END OF ED PROVIDER NOTE       Alexander Pollack PA-C  09/06/23 [id number]

## 2023-09-08 LAB
EKG ATRIAL RATE: 84 BPM
EKG P AXIS: 41 DEGREES
EKG P-R INTERVAL: 80 MS
EKG Q-T INTERVAL: 404 MS
EKG QRS DURATION: 96 MS
EKG QTC CALCULATION (BAZETT): 477 MS
EKG R AXIS: 6 DEGREES
EKG T AXIS: 40 DEGREES
EKG VENTRICULAR RATE: 84 BPM

## 2023-09-08 PROCEDURE — 93010 ELECTROCARDIOGRAM REPORT: CPT | Performed by: INTERNAL MEDICINE

## 2023-10-13 ENCOUNTER — HOSPITAL ENCOUNTER (OUTPATIENT)
Dept: OTHER | Age: 73
Setting detail: THERAPIES SERIES
Discharge: HOME | End: 2023-10-13
Payer: MEDICARE

## 2023-10-13 VITALS
HEART RATE: 77 BPM | BODY MASS INDEX: 30.21 KG/M2 | SYSTOLIC BLOOD PRESSURE: 130 MMHG | DIASTOLIC BLOOD PRESSURE: 60 MMHG | RESPIRATION RATE: 16 BRPM | OXYGEN SATURATION: 97 % | WEIGHT: 190 LBS

## 2023-10-13 LAB
ANION GAP SERPL CALCULATED.3IONS-SCNC: 12 MMOL/L (ref 7–16)
BNP SERPL-MCNC: 451 PG/ML (ref 0–125)
BUN SERPL-MCNC: 35 MG/DL (ref 6–23)
CALCIUM SERPL-MCNC: 10 MG/DL (ref 8.6–10.2)
CHLORIDE SERPL-SCNC: 95 MMOL/L (ref 98–107)
CO2 SERPL-SCNC: 29 MMOL/L (ref 22–29)
CREAT SERPL-MCNC: 1.6 MG/DL (ref 0.5–1)
GFR SERPL CREATININE-BSD FRML MDRD: 35 ML/MIN/1.73M2
GLUCOSE SERPL-MCNC: 105 MG/DL (ref 74–99)
POTASSIUM SERPL-SCNC: 3.7 MMOL/L (ref 3.5–5)
SODIUM SERPL-SCNC: 136 MMOL/L (ref 132–146)

## 2023-10-13 PROCEDURE — 83880 ASSAY OF NATRIURETIC PEPTIDE: CPT

## 2023-10-13 PROCEDURE — 36415 COLL VENOUS BLD VENIPUNCTURE: CPT

## 2023-10-13 PROCEDURE — 99205 OFFICE O/P NEW HI 60 MIN: CPT

## 2023-10-13 PROCEDURE — 80048 BASIC METABOLIC PNL TOTAL CA: CPT

## 2023-10-13 ASSESSMENT — PATIENT HEALTH QUESTIONNAIRE - PHQ9
2. FEELING DOWN, DEPRESSED OR HOPELESS: NOT AT ALL
SUM OF ALL RESPONSES TO PHQ9 QUESTIONS 1 & 2: 0
1. LITTLE INTEREST OR PLEASURE IN DOING THINGS: NOT AT ALL

## 2023-10-13 NOTE — PROGRESS NOTES
consecutive weekly appointments made today so that patient has a total of 4 visits within first 30 days. Patient recently discharged from [facility]. Patient SOB with activity recovers with rest wears 2 liters N/C since hospitalization,Patient weighing daily and keeping trend sheet and brought to clinic today. Patient following low sodium 2000mg diet and hydrating with 64 fld ounces     Appointments made per patients request for afternoon appointments, patient aware to call CHF Clinic with 3lb weight gain in a day 5lbs in a week and also with lower leg edema , abd.bloating or increased SOB patient verbalizes understanding. Scheduled to follow up in CHF clinic on:    Future Appointments   Date Time Provider [address]   [facility]   11/6/2023  1[facility]   [facility]13/2023 12[facility]

## 2023-10-13 NOTE — DISCHARGE INSTRUCTIONS
Learning About Heart Failure Zones  What are heart failure zones? Heart failure zones give you an easy way to see changes in your heart failure symptoms. They also tell you when you need to get help. Check every day to see which zone you are in. Green zone. You are doing well. This is where you want to be. Your weight is stable. It's not going up or down. You breathe easily. You are sleeping well. You are able to lie flat without shortness of breath. You can do your usual activities. Yellow zone. Be careful. Your symptoms are changing. Call your doctor. You have new or increased shortness of breath. You are dizzy or lightheaded, or you feel like you may faint. You have sudden weight gain, such as more than 2 to 3 pounds in a day or 5 pounds in a week. (Your doctor may suggest a different range of weight gain.)  You have increased swelling in your legs, ankles, or feet. You are so tired or weak that you can't do your usual activities. You are not sleeping well. Shortness of breath wakes you up at night. You need extra pillows. Red zone. This is an emergency. Call 911. You have symptoms of sudden heart failure. For example: You have severe trouble breathing. You cough up pink, foamy mucus. You have a new irregular or fast heartbeat. You have symptoms of a heart attack. These may include:  Chest pain or pressure, or a strange feeling in the chest.  Sweating. Shortness of breath. Nausea or vomiting. Pain, pressure, or a strange feeling in the back, neck, jaw, or upper belly or in one or both shoulders or arms. Lightheadedness or sudden weakness. A fast or irregular heartbeat. If you have symptoms of a heart attack: After you call 911, the  may tell you to chew 1 adult-strength or 2 to 4 low-dose aspirin. Wait for an ambulance. Do not try to drive yourself. Follow-up care is a key part of your treatment and safety.  Be sure to make and go to all appointments, and call your doctor

## 2023-10-30 ENCOUNTER — HOSPITAL ENCOUNTER (OUTPATIENT)
Dept: OTHER | Age: 73
Setting detail: THERAPIES SERIES
Discharge: HOME | End: 2023-10-30
Payer: MEDICARE

## 2023-10-30 VITALS
DIASTOLIC BLOOD PRESSURE: 80 MMHG | WEIGHT: 187 LBS | OXYGEN SATURATION: 93 % | BODY MASS INDEX: 29.73 KG/M2 | SYSTOLIC BLOOD PRESSURE: 162 MMHG | HEART RATE: 81 BPM | RESPIRATION RATE: 17 BRPM

## 2023-10-30 LAB
ANION GAP SERPL CALCULATED.3IONS-SCNC: 15 MMOL/L (ref 7–16)
BNP SERPL-MCNC: 364 PG/ML (ref 0–125)
BUN SERPL-MCNC: 44 MG/DL (ref 6–23)
CALCIUM SERPL-MCNC: 10.1 MG/DL (ref 8.6–10.2)
CHLORIDE SERPL-SCNC: 92 MMOL/L (ref 98–107)
CO2 SERPL-SCNC: 27 MMOL/L (ref 22–29)
CREAT SERPL-MCNC: 1.9 MG/DL (ref 0.5–1)
GFR SERPL CREATININE-BSD FRML MDRD: 28 ML/MIN/1.73M2
GLUCOSE SERPL-MCNC: 136 MG/DL (ref 74–99)
POTASSIUM SERPL-SCNC: 4 MMOL/L (ref 3.5–5)
SODIUM SERPL-SCNC: 134 MMOL/L (ref 132–146)

## 2023-10-30 PROCEDURE — 80048 BASIC METABOLIC PNL TOTAL CA: CPT

## 2023-10-30 PROCEDURE — 99214 OFFICE O/P EST MOD 30 MIN: CPT

## 2023-10-30 PROCEDURE — 36415 COLL VENOUS BLD VENIPUNCTURE: CPT

## 2023-10-30 PROCEDURE — 83880 ASSAY OF NATRIURETIC PEPTIDE: CPT

## 2023-10-30 NOTE — PROGRESS NOTES
Congestive Heart Failure [address]       Referring Provider: Dr Fadi Singh  Primary Care Physician: Casi Fan MD   Cardiologist: Dr Fadi Singh  Nephrologist: N/A      HISTORY OF PRESENT ILLNESS:     Geeta John is a 68 y.o. (1950) female with a history of HFpEF (EF> 50%), most recent EF:  Lab Results   Component Value Date    LVEF 63 06/03/2021    Zohreh Morris Echo 08/06/2016          presents to the CHF clinic for ongoing evaluation and monitoring of heart failure. In the CHF clinic today she denies any adverse symptoms except:  [] Dizziness or lightheadedness   [] Syncope or near syncope  [] Recent Fall  [] Shortness of breath at rest   [x] Dyspnea with exertion recovers with rest  [] Decline in functional capacity (unable to perform activities they had previously been able to do)  [] Fatigue   [] Orthopnea  [] PND  [] Nocturnal cough  [] Hemoptysis  [] Chest pain, pressure, or discomfort  [] Palpitations  [] Abdominal distention  [] Abdominal bloating  [] Early satiety  [] Blood in stool   [] Diarrhea  [] Constipation  [] Nausea/Vomiting  [] Decreased urinary response to oral diuretic   [] Scrotal swelling   [] Lower extremity edema  [] Used PRN doses of oral diuretic   [] Weight gain    Wt Readings from Last 3 Encounters:   10/30/23 84.8 kg (187 lb)   10/13/23 86.2 kg (190 lb)   09/06/23 85.7 kg (189 lb)           SOCIAL HISTORY:  [x] Denies tobacco, alcohol or illicit drug abuse  [] Tobacco use:  [] ETOH use:  [] Illicit drug use:        MEDICATIONS:    Allergies   Allergen Reactions    Penicillins Anaphylaxis and Shortness Of Breath    Sulfa Antibiotics Itching and Swelling     Prior to Visit Medications    Medication Sig Taking?  Authorizing Provider   empagliflozin (JARDIANCE) 10 MG tablet Take 2.5 tablets by mouth daily  Provider, MD Liv   glipiZIDE (GLUCOTROL) 5 MG tablet Take 1.5 mg by mouth 2 times daily (before meals)  Provider,

## 2023-11-06 ENCOUNTER — HOSPITAL ENCOUNTER (OUTPATIENT)
Dept: OTHER | Age: 73
Setting detail: THERAPIES SERIES
Discharge: HOME | End: 2023-11-06
Payer: MEDICARE

## 2023-11-06 VITALS
DIASTOLIC BLOOD PRESSURE: 49 MMHG | WEIGHT: 186 LBS | RESPIRATION RATE: 16 BRPM | OXYGEN SATURATION: 97 % | SYSTOLIC BLOOD PRESSURE: 150 MMHG | BODY MASS INDEX: 29.57 KG/M2 | HEART RATE: 81 BPM

## 2023-11-06 LAB
ANION GAP SERPL CALCULATED.3IONS-SCNC: 15 MMOL/L (ref 7–16)
BNP SERPL-MCNC: 322 PG/ML (ref 0–125)
BUN SERPL-MCNC: 44 MG/DL (ref 6–23)
CALCIUM SERPL-MCNC: 10.2 MG/DL (ref 8.6–10.2)
CHLORIDE SERPL-SCNC: 96 MMOL/L (ref 98–107)
CO2 SERPL-SCNC: 28 MMOL/L (ref 22–29)
CREAT SERPL-MCNC: 1.9 MG/DL (ref 0.5–1)
GFR SERPL CREATININE-BSD FRML MDRD: 28 ML/MIN/1.73M2
GLUCOSE SERPL-MCNC: 148 MG/DL (ref 74–99)
POTASSIUM SERPL-SCNC: 4.3 MMOL/L (ref 3.5–5)
SODIUM SERPL-SCNC: 139 MMOL/L (ref 132–146)

## 2023-11-06 PROCEDURE — 36415 COLL VENOUS BLD VENIPUNCTURE: CPT

## 2023-11-06 PROCEDURE — 83880 ASSAY OF NATRIURETIC PEPTIDE: CPT

## 2023-11-06 PROCEDURE — 80048 BASIC METABOLIC PNL TOTAL CA: CPT

## 2023-11-06 PROCEDURE — 99214 OFFICE O/P EST MOD 30 MIN: CPT

## 2023-11-06 NOTE — DISCHARGE INSTRUCTIONS
Medicines for Heart Failure: Care Instructions  Overview     Most people with heart failure are helped by taking several medicines to protect their heart. Medicines may be used to help you feel better, have a better quality of life, stay out of the hospital, and live longer. They may help your heart work better. You might not feel better right away. But give it time, and keep taking your medicines. Most people start to see improvements and feel better over time. It's important to take all your medicines exactly as prescribed to get the best results. They can also cause side effects. If you think that any of your medicines are causing side effects, talk with your doctor or pharmacist. They may be able to help by changing the medicine or the dose. Follow-up care is a key part of your treatment and safety. Be sure to make and go to all appointments. Call your doctor if you are having problems. It's also a good idea to know your test results and keep a list of the medicines you take. What medicines are used for heart failure? This list includes most of the common medicines used to treat heart failure. You will get more details on the specific medicines your doctor prescribes. Aldosterone receptor antagonists. These are a type of diuretic. They make the kidneys get rid of extra fluid and lower blood pressure. They also help keep the heart muscle healthy. Angiotensin-converting enzyme (ACE) inhibitors. These relax and widen blood vessels. This makes it easier for blood to flow through the vessels. Angiotensin II receptor blockers (ARBs). These relax and widen blood vessels. This makes it easier for blood to flow through the vessels. Angiotensin receptor neprilysin inhibitors (ARNIs). These also make it easier for blood to flow through the vessels. Beta-blockers. These slow the heart rate. They also lower the workload on the heart. Diuretics. These reduce swelling.  They do this by helping the kidneys get

## 2023-11-06 NOTE — PROGRESS NOTES
Calcium (mg/dL)   Date Value   11/06/2023 10.2   10/30/2023 10.1   10/13/2023 10.0     BNP:  Pro-BNP (pg/mL)   Date Value   11/06/2023 322 (H)   10/30/2023 364 (H)   10/13/2023 451 (H)      CBC:  WBC (k/uL)   Date Value   09/06/2023 13.4 (H)     Hemoglobin (g/dL)   Date Value   09/06/2023 11.7     Hematocrit (%)   Date Value   09/06/2023 36.4     Platelets (k/uL)   Date Value   09/06/2023 311     Iron Studies:  No results found for: \"FERRITIN\", \"IRON\", \"TIBC\"  Hepatic:  AST (U/L)   Date Value   09/06/2023 15     ALT (U/L)   Date Value   09/06/2023 20     Total Bilirubin (mg/dL)   Date Value   09/06/2023 0.3     Alkaline Phosphatase (U/L)   Date Value   09/06/2023 117 (H)     INR:  INR (no units)   Date Value   08/09/2016 1.2         Wt Readings from Last 3 Encounters:   11/06/23 84.4 kg (186 lb)   10/30/23 84.8 kg (187 lb)   10/13/23 86.2 kg (190 lb)           ASSESSMENT/PLAN:    [x] Euvolemic no JVD , abd soft, lungs are clear, no lower leg edema, patient has a good respose to diuretic          [] Hypervolemic, with increase from baseline:  [] Shortness of breath/RODRIGUEZ  [] JVD  [] HJR  [] Abnormal lung assessment:   [] Orthopnea  [] PND  [] Decreased urinary response to oral diuretic   [] Scrotal swelling   [] Lower extremity edema  [] Compression stockings provided  [] Decline in functional capacity (unable to perform activities they had previously been able to do)  [] Weight gain     [] IV diuretics given No  [] Provider notified of recurrent IV diuretic use    Additional Notes: negative assessment.          [x]Lab work obtained    [x] Patient/Family Educated On:  [x] HF zones (Green, Yellow, Red) and aware of when to take action   [x] Daily weights  [] Scale provided   [x] Low sodium diet (2000 mg)  Barriers to compliance  [] Refuses to monitor diet  [] Socioeconomic difficulties  [] Unable to cook for self (use of frozen meals, can goods, etc)  [] CHF CHW consulted  [] Low sodium meal delivery options given to

## 2023-11-13 ENCOUNTER — HOSPITAL ENCOUNTER (OUTPATIENT)
Dept: OTHER | Age: 73
Setting detail: THERAPIES SERIES
Discharge: HOME | End: 2023-11-13
Payer: MEDICARE

## 2023-11-13 VITALS
BODY MASS INDEX: 29.79 KG/M2 | HEART RATE: 89 BPM | WEIGHT: 187.4 LBS | RESPIRATION RATE: 18 BRPM | DIASTOLIC BLOOD PRESSURE: 80 MMHG | SYSTOLIC BLOOD PRESSURE: 150 MMHG | OXYGEN SATURATION: 95 %

## 2023-11-13 LAB
ANION GAP SERPL CALCULATED.3IONS-SCNC: 16 MMOL/L (ref 7–16)
BNP SERPL-MCNC: 310 PG/ML (ref 0–125)
BUN SERPL-MCNC: 42 MG/DL (ref 6–23)
CALCIUM SERPL-MCNC: 10.1 MG/DL (ref 8.6–10.2)
CHLORIDE SERPL-SCNC: 95 MMOL/L (ref 98–107)
CO2 SERPL-SCNC: 26 MMOL/L (ref 22–29)
CREAT SERPL-MCNC: 1.9 MG/DL (ref 0.5–1)
GFR SERPL CREATININE-BSD FRML MDRD: 28 ML/MIN/1.73M2
GLUCOSE SERPL-MCNC: 172 MG/DL (ref 74–99)
POTASSIUM SERPL-SCNC: 4.1 MMOL/L (ref 3.5–5)
SODIUM SERPL-SCNC: 137 MMOL/L (ref 132–146)

## 2023-11-13 PROCEDURE — 36415 COLL VENOUS BLD VENIPUNCTURE: CPT

## 2023-11-13 PROCEDURE — 99214 OFFICE O/P EST MOD 30 MIN: CPT

## 2023-11-13 PROCEDURE — 80048 BASIC METABOLIC PNL TOTAL CA: CPT

## 2023-11-13 PROCEDURE — 83880 ASSAY OF NATRIURETIC PEPTIDE: CPT

## 2023-11-13 RX ORDER — HYDROCODONE BITARTRATE AND ACETAMINOPHEN 5; 325 MG/1; MG/1
1 TABLET ORAL EVERY 6 HOURS PRN
COMMUNITY

## 2023-11-13 NOTE — PROGRESS NOTES
provided and made aware to call clinic for sooner if evaluation if needed     [x] Difficulty affording medications  [x] CHF CHW consulted  [x] Prescription assistance information given   [] [facility] medication assistance program information given   [] Sample medications provided to patient to help bridge gap until affordability   [] Patient provided community resources for counseling services  [] PCP called and PHQ result faxed   [] Behavorial health consultant called    Additional notes:         Scheduled to follow up in CHF clinic on:    Future Appointments   Date Time Provider [address]   12/7/2023 12:00 PM ALEKSANDRA [facility] ROOM 3 [facility]

## 2023-11-14 ENCOUNTER — TELEPHONE (OUTPATIENT)
Dept: OTHER | Age: 73
End: 2023-11-14

## 2023-11-14 DIAGNOSIS — Z13.9 ENCOUNTER FOR SCREENING INVOLVING SOCIAL DETERMINANTS OF HEALTH (SDOH): ICD-10-CM

## 2023-11-14 DIAGNOSIS — Z59.6 PATIENT CANNOT AFFORD MEDICATIONS: Primary | ICD-10-CM

## 2023-11-14 SDOH — ECONOMIC STABILITY - INCOME SECURITY: LOW INCOME: Z59.6

## 2023-11-14 NOTE — TELEPHONE ENCOUNTER
CHF CHW Initial Call  11/14/2023  10:55 AM    CHW received referral from Arna Hatchet, RN. Patient need: Assistance with affording medications Mayra Kidd)  Notes: CHW called patient to assist with prescription coverage. I sent referral to the PAP. Michael De Jesus will email me once application is started. Next anticipated outreach: I will anticipate an email from Griselda Enrique once she starts application. Patient in agreement with plan and further assistance. [x] Agreed    [] Declined      CHW informed patient of the services and resources that can be provided to them. CHW instructed patient to call CHF CHW at [phone] for any future assistance.      Electronically signed by Mani Walker on 11/14/2023 at 10:55 AM

## 2023-11-29 NOTE — TELEPHONE ENCOUNTER
CHF CHW Follow up Call  11/29/2023  10:51 AM     Notes: Rosa Maria Doss emailed me back and stated she spoke to [facility] on 11/14/23  and they will start the enrollment process for Jardiance. CHW instructed patient to call CHF CHW at [phone] for further assistance.     Electronically signed by Victor Manuel Borrego on 11/29/2023 at 10:51 AM no

## 2023-12-07 ENCOUNTER — HOSPITAL ENCOUNTER (OUTPATIENT)
Dept: OTHER | Age: 73
Setting detail: THERAPIES SERIES
Discharge: HOME | End: 2023-12-07
Payer: MEDICARE

## 2023-12-07 VITALS
OXYGEN SATURATION: 100 % | WEIGHT: 185.4 LBS | SYSTOLIC BLOOD PRESSURE: 134 MMHG | BODY MASS INDEX: 29.48 KG/M2 | RESPIRATION RATE: 22 BRPM | DIASTOLIC BLOOD PRESSURE: 75 MMHG | HEART RATE: 82 BPM

## 2023-12-07 LAB
ANION GAP SERPL CALCULATED.3IONS-SCNC: 16 MMOL/L (ref 7–16)
BNP SERPL-MCNC: 395 PG/ML (ref 0–125)
BUN SERPL-MCNC: 35 MG/DL (ref 6–23)
CALCIUM SERPL-MCNC: 9.9 MG/DL (ref 8.6–10.2)
CHLORIDE SERPL-SCNC: 97 MMOL/L (ref 98–107)
CO2 SERPL-SCNC: 24 MMOL/L (ref 22–29)
CREAT SERPL-MCNC: 1.6 MG/DL (ref 0.5–1)
GFR SERPL CREATININE-BSD FRML MDRD: 35 ML/MIN/1.73M2
GLUCOSE SERPL-MCNC: 147 MG/DL (ref 74–99)
POTASSIUM SERPL-SCNC: 3.6 MMOL/L (ref 3.5–5)
SODIUM SERPL-SCNC: 137 MMOL/L (ref 132–146)

## 2023-12-07 PROCEDURE — 80048 BASIC METABOLIC PNL TOTAL CA: CPT

## 2023-12-07 PROCEDURE — 2580000003 HC RX 258

## 2023-12-07 PROCEDURE — 96374 THER/PROPH/DIAG INJ IV PUSH: CPT

## 2023-12-07 PROCEDURE — 36415 COLL VENOUS BLD VENIPUNCTURE: CPT

## 2023-12-07 PROCEDURE — 83880 ASSAY OF NATRIURETIC PEPTIDE: CPT

## 2023-12-07 PROCEDURE — 99214 OFFICE O/P EST MOD 30 MIN: CPT

## 2023-12-07 PROCEDURE — 6360000002 HC RX W HCPCS

## 2023-12-07 RX ORDER — FUROSEMIDE 10 MG/ML
40 INJECTION INTRAMUSCULAR; INTRAVENOUS ONCE
Status: COMPLETED | OUTPATIENT
Start: 2023-12-07 | End: 2023-12-07

## 2023-12-07 RX ORDER — SODIUM CHLORIDE 0.9 % (FLUSH) 0.9 %
10 SYRINGE (ML) INJECTION ONCE
Status: COMPLETED | OUTPATIENT
Start: 2023-12-07 | End: 2023-12-07

## 2023-12-07 RX ORDER — SODIUM CHLORIDE 0.9 % (FLUSH) 0.9 %
SYRINGE (ML) INJECTION
Status: COMPLETED
Start: 2023-12-07 | End: 2023-12-07

## 2023-12-07 RX ORDER — FUROSEMIDE 10 MG/ML
INJECTION INTRAMUSCULAR; INTRAVENOUS
Status: COMPLETED
Start: 2023-12-07 | End: 2023-12-07

## 2023-12-07 RX ADMIN — SODIUM CHLORIDE, PRESERVATIVE FREE 10 ML: 5 INJECTION INTRAVENOUS at 12:24

## 2023-12-07 RX ADMIN — FUROSEMIDE 40 MG: 10 INJECTION, SOLUTION INTRAMUSCULAR; INTRAVENOUS at 12:24

## 2023-12-07 RX ADMIN — Medication 10 ML: at 12:24

## 2023-12-07 RX ADMIN — FUROSEMIDE 40 MG: 10 INJECTION INTRAMUSCULAR; INTRAVENOUS at 12:24

## 2023-12-07 NOTE — PROGRESS NOTES
Call placed to PCP's office Dr. Harman Flores in regards to patient's worsening shortness of breath and fatigue in past week. She says Dr. Harman Flores had her decrease torsemide from 40mg to 20mg and hold spironolactone. Patient says she has had to increase her oxygen at night to 4 L at night and 3L during the day and was unable to go to bank or store because of her worsening breathing symptoms. Spoke with Tremaine Gannon at Dr. June Haney office and gave her patient update. Also faxed labs to her to review with physician and that we gave IV diuretic today due to worsening symptoms.     Electronically signed by Dominik Sharp RN on 12/7/2023 at 2:56 PM
indication)  Jardiance 10 mg daily  Aldosterone antagonist: (2b indication)  Spironolactone 25 mg daily-on hold  ARNI/ACE I/ARB: (2b indication, with LVEF on lower end of spectrum)  No      HEART FAILURE FOCUSED PHYSICAL EXAMINATION:    Vitals:    12/07/23 1200   BP: 134/75   Pulse: 82   Resp: 22   SpO2: 100%          Assessment  Charting Type: Shift assessment (chf clinic)  Neurological  Level of Consciousness: Alert (0)        Respiratory  Respiratory Pattern: Tachypneic  Respiratory Depth: Normal  Respiratory Quality/Effort: Labored  Breath Sounds  Right Lower Lobe: Expiratory wheezes  Left Lower Lobe: Expiratory wheezes     Cardiac  Cardiac Rhythm: Sinus rhythm  Rhythm Interpretation  Pulse: 82     Gastrointestinal  Abdominal (WDL): Within Defined Limits  Abdomen Inspection: Rounded, Soft         Peripheral Vascular  Peripheral Vascular (WDL): Within Defined Limits  Edema: Right lower extremity, Left lower extremity  RLE Edema: None  LLE Edema: None           Genitourinary  Genitourinary (WDL): Within Defined Limits  Psychosocial  Psychosocial (WDL): Within Defined Limits              Pulse: 82             LAB DATA:  BMP:  Sodium (mmol/L)   Date Value   12/07/2023 137   11/13/2023 137   11/06/2023 139     Potassium (mmol/L)   Date Value   12/07/2023 3.6   11/13/2023 4.1   11/06/2023 4.3     Potassium reflex Magnesium (mmol/L)   Date Value   11/04/2022 3.7   11/03/2022 3.1 (L)   11/02/2022 3.4 (L)     Chloride (mmol/L)   Date Value   12/07/2023 97 (L)   11/13/2023 95 (L)   11/06/2023 96 (L)     CO2 (mmol/L)   Date Value   12/07/2023 24   11/13/2023 26   11/06/2023 28     BUN (mg/dL)   Date Value   12/07/2023 35 (H)   11/13/2023 42 (H)   11/06/2023 44 (H)     Creatinine (mg/dL)   Date Value   12/07/2023 1.6 (H)   11/13/2023 1.9 (H)   11/06/2023 1.9 (H)     Glucose (mg/dL)   Date Value   12/07/2023 147 (H)   11/13/2023 172 (H)   11/06/2023 148 (H)     Calcium (mg/dL)   Date Value   12/07/2023 9.9   11/13/2023 10.1

## 2023-12-28 ENCOUNTER — HOSPITAL ENCOUNTER (OUTPATIENT)
Dept: OTHER | Age: 73
Setting detail: THERAPIES SERIES
Discharge: HOME | End: 2023-12-28
Payer: MEDICARE

## 2023-12-28 VITALS
WEIGHT: 186.2 LBS | DIASTOLIC BLOOD PRESSURE: 61 MMHG | HEART RATE: 79 BPM | OXYGEN SATURATION: 95 % | SYSTOLIC BLOOD PRESSURE: 135 MMHG | BODY MASS INDEX: 29.61 KG/M2 | RESPIRATION RATE: 20 BRPM

## 2023-12-28 LAB
ANION GAP SERPL CALCULATED.3IONS-SCNC: 14 MMOL/L (ref 7–16)
BNP SERPL-MCNC: 484 PG/ML (ref 0–125)
BUN SERPL-MCNC: 35 MG/DL (ref 6–23)
CALCIUM SERPL-MCNC: 9.7 MG/DL (ref 8.6–10.2)
CHLORIDE SERPL-SCNC: 96 MMOL/L (ref 98–107)
CO2 SERPL-SCNC: 29 MMOL/L (ref 22–29)
CREAT SERPL-MCNC: 1.4 MG/DL (ref 0.5–1)
GFR SERPL CREATININE-BSD FRML MDRD: 40 ML/MIN/1.73M2
GLUCOSE SERPL-MCNC: 137 MG/DL (ref 74–99)
POTASSIUM SERPL-SCNC: 3.7 MMOL/L (ref 3.5–5)
SODIUM SERPL-SCNC: 139 MMOL/L (ref 132–146)

## 2023-12-28 PROCEDURE — 6360000002 HC RX W HCPCS

## 2023-12-28 PROCEDURE — 80048 BASIC METABOLIC PNL TOTAL CA: CPT

## 2023-12-28 PROCEDURE — 96374 THER/PROPH/DIAG INJ IV PUSH: CPT

## 2023-12-28 PROCEDURE — 2580000003 HC RX 258

## 2023-12-28 PROCEDURE — 36415 COLL VENOUS BLD VENIPUNCTURE: CPT

## 2023-12-28 PROCEDURE — 83880 ASSAY OF NATRIURETIC PEPTIDE: CPT

## 2023-12-28 PROCEDURE — 99214 OFFICE O/P EST MOD 30 MIN: CPT

## 2023-12-28 RX ORDER — SODIUM CHLORIDE 0.9 % (FLUSH) 0.9 %
10 SYRINGE (ML) INJECTION ONCE
Status: COMPLETED | OUTPATIENT
Start: 2023-12-28 | End: 2023-12-28

## 2023-12-28 RX ORDER — FUROSEMIDE 10 MG/ML
INJECTION INTRAMUSCULAR; INTRAVENOUS
Status: COMPLETED
Start: 2023-12-28 | End: 2023-12-28

## 2023-12-28 RX ORDER — SODIUM CHLORIDE 0.9 % (FLUSH) 0.9 %
SYRINGE (ML) INJECTION
Status: COMPLETED
Start: 2023-12-28 | End: 2023-12-28

## 2023-12-28 RX ORDER — FUROSEMIDE 10 MG/ML
40 INJECTION INTRAMUSCULAR; INTRAVENOUS ONCE
Status: COMPLETED | OUTPATIENT
Start: 2023-12-28 | End: 2023-12-28

## 2023-12-28 RX ORDER — BUDESONIDE 0.5 MG/2ML
1 INHALANT ORAL 2 TIMES DAILY
COMMUNITY

## 2023-12-28 RX ADMIN — FUROSEMIDE 40 MG: 10 INJECTION, SOLUTION INTRAMUSCULAR; INTRAVENOUS at 13:30

## 2023-12-28 RX ADMIN — Medication 10 ML: at 13:30

## 2023-12-28 RX ADMIN — FUROSEMIDE 40 MG: 10 INJECTION INTRAMUSCULAR; INTRAVENOUS at 13:30

## 2023-12-28 RX ADMIN — SODIUM CHLORIDE, PRESERVATIVE FREE 10 ML: 5 INJECTION INTRAVENOUS at 13:30

## 2023-12-28 NOTE — PROGRESS NOTES
Congestive Heart Failure [address]       Referring Provider: Dr Viktoria Zafar  Primary Care Physician: Keyshawn Mike MD   Cardiologist: Dr Viktoria Zafar  Nephrologist: N/A      HISTORY OF PRESENT ILLNESS:     Krystin Lombardo is a 68 y.o. (1950) female with a history of HFpEF (EF> 50%), most recent EF:63% on 6-3-21  Lab Results   Component Value Date    LVEF 63 06/03/2021    Mar Howard Echo 08/06/2016          presents to the CHF clinic for ongoing evaluation and monitoring of heart failure. In the CHF clinic today she denies any adverse symptoms except:  [] Dizziness or lightheadedness   [] Syncope or near syncope  [] Recent Fall  [] Shortness of breath at rest   [x] Dyspnea with exertion recovers with rest wears 3 liters n/c with activity non at rest and 4ln/c at night  [] Decline in functional capacity (unable to perform activities they had previously been able to do)  [] Fatigue   [] Orthopnea  [] PND  [] Nocturnal cough  [] Hemoptysis  [] Chest pain, pressure, or discomfort  [] Palpitations  [x] Abdominal distention  [x] Abdominal bloating  [] Early satiety  [] Blood in stool   [] Diarrhea  [] Constipation  [] Nausea/Vomiting  [] Decreased urinary response to oral diuretic   [] Scrotal swelling   [] Lower extremity edema  [] Used PRN doses of oral diuretic   [x] Weight gain    Wt Readings from Last 3 Encounters:   12/28/23 84.5 kg (186 lb 3.2 oz)   12/22/23 83.9 kg (185 lb)   12/07/23 84.1 kg (185 lb 6.4 oz)           SOCIAL HISTORY:  [x] Denies tobacco, alcohol or illicit drug abuse  [] Tobacco use:  [] ETOH use:  [] Illicit drug use:        MEDICATIONS:    Allergies   Allergen Reactions    Penicillins Anaphylaxis and Shortness Of Breath    Sulfa Antibiotics Itching and Swelling     Prior to Visit Medications    Medication Sig Taking?  Authorizing Provider   budesonide (PULMICORT) 0.5 MG/2ML nebulizer suspension Take 2 mLs by nebulization 2 times daily Yes

## 2024-01-02 ENCOUNTER — TELEPHONE (OUTPATIENT)
Dept: NON INVASIVE DIAGNOSTICS | Age: 74
End: 2024-01-02

## 2024-01-04 ENCOUNTER — TELEPHONE (OUTPATIENT)
Dept: NON INVASIVE DIAGNOSTICS | Age: 74
End: 2024-01-04

## 2024-01-18 ENCOUNTER — HOSPITAL ENCOUNTER (OUTPATIENT)
Dept: OTHER | Age: 74
Setting detail: THERAPIES SERIES
Discharge: HOME | End: 2024-01-18
Payer: MEDICARE

## 2024-01-18 VITALS
DIASTOLIC BLOOD PRESSURE: 70 MMHG | SYSTOLIC BLOOD PRESSURE: 140 MMHG | RESPIRATION RATE: 20 BRPM | HEART RATE: 68 BPM | WEIGHT: 185 LBS | OXYGEN SATURATION: 98 % | BODY MASS INDEX: 29.42 KG/M2

## 2024-01-18 LAB
ANION GAP SERPL CALCULATED.3IONS-SCNC: 14 MMOL/L (ref 7–16)
BNP SERPL-MCNC: 396 PG/ML (ref 0–125)
BUN SERPL-MCNC: 36 MG/DL (ref 6–23)
CALCIUM SERPL-MCNC: 9.7 MG/DL (ref 8.6–10.2)
CHLORIDE SERPL-SCNC: 95 MMOL/L (ref 98–107)
CO2 SERPL-SCNC: 27 MMOL/L (ref 22–29)
CREAT SERPL-MCNC: 1.7 MG/DL (ref 0.5–1)
GFR SERPL CREATININE-BSD FRML MDRD: 32 ML/MIN/1.73M2
GLUCOSE SERPL-MCNC: 135 MG/DL (ref 74–99)
POTASSIUM SERPL-SCNC: 3.8 MMOL/L (ref 3.5–5)
SODIUM SERPL-SCNC: 136 MMOL/L (ref 132–146)

## 2024-01-18 PROCEDURE — 80048 BASIC METABOLIC PNL TOTAL CA: CPT

## 2024-01-18 PROCEDURE — 83880 ASSAY OF NATRIURETIC PEPTIDE: CPT

## 2024-01-18 PROCEDURE — 99214 OFFICE O/P EST MOD 30 MIN: CPT

## 2024-01-18 PROCEDURE — 36415 COLL VENOUS BLD VENIPUNCTURE: CPT

## 2024-01-18 RX ORDER — PHENELZINE SULFATE 15 MG/1
15 TABLET ORAL 3 TIMES DAILY PRN
COMMUNITY

## 2024-01-18 NOTE — PROGRESS NOTES
CPAP/BiPAP use  [] Low impact exercise / cardiac rehab   [] LifeVest use  [x] Patient aware of signs and symptoms of worsening HF, CHF clinic phone number provided and made aware to call clinic for sooner if evaluation if needed     [x] Difficulty affording medications  [x] CHF CHW consulted  [x] Prescription assistance information given   [] [facility] medication assistance program information given   [] Sample medications provided to patient to help bridge gap until affordability   [] Patient provided community resources for counseling services  [] PCP called and PHQ result faxed   [] Behavorial health consultant called    Additional notes:     Scheduled to follow up in CHF clinic on:   Future Appointments   Date Time Provider Department Center   2/23/2024 11:15 AM Serafin Vaughn Jr., PREMA N BABIN POD [facility]   4/16/2024  1:00 PM [facility] ROOM 3 [facility]   4/22/2024 11:00 AM Servando Hernandez DO ELECTRO PHYS [facility]   4/22/2024 11:00 AM SCHEDULE, DEVICE CLINIC 1 SAMANTHA [facility]LECTRO [facility]

## 2024-01-29 PROCEDURE — 93296 REM INTERROG EVL PM/IDS: CPT | Performed by: STUDENT IN AN ORGANIZED HEALTH CARE EDUCATION/TRAINING PROGRAM

## 2024-01-29 PROCEDURE — 93294 REM INTERROG EVL PM/LDLS PM: CPT | Performed by: STUDENT IN AN ORGANIZED HEALTH CARE EDUCATION/TRAINING PROGRAM

## 2024-02-20 ENCOUNTER — TELEPHONE (OUTPATIENT)
Dept: OTHER | Age: 74
End: 2024-02-20

## 2024-02-20 NOTE — TELEPHONE ENCOUNTER
Patient called with concern of weight gain of 3 pounds in 2 days.   Offered her a STAT appointment but patient unable to come and says she will take an extra dose of her diuretic and call back tomorrow if weight gain continues or any other CHF symptoms.    Electronically signed by Dina Steele RN on 2/20/2024 at 11:37 AM

## 2024-02-21 ENCOUNTER — HOSPITAL ENCOUNTER (OUTPATIENT)
Dept: OTHER | Age: 74
Setting detail: THERAPIES SERIES
Discharge: HOME | End: 2024-02-21
Payer: MEDICARE

## 2024-02-21 VITALS
OXYGEN SATURATION: 95 % | DIASTOLIC BLOOD PRESSURE: 72 MMHG | HEART RATE: 73 BPM | SYSTOLIC BLOOD PRESSURE: 137 MMHG | WEIGHT: 191 LBS | BODY MASS INDEX: 30.37 KG/M2

## 2024-02-21 LAB
ANION GAP SERPL CALCULATED.3IONS-SCNC: 11 MMOL/L (ref 7–16)
BNP SERPL-MCNC: 515 PG/ML (ref 0–125)
BUN SERPL-MCNC: 29 MG/DL (ref 6–23)
CALCIUM SERPL-MCNC: 9.4 MG/DL (ref 8.6–10.2)
CHLORIDE SERPL-SCNC: 95 MMOL/L (ref 98–107)
CO2 SERPL-SCNC: 30 MMOL/L (ref 22–29)
CREAT SERPL-MCNC: 1.5 MG/DL (ref 0.5–1)
GFR SERPL CREATININE-BSD FRML MDRD: 37 ML/MIN/1.73M2
GLUCOSE SERPL-MCNC: 134 MG/DL (ref 74–99)
POTASSIUM SERPL-SCNC: 3.8 MMOL/L (ref 3.5–5)
SODIUM SERPL-SCNC: 136 MMOL/L (ref 132–146)

## 2024-02-21 PROCEDURE — 2580000003 HC RX 258

## 2024-02-21 PROCEDURE — 96374 THER/PROPH/DIAG INJ IV PUSH: CPT

## 2024-02-21 PROCEDURE — 83880 ASSAY OF NATRIURETIC PEPTIDE: CPT

## 2024-02-21 PROCEDURE — 6360000002 HC RX W HCPCS

## 2024-02-21 PROCEDURE — 36415 COLL VENOUS BLD VENIPUNCTURE: CPT

## 2024-02-21 PROCEDURE — 80048 BASIC METABOLIC PNL TOTAL CA: CPT

## 2024-02-21 PROCEDURE — 99214 OFFICE O/P EST MOD 30 MIN: CPT

## 2024-02-21 RX ORDER — SODIUM CHLORIDE 0.9 % (FLUSH) 0.9 %
5-40 SYRINGE (ML) INJECTION 2 TIMES DAILY
Status: DISCONTINUED | OUTPATIENT
Start: 2024-02-21 | End: 2024-02-22 | Stop reason: HOSPADM

## 2024-02-21 RX ORDER — FUROSEMIDE 10 MG/ML
INJECTION INTRAMUSCULAR; INTRAVENOUS
Status: COMPLETED
Start: 2024-02-21 | End: 2024-02-21

## 2024-02-21 RX ORDER — SODIUM CHLORIDE 0.9 % (FLUSH) 0.9 %
SYRINGE (ML) INJECTION
Status: COMPLETED
Start: 2024-02-21 | End: 2024-02-21

## 2024-02-21 RX ORDER — FUROSEMIDE 10 MG/ML
40 INJECTION INTRAMUSCULAR; INTRAVENOUS ONCE
Status: COMPLETED | OUTPATIENT
Start: 2024-02-21 | End: 2024-02-21

## 2024-02-21 RX ADMIN — FUROSEMIDE 40 MG: 10 INJECTION INTRAMUSCULAR; INTRAVENOUS at 09:47

## 2024-02-21 RX ADMIN — Medication 10 ML: at 09:48

## 2024-02-21 RX ADMIN — SODIUM CHLORIDE, PRESERVATIVE FREE 10 ML: 5 INJECTION INTRAVENOUS at 09:48

## 2024-02-21 RX ADMIN — FUROSEMIDE 40 MG: 10 INJECTION, SOLUTION INTRAMUSCULAR; INTRAVENOUS at 09:47

## 2024-02-21 NOTE — PROGRESS NOTES
Congestive Heart Failure Clinic   [facility]      Referring Provider: Dr Radha Reina  Primary Care Physician: Yuri Villalobos MD   Cardiologist: Dr Radha Reina  Nephrologist: N/A      HISTORY OF PRESENT ILLNESS:     Dilcia Mcadams is a 73 y.o. (1950) female with a history of HFpEF (EF> 50%), most recent EF:63% on 6-3-21  Lab Results   Component Value Date    LVEF 63 06/03/2021    LVEFMODE Echo 08/06/2016          Patient presents to the CHF clinic for ongoing evaluation and monitoring of heart failure.    In the CHF clinic today she denies any adverse symptoms except:  [x] Dizziness or lightheadedness   [] Syncope or near syncope  [] Recent Fall  [] Shortness of breath at rest   [x] Dyspnea with exertion  [] Decline in functional capacity (unable to perform activities they had previously been able to do)  [] Fatigue   [] Orthopnea  [] PND  [] Nocturnal cough  [] Hemoptysis  [] Chest pain, pressure, or discomfort  [] Palpitations  [] Abdominal distention  [x] Abdominal bloating  [] Early satiety  [] Blood in stool   [] Diarrhea  [x] Constipation  [] Nausea/Vomiting  [] Decreased urinary response to oral diuretic   [] Scrotal swelling   [] Lower extremity edema  [] Used PRN doses of oral diuretic   [x] Weight gain 6 pounds in the past week    Wt Readings from Last 3 Encounters:   02/21/24 86.6 kg (191 lb)   01/18/24 83.9 kg (185 lb)   12/28/23 84.5 kg (186 lb 3.2 oz)           SOCIAL HISTORY:  [x] Denies tobacco, alcohol or illicit drug abuse  [] Tobacco use:  [] ETOH use:  [] Illicit drug use:        MEDICATIONS:    Allergies   Allergen Reactions    Penicillins Anaphylaxis and Shortness Of Breath    Sulfa Antibiotics Itching and Swelling     Prior to Visit Medications    Medication Sig Taking? Authorizing Provider   MILKA WILLSON by Combination route as needed  Provider, MD Liv   HYDROcodone-acetaminophen (NORCO) 5-325 MG per tablet Take 1 tablet by mouth

## 2024-03-18 ENCOUNTER — PROCEDURE VISIT (OUTPATIENT)
Dept: PODIATRY | Age: 74
End: 2024-03-18
Payer: MEDICARE

## 2024-03-18 VITALS — WEIGHT: 183 LBS | HEIGHT: 67 IN | BODY MASS INDEX: 28.72 KG/M2

## 2024-03-18 DIAGNOSIS — I87.2 VENOUS INSUFFICIENCY (CHRONIC) (PERIPHERAL): ICD-10-CM

## 2024-03-18 DIAGNOSIS — I73.9 PVD (PERIPHERAL VASCULAR DISEASE) (HCC): ICD-10-CM

## 2024-03-18 DIAGNOSIS — E11.42 DIABETIC POLYNEUROPATHY ASSOCIATED WITH TYPE 2 DIABETES MELLITUS (HCC): ICD-10-CM

## 2024-03-18 DIAGNOSIS — R26.2 DIFFICULTY WALKING: ICD-10-CM

## 2024-03-18 DIAGNOSIS — B35.1 ONYCHOMYCOSIS: Primary | ICD-10-CM

## 2024-03-18 DIAGNOSIS — E11.51 TYPE II DIABETES MELLITUS WITH PERIPHERAL CIRCULATORY DISORDER (HCC): ICD-10-CM

## 2024-03-18 PROCEDURE — 99999 PR OFFICE/OUTPT VISIT,PROCEDURE ONLY: CPT | Performed by: PODIATRIST

## 2024-03-18 PROCEDURE — 11721 DEBRIDE NAIL 6 OR MORE: CPT | Performed by: PODIATRIST

## 2024-03-18 NOTE — PROGRESS NOTES
3/18/24     Dilcia Mcadams    : 1950  Sex: female  Age: 73 y.o.    Subjective:  The patient is seen today for evaluation regarding diabetic foot evaluation and mycotic nail care.  No other complaints noted.    Chief Complaint   Patient presents with    Nail Problem     Nail care       Current Medications:    Current Outpatient Medications:     SENNA CO, by Combination route as needed, Disp: , Rfl:     HYDROcodone-acetaminophen (NORCO) 5-325 MG per tablet, Take 1 tablet by mouth every 6 hours as needed for Pain., Disp: , Rfl:     glipiZIDE (GLUCOTROL) 5 MG tablet, Take 1.5 tablets by mouth 2 times daily (before meals), Disp: , Rfl:     Budeson-Glycopyrrol-Formoterol (BREZTRI AEROSPHERE) 160-9-4.8 MCG/ACT AERO, Inhale 2 puffs into the lungs 2 times daily, Disp: , Rfl:     LORazepam (ATIVAN) 0.5 MG tablet, Take 3 tablets by mouth at bedtime., Disp: , Rfl:     spironolactone (ALDACTONE) 25 MG tablet, Take 1 tablet by mouth at bedtime Takes at 1 am, Disp: , Rfl:     albuterol sulfate HFA (PROVENTIL;VENTOLIN;PROAIR) 108 (90 Base) MCG/ACT inhaler, Inhale 2 puffs into the lungs every 6 hours as needed for Wheezing, Disp: , Rfl:     OXYGEN, Inhale 4 L into the lungs nightly as needed Nightly, Disp: , Rfl:     promethazine (PHENERGAN) 25 MG tablet, Take 1 tablet by mouth every 8 hours as needed, Disp: , Rfl:     torsemide (DEMADEX) 20 MG tablet, Take 2 tablets by mouth daily, Disp: , Rfl:     Polyethylene Glycol 3350 (MIRALAX PO), Take by mouth as needed, Disp: , Rfl:     gabapentin (NEURONTIN) 300 MG capsule, Take 1 capsule by mouth nightly., Disp: , Rfl:     Melatonin 10 MG TABS, Take by mouth nightly , Disp: , Rfl:     Allergies:  Allergies   Allergen Reactions    Penicillins Anaphylaxis and Shortness Of Breath    Sulfa Antibiotics Itching and Swelling       Past Surgical History:   Procedure Laterality Date    APPENDECTOMY      ARM SURGERY Left 2022    EXCISION LEFT UPPER ARM LIPOMA performed by Joseph

## 2024-03-18 NOTE — PROGRESS NOTES
Patient here for nail care. Yuri Villalobos MD last visit 2/19/2024   Electronically signed by Adrianne Bergman LPN on 3/18/2024 at 1:39 PM

## 2024-04-16 ENCOUNTER — HOSPITAL ENCOUNTER (OUTPATIENT)
Dept: OTHER | Age: 74
Setting detail: THERAPIES SERIES
Discharge: HOME | End: 2024-04-16
Payer: MEDICARE

## 2024-04-16 VITALS
SYSTOLIC BLOOD PRESSURE: 168 MMHG | DIASTOLIC BLOOD PRESSURE: 62 MMHG | WEIGHT: 188 LBS | RESPIRATION RATE: 16 BRPM | HEART RATE: 75 BPM | BODY MASS INDEX: 29.89 KG/M2 | OXYGEN SATURATION: 95 %

## 2024-04-16 LAB
ANION GAP SERPL CALCULATED.3IONS-SCNC: 14 MMOL/L (ref 7–16)
BNP SERPL-MCNC: 397 PG/ML (ref 0–125)
BUN SERPL-MCNC: 45 MG/DL (ref 6–23)
CALCIUM SERPL-MCNC: 9.9 MG/DL (ref 8.6–10.2)
CHLORIDE SERPL-SCNC: 93 MMOL/L (ref 98–107)
CO2 SERPL-SCNC: 29 MMOL/L (ref 22–29)
CREAT SERPL-MCNC: 1.7 MG/DL (ref 0.5–1)
GFR SERPL CREATININE-BSD FRML MDRD: 32 ML/MIN/1.73M2
GLUCOSE SERPL-MCNC: 126 MG/DL (ref 74–99)
POTASSIUM SERPL-SCNC: 4.2 MMOL/L (ref 3.5–5)
SODIUM SERPL-SCNC: 136 MMOL/L (ref 132–146)

## 2024-04-16 PROCEDURE — 83880 ASSAY OF NATRIURETIC PEPTIDE: CPT

## 2024-04-16 PROCEDURE — 36415 COLL VENOUS BLD VENIPUNCTURE: CPT

## 2024-04-16 PROCEDURE — 80048 BASIC METABOLIC PNL TOTAL CA: CPT

## 2024-04-16 PROCEDURE — 99214 OFFICE O/P EST MOD 30 MIN: CPT

## 2024-04-16 RX ORDER — IPRATROPIUM BROMIDE AND ALBUTEROL SULFATE 2.5; .5 MG/3ML; MG/3ML
SOLUTION RESPIRATORY (INHALATION)
COMMUNITY
Start: 2024-03-27

## 2024-04-16 NOTE — PLAN OF CARE
Problem: Chronic Conditions and Co-morbidities  Goal: Patient's chronic conditions and co-morbidity symptoms are monitored and maintained or improved  4/16/2024 1335 by Michelle Rdz, RN  Outcome: Progressing  4/16/2024 1334 by Michelle Rdz, RN  Outcome: Progressing

## 2024-04-16 NOTE — PROGRESS NOTES
09/06/2023 11.7     Hematocrit (%)   Date Value   09/06/2023 36.4     Platelets (k/uL)   Date Value   09/06/2023 311     Iron Studies:  No results found for: \"FERRITIN\", \"IRON\", \"TIBC\"  Hepatic:  AST (U/L)   Date Value   09/06/2023 15     ALT (U/L)   Date Value   09/06/2023 20     Total Bilirubin (mg/dL)   Date Value   09/06/2023 0.3     Alkaline Phosphatase (U/L)   Date Value   09/06/2023 117 (H)     INR:  INR (no units)   Date Value   08/09/2016 1.2         Wt Readings from Last 3 Encounters:   04/16/24 85.3 kg (188 lb)   03/18/24 83 kg (183 lb)   02/21/24 86.6 kg (191 lb)           ASSESSMENT/PLAN:    [x] Euvolemic           [] Hypervolemic, with increase from baseline:  [] Shortness of breath/RODRIGUEZ  [] JVD  [] HJR  [] Abnormal lung assessment: diminished  [] Orthopnea  [] PND  [] Decreased urinary response to oral diuretic   [] Scrotal swelling   [] Lower extremity edema  [] Compression stockings provided  [] Decline in functional capacity (unable to perform activities they had previously been able to do)  [] Weight gain     [] IV diuretics given No   [] Provider notified of recurrent IV diuretic use    Additional Notes: Patients assessment unremarkable.  /62 and patient states it was this high two days ago.   Scheduled for follow up in 3 months.  Patient counseled to call for any signs/symptoms of CHF such as weight gain of 3+lbs, increased SOB, abdominal distention/bloating.      [x]Lab work obtained    [x] Patient/Family Educated On:  [x] HF zones (Green, Yellow, Red) and aware of when to take action   [x] Daily weights  [] Scale provided   [x] Low sodium diet (2000 mg)  Barriers to compliance  [] Refuses to monitor diet  [] Socioeconomic difficulties  [] Unable to cook for self (use of frozen meals, can goods, etc)  [] CHF CHW consulted  [] Low sodium meal delivery options given to patient  [] Dietician consulted   [] Low sodium recipes provided  [] Sodium free seasoning provided   [x] Fluid intake 6-8

## 2024-04-22 ENCOUNTER — NURSE ONLY (OUTPATIENT)
Dept: NON INVASIVE DIAGNOSTICS | Age: 74
End: 2024-04-22
Payer: MEDICARE

## 2024-04-22 ENCOUNTER — OFFICE VISIT (OUTPATIENT)
Dept: NON INVASIVE DIAGNOSTICS | Age: 74
End: 2024-04-22
Payer: MEDICARE

## 2024-04-22 VITALS
HEIGHT: 66 IN | RESPIRATION RATE: 16 BRPM | OXYGEN SATURATION: 97 % | WEIGHT: 185 LBS | SYSTOLIC BLOOD PRESSURE: 126 MMHG | HEART RATE: 76 BPM | DIASTOLIC BLOOD PRESSURE: 70 MMHG | BODY MASS INDEX: 29.73 KG/M2

## 2024-04-22 DIAGNOSIS — Z95.0 PACEMAKER: Primary | ICD-10-CM

## 2024-04-22 DIAGNOSIS — I10 HTN (HYPERTENSION), BENIGN: Primary | Chronic | ICD-10-CM

## 2024-04-22 DIAGNOSIS — I49.3 PVC (PREMATURE VENTRICULAR CONTRACTION): ICD-10-CM

## 2024-04-22 PROCEDURE — 1123F ACP DISCUSS/DSCN MKR DOCD: CPT | Performed by: STUDENT IN AN ORGANIZED HEALTH CARE EDUCATION/TRAINING PROGRAM

## 2024-04-22 PROCEDURE — 1090F PRES/ABSN URINE INCON ASSESS: CPT | Performed by: STUDENT IN AN ORGANIZED HEALTH CARE EDUCATION/TRAINING PROGRAM

## 2024-04-22 PROCEDURE — 3078F DIAST BP <80 MM HG: CPT | Performed by: STUDENT IN AN ORGANIZED HEALTH CARE EDUCATION/TRAINING PROGRAM

## 2024-04-22 PROCEDURE — G8419 CALC BMI OUT NRM PARAM NOF/U: HCPCS | Performed by: STUDENT IN AN ORGANIZED HEALTH CARE EDUCATION/TRAINING PROGRAM

## 2024-04-22 PROCEDURE — 3074F SYST BP LT 130 MM HG: CPT | Performed by: STUDENT IN AN ORGANIZED HEALTH CARE EDUCATION/TRAINING PROGRAM

## 2024-04-22 PROCEDURE — 99214 OFFICE O/P EST MOD 30 MIN: CPT | Performed by: STUDENT IN AN ORGANIZED HEALTH CARE EDUCATION/TRAINING PROGRAM

## 2024-04-22 PROCEDURE — G8427 DOCREV CUR MEDS BY ELIG CLIN: HCPCS | Performed by: STUDENT IN AN ORGANIZED HEALTH CARE EDUCATION/TRAINING PROGRAM

## 2024-04-22 PROCEDURE — G8400 PT W/DXA NO RESULTS DOC: HCPCS | Performed by: STUDENT IN AN ORGANIZED HEALTH CARE EDUCATION/TRAINING PROGRAM

## 2024-04-22 PROCEDURE — 93000 ELECTROCARDIOGRAM COMPLETE: CPT | Performed by: STUDENT IN AN ORGANIZED HEALTH CARE EDUCATION/TRAINING PROGRAM

## 2024-04-22 PROCEDURE — 3017F COLORECTAL CA SCREEN DOC REV: CPT | Performed by: STUDENT IN AN ORGANIZED HEALTH CARE EDUCATION/TRAINING PROGRAM

## 2024-04-22 PROCEDURE — 1036F TOBACCO NON-USER: CPT | Performed by: STUDENT IN AN ORGANIZED HEALTH CARE EDUCATION/TRAINING PROGRAM

## 2024-04-22 NOTE — PROGRESS NOTES
Patient was seen today and a 7 day monitor was placed. Monitor was ordered by Dr. Servando Hernandez. The monitor was applied. Instructions were given to the patient. Patient stated understanding and gave verbalize feed back.     Serial number [id number]      Hope Dave MA   
LBBB, V3 transition)  Device Interrogation/Reprogramming        Assessment/Plan:  1. vasovagal syncopal-cardiac depressor sp MDT dc PPM (DOI: 8/8/16)  -In 8/2016, she was admitted for syncope during toilet use. She had a recurrence of syncope during toilet use during admit, which was associated with 18 second episode of sinus arrest. No recurrence of syncope since PPM implant. Device interrogation today with 18 rate drop responses.  -Since 11/2016, she has been noted to have elevated sensing integrity counter (SIC) > 300 from last interrogation, which can be an indicator of system dysfunction requiring intervention. However, as she had otherwise stable lead function, this was managed by Jada Balderrama and Denise without intervention. SIC typically due to oversensing of electrical noise, but also from oversensing of P- and T-waves and diaphragmatic potentials.  -Provactive maneuvers previously reported possible lead noise, but not appreciated in 2022.  -PA and LAT CXR revealed stable device position and no evidence of lead breakdown.  -Device interrogation reports stable lead function since at least 4/2020 based on available trends.   -RV sensitivity adjusted in 2022 to potentially address elevated SIC count.  However, SIC count remains elevated with otherwise stable lead function.  -Rate drop response adjusted today (sensitivity: 1 minute - >30 seconds).  -Continue remote monitoring every 91 days.  -Follow-up with my office in 3 months.    2.  PVC  - Ventricular bigeminy today.  - Recommend 7-day ZIO XT monitor.  - Recommend TTE.    3. 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  Please call me if there are any questions.      Servando Hernandez D.O.  Cardiac Electrophysiology  [facility]  [facility]    CC: Dr Villalobos

## 2024-04-22 NOTE — PATIENT INSTRUCTIONS
No medication changes today.  Cardiac monitor applied today. Wear for 7 days. Return via mail.  Please call to schedule echocardiogram.  Continue remote monitoring of pacemaker every 91 days.  Follow-up with this office in ~3 months.

## 2024-05-08 DIAGNOSIS — I49.3 PVC (PREMATURE VENTRICULAR CONTRACTION): ICD-10-CM

## 2024-05-10 ENCOUNTER — TELEPHONE (OUTPATIENT)
Dept: NON INVASIVE DIAGNOSTICS | Age: 74
End: 2024-05-10

## 2024-05-10 NOTE — TELEPHONE ENCOUNTER
----- Message from Servando Hernandez DO sent at 5/8/2024 12:39 PM EDT -----  Regarding: monitor  monitor: patient events during sinus with VE, VE burden = 24.2% (predominantly single morphology),     Await TTE.    Follow-up as previously instructed.    -Servando Hernandez DO

## 2024-05-13 NOTE — TELEPHONE ENCOUNTER
Pt notified of results and verbalized understanding.    Electronically signed by Brenda Vincent MA on 5/13/2024 at 9:59 AM

## 2024-05-16 PROCEDURE — 93280 PM DEVICE PROGR EVAL DUAL: CPT | Performed by: STUDENT IN AN ORGANIZED HEALTH CARE EDUCATION/TRAINING PROGRAM

## 2024-05-20 ENCOUNTER — PROCEDURE VISIT (OUTPATIENT)
Dept: PODIATRY | Age: 74
End: 2024-05-20
Payer: MEDICARE

## 2024-05-20 VITALS — WEIGHT: 185 LBS | BODY MASS INDEX: 29.73 KG/M2 | HEIGHT: 66 IN

## 2024-05-20 DIAGNOSIS — E11.51 TYPE II DIABETES MELLITUS WITH PERIPHERAL CIRCULATORY DISORDER (HCC): ICD-10-CM

## 2024-05-20 DIAGNOSIS — I73.9 PVD (PERIPHERAL VASCULAR DISEASE) (HCC): ICD-10-CM

## 2024-05-20 DIAGNOSIS — E11.42 DIABETIC POLYNEUROPATHY ASSOCIATED WITH TYPE 2 DIABETES MELLITUS (HCC): ICD-10-CM

## 2024-05-20 DIAGNOSIS — R26.2 DIFFICULTY WALKING: ICD-10-CM

## 2024-05-20 DIAGNOSIS — I87.2 VENOUS INSUFFICIENCY (CHRONIC) (PERIPHERAL): ICD-10-CM

## 2024-05-20 DIAGNOSIS — B35.1 ONYCHOMYCOSIS: Primary | ICD-10-CM

## 2024-05-20 PROCEDURE — 99999 PR OFFICE/OUTPT VISIT,PROCEDURE ONLY: CPT | Performed by: PODIATRIST

## 2024-05-20 PROCEDURE — 11721 DEBRIDE NAIL 6 OR MORE: CPT | Performed by: PODIATRIST

## 2024-05-20 NOTE — PROGRESS NOTES
Patient here for nail care. Yuri Villalobos MD   Electronically signed by Adrianne Bergman LPN on 5/20/2024 at 11:37 AM

## 2024-05-20 NOTE — PROGRESS NOTES
24     Dilcia Mcadams    : 1950  Sex: female  Age: 73 y.o.    Subjective:  The patient is seen today for evaluation regarding diabetic foot evaluation and mycotic nail care.  No other complaints noted.    Chief Complaint   Patient presents with    Foot Pain     Nail care       Current Medications:    Current Outpatient Medications:     ipratropium 0.5 mg-albuterol 2.5 mg (DUONEB) 0.5-2.5 (3) MG/3ML SOLN nebulizer solution, INHALE THE CONTENTS OF 1 VIAL (3 ML) AS INSTRUCTED VIA NEBULIZER EVERY 4 HOURS AS NEEDED FOR WHEEZING / SHORTNESS OF BREATH, Disp: , Rfl:     SENNA CO, by Combination route as needed, Disp: , Rfl:     HYDROcodone-acetaminophen (NORCO) 5-325 MG per tablet, Take 1 tablet by mouth every 6 hours as needed for Pain., Disp: , Rfl:     glipiZIDE (GLUCOTROL) 5 MG tablet, Take 1.5 tablets by mouth 2 times daily (before meals), Disp: , Rfl:     Budeson-Glycopyrrol-Formoterol (BREZTRI AEROSPHERE) 160-9-4.8 MCG/ACT AERO, Inhale 2 puffs into the lungs 2 times daily, Disp: , Rfl:     LORazepam (ATIVAN) 0.5 MG tablet, Take 3 tablets by mouth at bedtime., Disp: , Rfl:     spironolactone (ALDACTONE) 25 MG tablet, Take 1 tablet by mouth at bedtime Takes at 1 am, Disp: , Rfl:     albuterol sulfate HFA (PROVENTIL;VENTOLIN;PROAIR) 108 (90 Base) MCG/ACT inhaler, Inhale 2 puffs into the lungs every 6 hours as needed for Wheezing, Disp: , Rfl:     OXYGEN, Inhale 4 L into the lungs nightly as needed Nightly, Disp: , Rfl:     promethazine (PHENERGAN) 25 MG tablet, Take 1 tablet by mouth every 8 hours as needed, Disp: , Rfl:     torsemide (DEMADEX) 20 MG tablet, Take 2 tablets by mouth daily, Disp: , Rfl:     Polyethylene Glycol 3350 (MIRALAX PO), Take by mouth as needed, Disp: , Rfl:     gabapentin (NEURONTIN) 300 MG capsule, Take 1 capsule by mouth nightly., Disp: , Rfl:     Melatonin 10 MG TABS, Take by mouth nightly , Disp: , Rfl:     Allergies:  Allergies   Allergen Reactions    Penicillins Anaphylaxis

## 2024-06-26 ENCOUNTER — TELEPHONE (OUTPATIENT)
Dept: NON INVASIVE DIAGNOSTICS | Age: 74
End: 2024-06-26

## 2024-07-12 ENCOUNTER — TELEPHONE (OUTPATIENT)
Dept: CARDIOLOGY | Age: 74
End: 2024-07-12

## 2024-07-12 NOTE — TELEPHONE ENCOUNTER
Patient called to cancel echo appt that was scheduled for this morning. Not feeling well. Patient states she will call at a later date.    Electronically signed by Aury Suarez on 7/12/2024 at 8:38 AM

## 2024-07-16 ENCOUNTER — HOSPITAL ENCOUNTER (OUTPATIENT)
Dept: OTHER | Age: 74
Setting detail: THERAPIES SERIES
Discharge: HOME | End: 2024-07-16
Payer: MEDICARE

## 2024-07-16 VITALS
HEART RATE: 87 BPM | BODY MASS INDEX: 30.67 KG/M2 | SYSTOLIC BLOOD PRESSURE: 140 MMHG | DIASTOLIC BLOOD PRESSURE: 70 MMHG | OXYGEN SATURATION: 96 % | WEIGHT: 190 LBS | RESPIRATION RATE: 16 BRPM

## 2024-07-16 LAB
ANION GAP SERPL CALCULATED.3IONS-SCNC: 14 MMOL/L (ref 7–16)
BNP SERPL-MCNC: 413 PG/ML (ref 0–125)
BUN SERPL-MCNC: 37 MG/DL (ref 6–23)
CALCIUM SERPL-MCNC: 9.4 MG/DL (ref 8.6–10.2)
CHLORIDE SERPL-SCNC: 97 MMOL/L (ref 98–107)
CO2 SERPL-SCNC: 26 MMOL/L (ref 22–29)
CREAT SERPL-MCNC: 1.4 MG/DL (ref 0.5–1)
GFR, ESTIMATED: 39 ML/MIN/1.73M2
GLUCOSE SERPL-MCNC: 129 MG/DL (ref 74–99)
POTASSIUM SERPL-SCNC: 4.1 MMOL/L (ref 3.5–5)
SODIUM SERPL-SCNC: 137 MMOL/L (ref 132–146)

## 2024-07-16 PROCEDURE — 83880 ASSAY OF NATRIURETIC PEPTIDE: CPT

## 2024-07-16 PROCEDURE — 80048 BASIC METABOLIC PNL TOTAL CA: CPT

## 2024-07-16 PROCEDURE — 6360000002 HC RX W HCPCS

## 2024-07-16 PROCEDURE — 99214 OFFICE O/P EST MOD 30 MIN: CPT

## 2024-07-16 PROCEDURE — 36415 COLL VENOUS BLD VENIPUNCTURE: CPT

## 2024-07-16 PROCEDURE — 96374 THER/PROPH/DIAG INJ IV PUSH: CPT

## 2024-07-16 PROCEDURE — 2580000003 HC RX 258: Performed by: INTERNAL MEDICINE

## 2024-07-16 RX ORDER — FUROSEMIDE 10 MG/ML
INJECTION INTRAMUSCULAR; INTRAVENOUS
Status: COMPLETED
Start: 2024-07-16 | End: 2024-07-16

## 2024-07-16 RX ORDER — SODIUM CHLORIDE 0.9 % (FLUSH) 0.9 %
10 SYRINGE (ML) INJECTION ONCE
Status: COMPLETED | OUTPATIENT
Start: 2024-07-16 | End: 2024-07-16

## 2024-07-16 RX ORDER — FUROSEMIDE 10 MG/ML
40 INJECTION INTRAMUSCULAR; INTRAVENOUS ONCE
Status: COMPLETED | OUTPATIENT
Start: 2024-07-16 | End: 2024-07-16

## 2024-07-16 RX ADMIN — FUROSEMIDE 40 MG: 10 INJECTION INTRAMUSCULAR; INTRAVENOUS at 13:31

## 2024-07-16 RX ADMIN — FUROSEMIDE 40 MG: 10 INJECTION, SOLUTION INTRAMUSCULAR; INTRAVENOUS at 13:31

## 2024-07-16 RX ADMIN — SODIUM CHLORIDE, PRESERVATIVE FREE 10 ML: 5 INJECTION INTRAVENOUS at 13:32

## 2024-07-16 NOTE — PROGRESS NOTES
Pattern: Regular  Respiratory Depth: Normal  Respiratory Quality/Effort: Dyspnea with exertion  Chest Assessment: Chest expansion symmetrical, Trachea midline  L Breath Sounds: Diminished, Clear  R Breath Sounds: Diminished, Clear  Breath Sounds  Respiratory Pattern: Regular  Right Upper Lobe: Clear  Right Middle Lobe: Clear  Right Lower Lobe: Diminished  Left Upper Lobe: Clear  Left Lower Lobe: Diminished     Cardiac  Cardiac Rhythm: Sinus rhythm  Rhythm Interpretation  Cardiac Rhythm: Sinus rhythm  Pulse: 87     Gastrointestinal  Abdominal (WDL): Within Defined Limits  Abdomen Inspection: Rounded, Soft         Peripheral Vascular  Peripheral Vascular (WDL): Within Defined Limits  RLE Edema: None  LLE Edema: None           Genitourinary  Genitourinary (WDL): Within Defined Limits  Psychosocial  Psychosocial (WDL): Within Defined Limits              Pulse: 87             LAB DATA:  BMP:  Sodium (mmol/L)   Date Value   07/16/2024 137   04/16/2024 136   02/21/2024 136     Potassium (mmol/L)   Date Value   07/16/2024 4.1   04/16/2024 4.2   02/21/2024 3.8     Potassium reflex Magnesium (mmol/L)   Date Value   11/04/2022 3.7   11/03/2022 3.1 (L)   11/02/2022 3.4 (L)     Chloride (mmol/L)   Date Value   07/16/2024 97 (L)   04/16/2024 93 (L)   02/21/2024 95 (L)     CO2 (mmol/L)   Date Value   07/16/2024 26   04/16/2024 29   02/21/2024 30 (H)     BUN (mg/dL)   Date Value   07/16/2024 37 (H)   04/16/2024 45 (H)   02/21/2024 29 (H)     Creatinine (mg/dL)   Date Value   07/16/2024 1.4 (H)   04/16/2024 1.7 (H)   02/21/2024 1.5 (H)     Glucose (mg/dL)   Date Value   07/16/2024 129 (H)   04/16/2024 126 (H)   02/21/2024 134 (H)     Calcium (mg/dL)   Date Value   07/16/2024 9.4   04/16/2024 9.9   02/21/2024 9.4     BNP:  Pro-BNP (pg/mL)   Date Value   07/16/2024 413 (H)   04/16/2024 397 (H)   02/21/2024 515 (H)      CBC:  WBC (k/uL)   Date Value   09/06/2023 13.4 (H)     Hemoglobin (g/dL)   Date Value   09/06/2023 11.7

## 2024-07-30 ENCOUNTER — HOSPITAL ENCOUNTER (OUTPATIENT)
Dept: OTHER | Age: 74
Setting detail: THERAPIES SERIES
Discharge: HOME | End: 2024-07-30
Payer: MEDICARE

## 2024-07-30 VITALS
OXYGEN SATURATION: 94 % | DIASTOLIC BLOOD PRESSURE: 54 MMHG | SYSTOLIC BLOOD PRESSURE: 139 MMHG | HEART RATE: 82 BPM | RESPIRATION RATE: 18 BRPM

## 2024-07-30 LAB
ANION GAP SERPL CALCULATED.3IONS-SCNC: 10 MMOL/L (ref 7–16)
BNP SERPL-MCNC: 526 PG/ML (ref 0–125)
BUN SERPL-MCNC: 50 MG/DL (ref 6–23)
CALCIUM SERPL-MCNC: 9.4 MG/DL (ref 8.6–10.2)
CHLORIDE SERPL-SCNC: 98 MMOL/L (ref 98–107)
CO2 SERPL-SCNC: 29 MMOL/L (ref 22–29)
CREAT SERPL-MCNC: 1.6 MG/DL (ref 0.5–1)
GFR, ESTIMATED: 35 ML/MIN/1.73M2
GLUCOSE SERPL-MCNC: 181 MG/DL (ref 74–99)
POTASSIUM SERPL-SCNC: 3.9 MMOL/L (ref 3.5–5)
SODIUM SERPL-SCNC: 137 MMOL/L (ref 132–146)

## 2024-07-30 PROCEDURE — 80048 BASIC METABOLIC PNL TOTAL CA: CPT

## 2024-07-30 PROCEDURE — 36415 COLL VENOUS BLD VENIPUNCTURE: CPT

## 2024-07-30 PROCEDURE — 99214 OFFICE O/P EST MOD 30 MIN: CPT

## 2024-07-30 PROCEDURE — 83880 ASSAY OF NATRIURETIC PEPTIDE: CPT

## 2024-07-30 NOTE — PROGRESS NOTES
Congestive Heart Failure Clinic   [facility]      Referring Provider: Dr Radha Reina  Primary Care Physician: Yuri Villalobos MD   Cardiologist: Dr Radha Reina  Nephrologist: N/A      HISTORY OF PRESENT ILLNESS:     Dilcia Mcadams is a 73 y.o. (1950) female with a history of HFpEF (EF> 50%), most recent EF:63% on 6-3-21  Lab Results   Component Value Date    LVEF 63 06/03/2021    LVEFMODE Echo 08/06/2016       Patient presents to the CHF clinic for ongoing evaluation and monitoring of heart failure.  In the CHF clinic today she denies any adverse symptoms except:  [] Dizziness or lightheadedness   [] Syncope or near syncope  [] Recent Fall  [] Shortness of breath at rest   [x] Dyspnea with exertion  [] Decline in functional capacity (unable to perform activities they had previously been able to do)  [] Fatigue   [] Orthopnea  [] PND  [] Nocturnal cough  [] Hemoptysis  [] Chest pain, pressure, or discomfort  [] Palpitations  [] Abdominal distention  [] Abdominal bloating  [] Early satiety  [] Blood in stool   [] Diarrhea  [] Constipation  [] Nausea/Vomiting  [] Decreased urinary response to oral diuretic   [] Scrotal swelling   [] Lower extremity edema  [] Used PRN doses of oral diuretic   [] Weight gain    Wt Readings from Last 3 Encounters:   07/16/24 86.2 kg (190 lb)   05/20/24 83.9 kg (185 lb)   04/22/24 83.9 kg (185 lb)     SOCIAL HISTORY:  [x] Denies tobacco, alcohol or illicit drug abuse  [] Tobacco use:  [] ETOH use:  [] Illicit drug use:        MEDICATIONS:    Allergies   Allergen Reactions    Penicillins Anaphylaxis and Shortness Of Breath    Sulfa Antibiotics Itching and Swelling     Prior to Visit Medications    Medication Sig Taking? Authorizing Provider   ipratropium 0.5 mg-albuterol 2.5 mg (DUONEB) 0.5-2.5 (3) MG/3ML SOLN nebulizer solution INHALE THE CONTENTS OF 1 VIAL (3 ML) AS INSTRUCTED VIA NEBULIZER EVERY 4 HOURS AS NEEDED FOR WHEEZING /

## 2024-08-13 ENCOUNTER — HOSPITAL ENCOUNTER (OUTPATIENT)
Dept: CARDIOLOGY | Age: 74
Discharge: HOME | End: 2024-08-15
Attending: STUDENT IN AN ORGANIZED HEALTH CARE EDUCATION/TRAINING PROGRAM
Payer: MEDICARE

## 2024-08-13 VITALS
HEIGHT: 66 IN | BODY MASS INDEX: 30.53 KG/M2 | DIASTOLIC BLOOD PRESSURE: 54 MMHG | SYSTOLIC BLOOD PRESSURE: 139 MMHG | WEIGHT: 190 LBS

## 2024-08-13 DIAGNOSIS — I49.3 PVC (PREMATURE VENTRICULAR CONTRACTION): ICD-10-CM

## 2024-08-13 LAB
ECHO AO ASC DIAM: 2.8 CM
ECHO AO ASCENDING AORTA INDEX: 1.43 CM/M2
ECHO AV AREA PEAK VELOCITY: 2.1 CM2
ECHO AV AREA VTI: 2.7 CM2
ECHO AV AREA/BSA PEAK VELOCITY: 1.1 CM2/M2
ECHO AV AREA/BSA VTI: 1.4 CM2/M2
ECHO AV CUSP MM: 2 CM
ECHO AV MEAN GRADIENT: 4 MMHG
ECHO AV MEAN VELOCITY: 1 M/S
ECHO AV PEAK GRADIENT: 8 MMHG
ECHO AV PEAK VELOCITY: 1.5 M/S
ECHO AV VELOCITY RATIO: 0.6
ECHO AV VTI: 32.4 CM
ECHO BSA: 2 M2
ECHO EST RA PRESSURE: 3 MMHG
ECHO LA DIAMETER INDEX: 1.94 CM/M2
ECHO LA DIAMETER: 3.8 CM
ECHO LA VOL A-L A2C: 46 ML (ref 22–52)
ECHO LA VOL A-L A4C: 72 ML (ref 22–52)
ECHO LA VOL MOD A2C: 44 ML (ref 22–52)
ECHO LA VOL MOD A4C: 63 ML (ref 22–52)
ECHO LA VOLUME AREA LENGTH: 62 ML
ECHO LA VOLUME INDEX A-L A2C: 23 ML/M2 (ref 16–34)
ECHO LA VOLUME INDEX A-L A4C: 37 ML/M2 (ref 16–34)
ECHO LA VOLUME INDEX AREA LENGTH: 32 ML/M2 (ref 16–34)
ECHO LA VOLUME INDEX MOD A2C: 22 ML/M2 (ref 16–34)
ECHO LA VOLUME INDEX MOD A4C: 32 ML/M2 (ref 16–34)
ECHO LV EF PHYSICIAN: 60 %
ECHO LV FRACTIONAL SHORTENING: 30 % (ref 28–44)
ECHO LV INTERNAL DIMENSION DIASTOLE INDEX: 2.19 CM/M2
ECHO LV INTERNAL DIMENSION DIASTOLIC: 4.3 CM (ref 3.9–5.3)
ECHO LV INTERNAL DIMENSION SYSTOLIC INDEX: 1.53 CM/M2
ECHO LV INTERNAL DIMENSION SYSTOLIC: 3 CM
ECHO LV ISOVOLUMETRIC RELAXATION TIME (IVRT): 110.7 MS
ECHO LV IVSD: 1.3 CM (ref 0.6–0.9)
ECHO LV IVSS: 1.5 CM
ECHO LV MASS 2D: 196.1 G (ref 67–162)
ECHO LV MASS INDEX 2D: 100 G/M2 (ref 43–95)
ECHO LV POSTERIOR WALL DIASTOLIC: 1.2 CM (ref 0.6–0.9)
ECHO LV POSTERIOR WALL SYSTOLIC: 1.5 CM
ECHO LV RELATIVE WALL THICKNESS RATIO: 0.56
ECHO LVOT AREA: 3.5 CM2
ECHO LVOT AV VTI INDEX: 0.77
ECHO LVOT DIAM: 2.1 CM
ECHO LVOT MEAN GRADIENT: 2 MMHG
ECHO LVOT PEAK GRADIENT: 3 MMHG
ECHO LVOT PEAK VELOCITY: 0.9 M/S
ECHO LVOT STROKE VOLUME INDEX: 43.8 ML/M2
ECHO LVOT SV: 85.9 ML
ECHO LVOT VTI: 24.8 CM
ECHO MV "A" WAVE DURATION: 124.6 MSEC
ECHO MV A VELOCITY: 1.36 M/S
ECHO MV AREA PHT: 2.7 CM2
ECHO MV AREA VTI: 2.3 CM2
ECHO MV E DECELERATION TIME (DT): 257 MS
ECHO MV E VELOCITY: 1.02 M/S
ECHO MV E/A RATIO: 0.75
ECHO MV LVOT VTI INDEX: 1.52
ECHO MV MAX VELOCITY: 1.8 M/S
ECHO MV MEAN GRADIENT: 4 MMHG
ECHO MV MEAN VELOCITY: 0.9 M/S
ECHO MV PEAK GRADIENT: 14 MMHG
ECHO MV PRESSURE HALF TIME (PHT): 81.3 MS
ECHO MV VTI: 37.7 CM
ECHO PV MAX VELOCITY: 0.9 M/S
ECHO PV MEAN GRADIENT: 2 MMHG
ECHO PV MEAN VELOCITY: 0.6 M/S
ECHO PV PEAK GRADIENT: 3 MMHG
ECHO PV VTI: 19.3 CM
ECHO RIGHT VENTRICULAR SYSTOLIC PRESSURE (RVSP): 49 MMHG
ECHO RV INTERNAL DIMENSION: 3.4 CM
ECHO TV REGURGITANT MAX VELOCITY: 3.38 M/S
ECHO TV REGURGITANT PEAK GRADIENT: 46 MMHG

## 2024-08-13 PROCEDURE — 93306 TTE W/DOPPLER COMPLETE: CPT | Performed by: INTERNAL MEDICINE

## 2024-08-13 PROCEDURE — 93306 TTE W/DOPPLER COMPLETE: CPT

## 2024-08-14 ENCOUNTER — TELEPHONE (OUTPATIENT)
Dept: NON INVASIVE DIAGNOSTICS | Age: 74
End: 2024-08-14

## 2024-08-14 NOTE — TELEPHONE ENCOUNTER
----- Message from Dr. Servando Hernandez DO sent at 8/13/2024  9:13 PM EDT -----  Regarding: TTE  TTE (8/13/24): LVEF = 60%, mild MR, mild TR, and trivial pericardial effusion.    Follow-up as scheduled.    -Servando Hernandez DO

## 2024-09-09 ENCOUNTER — PROCEDURE VISIT (OUTPATIENT)
Dept: PODIATRY | Age: 74
End: 2024-09-09
Payer: MEDICARE

## 2024-09-09 VITALS — BODY MASS INDEX: 30.53 KG/M2 | WEIGHT: 190 LBS | HEIGHT: 66 IN

## 2024-09-09 DIAGNOSIS — R26.2 DIFFICULTY WALKING: ICD-10-CM

## 2024-09-09 DIAGNOSIS — I87.2 VENOUS INSUFFICIENCY (CHRONIC) (PERIPHERAL): ICD-10-CM

## 2024-09-09 DIAGNOSIS — E11.42 DIABETIC POLYNEUROPATHY ASSOCIATED WITH TYPE 2 DIABETES MELLITUS (HCC): ICD-10-CM

## 2024-09-09 DIAGNOSIS — I73.9 PVD (PERIPHERAL VASCULAR DISEASE) (HCC): ICD-10-CM

## 2024-09-09 DIAGNOSIS — E11.51 TYPE II DIABETES MELLITUS WITH PERIPHERAL CIRCULATORY DISORDER (HCC): ICD-10-CM

## 2024-09-09 DIAGNOSIS — B35.1 ONYCHOMYCOSIS: Primary | ICD-10-CM

## 2024-09-09 PROCEDURE — 11721 DEBRIDE NAIL 6 OR MORE: CPT | Performed by: PODIATRIST

## 2024-09-09 PROCEDURE — 99999 PR OFFICE/OUTPT VISIT,PROCEDURE ONLY: CPT | Performed by: PODIATRIST

## 2024-09-10 ENCOUNTER — OFFICE VISIT (OUTPATIENT)
Dept: NON INVASIVE DIAGNOSTICS | Age: 74
End: 2024-09-10
Payer: MEDICARE

## 2024-09-10 VITALS
HEIGHT: 66 IN | SYSTOLIC BLOOD PRESSURE: 128 MMHG | DIASTOLIC BLOOD PRESSURE: 68 MMHG | BODY MASS INDEX: 30.05 KG/M2 | RESPIRATION RATE: 18 BRPM | HEART RATE: 95 BPM | WEIGHT: 187 LBS | OXYGEN SATURATION: 96 %

## 2024-09-10 DIAGNOSIS — I49.3 FREQUENT PVCS: ICD-10-CM

## 2024-09-10 DIAGNOSIS — R09.89 PVC (PULMONARY VENOUS CONGESTION): ICD-10-CM

## 2024-09-10 DIAGNOSIS — Z95.0 PACEMAKER: Primary | ICD-10-CM

## 2024-09-10 PROCEDURE — 3078F DIAST BP <80 MM HG: CPT | Performed by: NURSE PRACTITIONER

## 2024-09-10 PROCEDURE — G8427 DOCREV CUR MEDS BY ELIG CLIN: HCPCS | Performed by: NURSE PRACTITIONER

## 2024-09-10 PROCEDURE — 1123F ACP DISCUSS/DSCN MKR DOCD: CPT | Performed by: NURSE PRACTITIONER

## 2024-09-10 PROCEDURE — G8417 CALC BMI ABV UP PARAM F/U: HCPCS | Performed by: NURSE PRACTITIONER

## 2024-09-10 PROCEDURE — 3017F COLORECTAL CA SCREEN DOC REV: CPT | Performed by: NURSE PRACTITIONER

## 2024-09-10 PROCEDURE — 93000 ELECTROCARDIOGRAM COMPLETE: CPT | Performed by: STUDENT IN AN ORGANIZED HEALTH CARE EDUCATION/TRAINING PROGRAM

## 2024-09-10 PROCEDURE — 1036F TOBACCO NON-USER: CPT | Performed by: NURSE PRACTITIONER

## 2024-09-10 PROCEDURE — G8400 PT W/DXA NO RESULTS DOC: HCPCS | Performed by: NURSE PRACTITIONER

## 2024-09-10 PROCEDURE — 3074F SYST BP LT 130 MM HG: CPT | Performed by: NURSE PRACTITIONER

## 2024-09-10 PROCEDURE — 99214 OFFICE O/P EST MOD 30 MIN: CPT | Performed by: NURSE PRACTITIONER

## 2024-09-10 PROCEDURE — 1090F PRES/ABSN URINE INCON ASSESS: CPT | Performed by: NURSE PRACTITIONER

## 2024-10-01 ENCOUNTER — TELEPHONE (OUTPATIENT)
Dept: OTHER | Age: 74
End: 2024-10-01

## 2024-10-01 NOTE — TELEPHONE ENCOUNTER
Received a call from patient regarding being up 3 lbs in weight since the 9/23/24.  Offered patient multiple appointments but patient declined.  Per patient she will continue to monitor weight and if she needs to come to the clinic she will give us a call.

## 2024-11-11 ENCOUNTER — HOSPITAL ENCOUNTER (OUTPATIENT)
Dept: OTHER | Age: 74
Setting detail: THERAPIES SERIES
Discharge: HOME | End: 2024-11-11

## 2024-11-11 VITALS
BODY MASS INDEX: 30.34 KG/M2 | SYSTOLIC BLOOD PRESSURE: 128 MMHG | OXYGEN SATURATION: 98 % | DIASTOLIC BLOOD PRESSURE: 70 MMHG | HEART RATE: 81 BPM | WEIGHT: 188 LBS | RESPIRATION RATE: 22 BRPM

## 2024-11-11 LAB
ANION GAP SERPL CALCULATED.3IONS-SCNC: 13 MMOL/L (ref 7–16)
BNP SERPL-MCNC: 549 PG/ML (ref 0–450)
BUN SERPL-MCNC: 33 MG/DL (ref 6–23)
CALCIUM SERPL-MCNC: 10 MG/DL (ref 8.6–10.2)
CHLORIDE SERPL-SCNC: 93 MMOL/L (ref 98–107)
CO2 SERPL-SCNC: 31 MMOL/L (ref 22–29)
CREAT SERPL-MCNC: 1.7 MG/DL (ref 0.5–1)
GFR, ESTIMATED: 32 ML/MIN/1.73M2
GLUCOSE SERPL-MCNC: 213 MG/DL (ref 74–99)
POTASSIUM SERPL-SCNC: 3.7 MMOL/L (ref 3.5–5)
SODIUM SERPL-SCNC: 137 MMOL/L (ref 132–146)

## 2024-11-11 PROCEDURE — 99214 OFFICE O/P EST MOD 30 MIN: CPT

## 2024-11-11 PROCEDURE — 2580000003 HC RX 258: Performed by: INTERNAL MEDICINE

## 2024-11-11 PROCEDURE — 80048 BASIC METABOLIC PNL TOTAL CA: CPT

## 2024-11-11 PROCEDURE — 83880 ASSAY OF NATRIURETIC PEPTIDE: CPT

## 2024-11-11 PROCEDURE — 6360000002 HC RX W HCPCS

## 2024-11-11 PROCEDURE — 36415 COLL VENOUS BLD VENIPUNCTURE: CPT

## 2024-11-11 PROCEDURE — 96374 THER/PROPH/DIAG INJ IV PUSH: CPT

## 2024-11-11 RX ORDER — SODIUM CHLORIDE 0.9 % (FLUSH) 0.9 %
10 SYRINGE (ML) INJECTION ONCE
Status: COMPLETED | OUTPATIENT
Start: 2024-11-11 | End: 2024-11-11

## 2024-11-11 RX ORDER — FUROSEMIDE 10 MG/ML
40 INJECTION INTRAMUSCULAR; INTRAVENOUS ONCE
Status: COMPLETED | OUTPATIENT
Start: 2024-11-11 | End: 2024-11-11

## 2024-11-11 RX ORDER — BISACODYL 5 MG/1
5 TABLET, DELAYED RELEASE ORAL DAILY PRN
COMMUNITY

## 2024-11-11 RX ORDER — FUROSEMIDE 10 MG/ML
INJECTION INTRAMUSCULAR; INTRAVENOUS
Status: COMPLETED
Start: 2024-11-11 | End: 2024-11-11

## 2024-11-11 RX ADMIN — FUROSEMIDE 40 MG: 10 INJECTION, SOLUTION INTRAMUSCULAR; INTRAVENOUS at 13:27

## 2024-11-11 RX ADMIN — SODIUM CHLORIDE, PRESERVATIVE FREE 10 ML: 5 INJECTION INTRAVENOUS at 13:28

## 2024-11-11 RX ADMIN — FUROSEMIDE 40 MG: 10 INJECTION INTRAMUSCULAR; INTRAVENOUS at 13:27

## 2024-11-11 ASSESSMENT — PATIENT HEALTH QUESTIONNAIRE - PHQ9
SUM OF ALL RESPONSES TO PHQ QUESTIONS 1-9: 0
1. LITTLE INTEREST OR PLEASURE IN DOING THINGS: NOT AT ALL
2. FEELING DOWN, DEPRESSED OR HOPELESS: NOT AT ALL
SUM OF ALL RESPONSES TO PHQ QUESTIONS 1-9: 0
SUM OF ALL RESPONSES TO PHQ9 QUESTIONS 1 & 2: 0

## 2024-11-11 NOTE — PROGRESS NOTES
Congestive Heart Failure Clinic   [facility]      Referring Provider: Dr Radha Reina  Primary Care Physician: Yuri Villalobos MD   Cardiologist: Dr Radha Reina  Nephrologist: N/A      HISTORY OF PRESENT ILLNESS:     Dilcia Mcadams is a 74 y.o. (1950) female with a history of HFpEF (EF> 50%), most recent EF:63% on 6-3-21  Lab Results   Component Value Date    LVEF 63 06/03/2021    LVEFMODE Echo 08/06/2016       Patient presents to the CHF clinic for ongoing evaluation and monitoring of heart failure.  In the CHF clinic today she denies any adverse symptoms except:  [] Dizziness or lightheadedness   [] Syncope or near syncope  [] Recent Fall  [x] Shortness of breath at rest   [x] Dyspnea with exertion  [] Decline in functional capacity (unable to perform activities they had previously been able to do)  [x] Fatigue   [] Orthopnea  [] PND  [x] Nocturnal cough  [] Hemoptysis  [] Chest pain, pressure, or discomfort  [] Palpitations  [x] Abdominal distention  [x] Abdominal bloating  [] Early satiety  [] Blood in stool   [] Diarrhea  [] Constipation  [x] Nausea/Vomiting-nausea  [] Decreased urinary response to oral diuretic   [] Scrotal swelling   [] Lower extremity edema  [] Used PRN doses of oral diuretic   [] Weight gain    Wt Readings from Last 3 Encounters:   11/11/24 85.3 kg (188 lb)   09/10/24 84.8 kg (187 lb)   09/09/24 86.2 kg (190 lb)     SOCIAL HISTORY:  [x] Denies tobacco, alcohol or illicit drug abuse  [] Tobacco use:  [] ETOH use:  [] Illicit drug use:        MEDICATIONS:    Allergies   Allergen Reactions    Penicillins Anaphylaxis and Shortness Of Breath    Sulfa Antibiotics Itching and Swelling     Prior to Visit Medications    Medication Sig Taking? Authorizing Provider   bisacodyl (DULCOLAX) 5 MG EC tablet Take 1 tablet by mouth daily as needed for Constipation Yes Provider, MD Liv   ipratropium 0.5 mg-albuterol 2.5 mg (DUONEB) 0.5-2.5 (3)

## 2024-11-25 ENCOUNTER — PROCEDURE VISIT (OUTPATIENT)
Dept: PODIATRY | Age: 74
End: 2024-11-25
Payer: MEDICARE

## 2024-11-25 VITALS
TEMPERATURE: 97.6 F | SYSTOLIC BLOOD PRESSURE: 138 MMHG | BODY MASS INDEX: 28.72 KG/M2 | HEIGHT: 67 IN | DIASTOLIC BLOOD PRESSURE: 86 MMHG | OXYGEN SATURATION: 98 % | WEIGHT: 183 LBS | HEART RATE: 87 BPM

## 2024-11-25 DIAGNOSIS — E11.51 TYPE II DIABETES MELLITUS WITH PERIPHERAL CIRCULATORY DISORDER (HCC): ICD-10-CM

## 2024-11-25 DIAGNOSIS — B35.1 ONYCHOMYCOSIS: Primary | ICD-10-CM

## 2024-11-25 DIAGNOSIS — E11.42 DIABETIC POLYNEUROPATHY ASSOCIATED WITH TYPE 2 DIABETES MELLITUS (HCC): ICD-10-CM

## 2024-11-25 DIAGNOSIS — I73.9 PVD (PERIPHERAL VASCULAR DISEASE) (HCC): ICD-10-CM

## 2024-11-25 DIAGNOSIS — I87.2 VENOUS INSUFFICIENCY (CHRONIC) (PERIPHERAL): ICD-10-CM

## 2024-11-25 DIAGNOSIS — R26.2 DIFFICULTY WALKING: ICD-10-CM

## 2024-11-25 PROCEDURE — 99999 PR OFFICE/OUTPT VISIT,PROCEDURE ONLY: CPT | Performed by: PODIATRIST

## 2024-11-25 PROCEDURE — 11721 DEBRIDE NAIL 6 OR MORE: CPT | Performed by: PODIATRIST

## 2024-11-25 NOTE — PROGRESS NOTES
Patient in today for nail care. Patient does not have any complaints of pain at this time. Patient's PCP is Yuri Villalobos MD date of last ov 5/13/24          Nikki Ramírez LPN     
Shortness Of Breath    Sulfa Antibiotics Itching and Swelling       Past Surgical History:   Procedure Laterality Date    APPENDECTOMY      ARM SURGERY Left 2022    EXCISION LEFT UPPER ARM LIPOMA performed by Joseph Michaels MD at [facility] OR    CARDIAC PACEMAKER PLACEMENT  2016    [employer]     SECTION      x3    CHOLECYSTECTOMY, LAPAROSCOPIC N/A 2022    LAPAROSCOPIC CHOLECYSTECTOMY POSSIBLE OPEN POSSIBLE GRAM performed by Joseph Michaels MD at [facility] OR    COLECTOMY N/A 2022    LAPAROSCOPIC SIGMOID COLON RESECTION performed by Joseph Michaels MD at [facility] OR    HYSTERECTOMY (CERVIX STATUS UNKNOWN)      LEG SURGERY Left 2021    LIPOMA RESECTION  2021    TONSILLECTOMY      TUBAL LIGATION       Past Medical History:   Diagnosis Date    Arthritis     Chronic pain     COPD (chronic obstructive pulmonary disease) ([facility])     Depression     Diabetes ([facility])     History of left shoulder fracture     Hx of blood clots     Patient reports \"blood clots in my feet\" about 18 months ago    Hypertension     Neuropathy     Pacemaker     last interrogation 22 Epic cardiology    PONV (postoperative nausea and vomiting)     Pulmonary hypertension ([facility])     Sinus arrest     SSS (sick sinus syndrome) ([facility])     Syncope and collapse        Vitals:    24 1137   BP: 138/86   Pulse: 87   Temp: 97.6 °F (36.4 °C)   SpO2: 98%   Weight: 83 kg (183 lb)   Height: 1.702 m (5' 7\")       Exam:  Pedal pulses diminished to palpation bilateral foot.  At this time the nail/s 1, 2, 5 right foot and nail/s 1, 2, 5 left foot are noted to be thickened, dystrophic and discolored with subungual debris present.  Paresthesias noted to both lower extremities.  Diminished hair growth is noted to both lower extremities.  Edema noted with both varicosities and stasis skin changes present bilaterally.  Coolness is noted to the digital regions to palpation.  Capillary fill time delayed digital areas bilateral foot.  No heel fissuring or

## 2024-12-02 ENCOUNTER — HOSPITAL ENCOUNTER (OUTPATIENT)
Dept: OTHER | Age: 74
Setting detail: THERAPIES SERIES
Discharge: HOME | End: 2024-12-02
Payer: MEDICARE

## 2024-12-02 VITALS
SYSTOLIC BLOOD PRESSURE: 138 MMHG | OXYGEN SATURATION: 98 % | DIASTOLIC BLOOD PRESSURE: 82 MMHG | WEIGHT: 186 LBS | BODY MASS INDEX: 29.13 KG/M2 | RESPIRATION RATE: 18 BRPM | HEART RATE: 86 BPM

## 2024-12-02 LAB
ANION GAP SERPL CALCULATED.3IONS-SCNC: 15 MMOL/L (ref 7–16)
BNP SERPL-MCNC: 503 PG/ML (ref 0–450)
BUN SERPL-MCNC: 22 MG/DL (ref 6–23)
CALCIUM SERPL-MCNC: 10 MG/DL (ref 8.6–10.2)
CHLORIDE SERPL-SCNC: 94 MMOL/L (ref 98–107)
CO2 SERPL-SCNC: 28 MMOL/L (ref 22–29)
CREAT SERPL-MCNC: 1.7 MG/DL (ref 0.5–1)
GFR, ESTIMATED: 30 ML/MIN/1.73M2
GLUCOSE SERPL-MCNC: 203 MG/DL (ref 74–99)
POTASSIUM SERPL-SCNC: 3.9 MMOL/L (ref 3.5–5)
SODIUM SERPL-SCNC: 137 MMOL/L (ref 132–146)

## 2024-12-02 PROCEDURE — 36415 COLL VENOUS BLD VENIPUNCTURE: CPT

## 2024-12-02 PROCEDURE — 80048 BASIC METABOLIC PNL TOTAL CA: CPT

## 2024-12-02 PROCEDURE — 99214 OFFICE O/P EST MOD 30 MIN: CPT

## 2024-12-02 PROCEDURE — 83880 ASSAY OF NATRIURETIC PEPTIDE: CPT

## 2024-12-02 NOTE — PROGRESS NOTES
Congestive Heart Failure Clinic   [facility]      Referring Provider: Dr Radha Reina  Primary Care Physician: Yuri Villalobos MD   Cardiologist: Dr Radha Reina  Nephrologist: N/A      HISTORY OF PRESENT ILLNESS:     Dilcia Mcadams is a 74 y.o. (1950) female with a history of HFpEF (EF> 50%), most recent EF:63% on 6-3-21  Lab Results   Component Value Date    LVEF 63 06/03/2021    LVEFMODE Echo 08/06/2016       Patient presents to the CHF clinic for ongoing evaluation and monitoring of heart failure.  In the CHF clinic today she denies any adverse symptoms except:  [x] Dizziness or lightheadedness   [] Syncope or near syncope  [] Recent Fall  [] Shortness of breath at rest   [x] Dyspnea with exertion  [] Decline in functional capacity (unable to perform activities they had previously been able to do)  [x] Fatigue   [] Orthopnea  [] PND  [] Nocturnal cough  [] Hemoptysis  [] Chest pain, pressure, or discomfort  [] Palpitations  [] Abdominal distention  [] Abdominal bloating  [x] Early satiety  [] Blood in stool   [] Diarrhea  [x] Constipation  [] Nausea/Vomiting  [] Decreased urinary response to oral diuretic   [] Scrotal swelling   [] Lower extremity edema  [] Used PRN doses of oral diuretic   [] Weight gain    Wt Readings from Last 3 Encounters:   12/02/24 84.4 kg (186 lb)   11/25/24 83 kg (183 lb)   11/11/24 85.3 kg (188 lb)     SOCIAL HISTORY:  [x] Denies tobacco, alcohol or illicit drug abuse  [] Tobacco use:  [] ETOH use:  [] Illicit drug use:        MEDICATIONS:    Allergies   Allergen Reactions    Penicillins Anaphylaxis and Shortness Of Breath    Sulfa Antibiotics Itching and Swelling     Prior to Visit Medications    Medication Sig Taking? Authorizing Provider   bisacodyl (DULCOLAX) 5 MG EC tablet Take 1 tablet by mouth daily as needed for Constipation Yes Provider, MD Liv   ipratropium 0.5 mg-albuterol 2.5 mg (DUONEB) 0.5-2.5 (3) MG/3ML SOLN

## 2025-01-20 ENCOUNTER — HOSPITAL ENCOUNTER (OUTPATIENT)
Dept: OTHER | Age: 75
Discharge: HOME | End: 2025-01-20

## 2025-02-03 PROCEDURE — 93296 REM INTERROG EVL PM/IDS: CPT | Performed by: STUDENT IN AN ORGANIZED HEALTH CARE EDUCATION/TRAINING PROGRAM

## 2025-02-03 PROCEDURE — 93294 REM INTERROG EVL PM/LDLS PM: CPT | Performed by: STUDENT IN AN ORGANIZED HEALTH CARE EDUCATION/TRAINING PROGRAM

## 2025-02-05 ENCOUNTER — TELEPHONE (OUTPATIENT)
Dept: NON INVASIVE DIAGNOSTICS | Age: 75
End: 2025-02-05

## 2025-02-05 ENCOUNTER — HOSPITAL ENCOUNTER (OUTPATIENT)
Dept: OTHER | Age: 75
Setting detail: THERAPIES SERIES
End: 2025-02-05
Payer: MEDICARE

## 2025-02-05 DIAGNOSIS — R00.2 PALPITATIONS: ICD-10-CM

## 2025-02-05 DIAGNOSIS — I47.20 VENTRICULAR TACHYCARDIA (HCC): ICD-10-CM

## 2025-02-05 DIAGNOSIS — I49.3 FREQUENT PVCS: ICD-10-CM

## 2025-02-05 DIAGNOSIS — I49.5 SSS (SICK SINUS SYNDROME) (HCC): ICD-10-CM

## 2025-02-05 DIAGNOSIS — R94.31 ABNORMAL ELECTROCARDIOGRAPHY: Primary | ICD-10-CM

## 2025-02-05 RX ORDER — METOPROLOL SUCCINATE 25 MG/1
25 TABLET, EXTENDED RELEASE ORAL DAILY
COMMUNITY
End: 2025-02-05 | Stop reason: SDUPTHER

## 2025-02-05 RX ORDER — METOPROLOL SUCCINATE 25 MG/1
25 TABLET, EXTENDED RELEASE ORAL DAILY
Qty: 30 TABLET | Refills: 3 | Status: SHIPPED | OUTPATIENT
Start: 2025-02-05

## 2025-02-05 NOTE — TELEPHONE ENCOUNTER
----- Message from Dr. Servando Hernandez DO sent at 2/3/2025 11:18 AM EST -----  Regarding: PVC/VT  Run of ventricular ectopy/VT on remote.    Recommend:  1.  Limited TTE.  2.  Pharmacologic nuclear stress.  3.  Metoprolol XL 25 mg daily.  4.  Labs: CBC, CMP, TSH, free T4, magnesium  5.  EP appointment after the above completed.    -Servando Hernandez DO

## 2025-02-05 NOTE — TELEPHONE ENCOUNTER
Spoke with patient and they verbalized understanding. Orders placed and patient will go get labwork in the next few days.  Patient will schedule echo/stress test.

## 2025-02-10 ENCOUNTER — HOSPITAL ENCOUNTER (OUTPATIENT)
Age: 75
Discharge: HOME | End: 2025-02-10
Payer: MEDICARE

## 2025-02-10 DIAGNOSIS — I49.3 FREQUENT PVCS: ICD-10-CM

## 2025-02-10 DIAGNOSIS — R00.2 PALPITATIONS: ICD-10-CM

## 2025-02-10 DIAGNOSIS — R09.89 PVC (PULMONARY VENOUS CONGESTION): ICD-10-CM

## 2025-02-10 LAB
ALBUMIN SERPL-MCNC: 4.6 G/DL (ref 3.5–5.2)
ALP SERPL-CCNC: 108 U/L (ref 35–104)
ALT SERPL-CCNC: 14 U/L (ref 0–32)
ANION GAP SERPL CALCULATED.3IONS-SCNC: 16 MMOL/L (ref 7–16)
AST SERPL-CCNC: 17 U/L (ref 0–31)
BASOPHILS # BLD: 0.03 K/UL (ref 0–0.2)
BASOPHILS NFR BLD: 0 % (ref 0–2)
BILIRUB SERPL-MCNC: 0.4 MG/DL (ref 0–1.2)
BUN SERPL-MCNC: 27 MG/DL (ref 6–23)
CALCIUM SERPL-MCNC: 10.4 MG/DL (ref 8.6–10.2)
CHLORIDE SERPL-SCNC: 92 MMOL/L (ref 98–107)
CO2 SERPL-SCNC: 30 MMOL/L (ref 22–29)
CREAT SERPL-MCNC: 2 MG/DL (ref 0.5–1)
EOSINOPHIL # BLD: 0.2 K/UL (ref 0.05–0.5)
EOSINOPHILS RELATIVE PERCENT: 2 % (ref 0–6)
ERYTHROCYTE [DISTWIDTH] IN BLOOD BY AUTOMATED COUNT: 11.8 % (ref 11.5–15)
GFR, ESTIMATED: 26 ML/MIN/1.73M2
GLUCOSE SERPL-MCNC: 225 MG/DL (ref 74–99)
HCT VFR BLD AUTO: 40.4 % (ref 34–48)
HGB BLD-MCNC: 13.5 G/DL (ref 11.5–15.5)
IMM GRANULOCYTES # BLD AUTO: 0.07 K/UL (ref 0–0.58)
IMM GRANULOCYTES NFR BLD: 1 % (ref 0–5)
LYMPHOCYTES NFR BLD: 2.27 K/UL (ref 1.5–4)
LYMPHOCYTES RELATIVE PERCENT: 18 % (ref 20–42)
MAGNESIUM SERPL-MCNC: 2.1 MG/DL (ref 1.6–2.6)
MCH RBC QN AUTO: 30.1 PG (ref 26–35)
MCHC RBC AUTO-ENTMCNC: 33.4 G/DL (ref 32–34.5)
MCV RBC AUTO: 90 FL (ref 80–99.9)
MONOCYTES NFR BLD: 0.75 K/UL (ref 0.1–0.95)
MONOCYTES NFR BLD: 6 % (ref 2–12)
NEUTROPHILS NFR BLD: 73 % (ref 43–80)
NEUTS SEG NFR BLD: 9.11 K/UL (ref 1.8–7.3)
PLATELET # BLD AUTO: 348 K/UL (ref 130–450)
PMV BLD AUTO: 8.6 FL (ref 7–12)
POTASSIUM SERPL-SCNC: 4.1 MMOL/L (ref 3.5–5)
PROT SERPL-MCNC: 8.1 G/DL (ref 6.4–8.3)
RBC # BLD AUTO: 4.49 M/UL (ref 3.5–5.5)
SODIUM SERPL-SCNC: 138 MMOL/L (ref 132–146)
T4 FREE SERPL-MCNC: 1.3 NG/DL (ref 0.9–1.7)
TSH SERPL DL<=0.05 MIU/L-ACNC: 0.59 UIU/ML (ref 0.27–4.2)
WBC OTHER # BLD: 12.4 K/UL (ref 4.5–11.5)

## 2025-02-10 PROCEDURE — 80053 COMPREHEN METABOLIC PANEL: CPT

## 2025-02-10 PROCEDURE — 84443 ASSAY THYROID STIM HORMONE: CPT

## 2025-02-10 PROCEDURE — 83735 ASSAY OF MAGNESIUM: CPT

## 2025-02-10 PROCEDURE — 85025 COMPLETE CBC W/AUTO DIFF WBC: CPT

## 2025-02-10 PROCEDURE — 84439 ASSAY OF FREE THYROXINE: CPT

## 2025-02-10 PROCEDURE — 36415 COLL VENOUS BLD VENIPUNCTURE: CPT

## 2025-02-19 ENCOUNTER — HOSPITAL ENCOUNTER (OUTPATIENT)
Dept: OTHER | Age: 75
Setting detail: THERAPIES SERIES
Discharge: HOME | End: 2025-02-19
Payer: MEDICARE

## 2025-02-19 VITALS
DIASTOLIC BLOOD PRESSURE: 72 MMHG | WEIGHT: 191.6 LBS | RESPIRATION RATE: 18 BRPM | OXYGEN SATURATION: 95 % | BODY MASS INDEX: 30.01 KG/M2 | HEART RATE: 86 BPM | SYSTOLIC BLOOD PRESSURE: 166 MMHG

## 2025-02-19 LAB
ANION GAP SERPL CALCULATED.3IONS-SCNC: 15 MMOL/L (ref 7–16)
BNP SERPL-MCNC: 343 PG/ML (ref 0–450)
BUN SERPL-MCNC: 24 MG/DL (ref 6–23)
CALCIUM SERPL-MCNC: 9.9 MG/DL (ref 8.6–10.2)
CHLORIDE SERPL-SCNC: 97 MMOL/L (ref 98–107)
CO2 SERPL-SCNC: 26 MMOL/L (ref 22–29)
CREAT SERPL-MCNC: 2 MG/DL (ref 0.5–1)
GFR, ESTIMATED: 26 ML/MIN/1.73M2
GLUCOSE SERPL-MCNC: 130 MG/DL (ref 74–99)
POTASSIUM SERPL-SCNC: 4.1 MMOL/L (ref 3.5–5)
SODIUM SERPL-SCNC: 138 MMOL/L (ref 132–146)

## 2025-02-19 PROCEDURE — 99214 OFFICE O/P EST MOD 30 MIN: CPT

## 2025-02-19 PROCEDURE — 80048 BASIC METABOLIC PNL TOTAL CA: CPT

## 2025-02-19 PROCEDURE — 96374 THER/PROPH/DIAG INJ IV PUSH: CPT

## 2025-02-19 PROCEDURE — 83880 ASSAY OF NATRIURETIC PEPTIDE: CPT

## 2025-02-19 PROCEDURE — 36415 COLL VENOUS BLD VENIPUNCTURE: CPT

## 2025-02-19 RX ORDER — FUROSEMIDE 10 MG/ML
INJECTION INTRAMUSCULAR; INTRAVENOUS
Status: DISCONTINUED
Start: 2025-02-19 | End: 2025-02-20 | Stop reason: HOSPADM

## 2025-02-19 RX ORDER — FUROSEMIDE 10 MG/ML
40 INJECTION INTRAMUSCULAR; INTRAVENOUS ONCE
Status: DISCONTINUED | OUTPATIENT
Start: 2025-02-19 | End: 2025-02-20 | Stop reason: HOSPADM

## 2025-02-19 RX ORDER — SODIUM CHLORIDE 0.9 % (FLUSH) 0.9 %
10 SYRINGE (ML) INJECTION ONCE
Status: DISCONTINUED | OUTPATIENT
Start: 2025-02-19 | End: 2025-02-20 | Stop reason: HOSPADM

## 2025-02-19 NOTE — PROGRESS NOTES
Congestive Heart Failure Clinic   [facility]      Referring Provider: Dr Radha Reina  Primary Care Physician: Yuri Villalobos MD   Cardiologist: Dr Radha Reina  Nephrologist: N/A      HISTORY OF PRESENT ILLNESS:     Dilcia Mcadams is a 74 y.o. (1950) female with a history of HFpEF (EF> 50%), most recent EF:63% on 6-3-21  Lab Results   Component Value Date    LVEF 63 06/03/2021    LVEFMODE Echo 08/06/2016       Patient presents to the CHF clinic for ongoing evaluation and monitoring of heart failure.  In the CHF clinic today she denies any adverse symptoms except:  [] Dizziness or lightheadedness   [] Syncope or near syncope  [] Recent Fall  [] Shortness of breath at rest   [x] Dyspnea with exertion  [] Decline in functional capacity (unable to perform activities they had previously been able to do)  [x] Fatigue   [] Orthopnea  [] PND  [x] Nocturnal cough  [] Hemoptysis  [] Chest pain, pressure, or discomfort  [] Palpitations  [x] Abdominal distention  [x] Abdominal bloating  [] Early satiety  [] Blood in stool   [] Diarrhea  [] Constipation  [] Nausea/Vomiting  [] Decreased urinary response to oral diuretic   [] Scrotal swelling   [x] Lower extremity edema -top of feet  [] Used PRN doses of oral diuretic   [x] Weight gain    Wt Readings from Last 3 Encounters:   02/19/25 86.9 kg (191 lb 9.6 oz)   12/02/24 84.4 kg (186 lb)   11/25/24 83 kg (183 lb)     SOCIAL HISTORY:  [x] Denies tobacco, alcohol or illicit drug abuse  [] Tobacco use:  [] ETOH use:  [] Illicit drug use:        MEDICATIONS:    Allergies   Allergen Reactions    Penicillins Anaphylaxis and Shortness Of Breath    Sulfa Antibiotics Itching and Swelling     Prior to Visit Medications    Medication Sig Taking? Authorizing Provider   metoprolol succinate (TOPROL XL) 25 MG extended release tablet Take 1 tablet by mouth daily  Tiana Lauren, APRN - CNP   bisacodyl (DULCOLAX) 5 MG EC tablet Take 1

## 2025-02-25 ENCOUNTER — TELEPHONE (OUTPATIENT)
Dept: CARDIOLOGY | Age: 75
End: 2025-02-25

## 2025-02-25 NOTE — TELEPHONE ENCOUNTER
Spoke with patient and confirmed Lexiscan stress test appointment on February 27, 2025 at 0930. Instructions for test,bring inhaler and hold DM in am, and COVID-19 preprocedure information reviewed with patient, questions answered. Patient verbalized understanding.

## 2025-02-27 ENCOUNTER — HOSPITAL ENCOUNTER (OUTPATIENT)
Dept: CARDIOLOGY | Age: 75
Discharge: HOME | End: 2025-03-01
Payer: MEDICARE

## 2025-02-27 VITALS
BODY MASS INDEX: 29.19 KG/M2 | DIASTOLIC BLOOD PRESSURE: 60 MMHG | RESPIRATION RATE: 16 BRPM | WEIGHT: 186 LBS | SYSTOLIC BLOOD PRESSURE: 120 MMHG | HEART RATE: 91 BPM | HEIGHT: 67 IN

## 2025-02-27 VITALS — BODY MASS INDEX: 29.19 KG/M2 | HEIGHT: 67 IN | WEIGHT: 186 LBS

## 2025-02-27 DIAGNOSIS — R94.31 ABNORMAL ELECTROCARDIOGRAPHY: ICD-10-CM

## 2025-02-27 LAB
ECHO BSA: 2 M2
LEFT VENTRICULAR EJECTION FRACTION MODE: NORMAL
NUC STRESS EJECTION FRACTION: 68 %
STRESS BASELINE DIAS BP: 60 MMHG
STRESS BASELINE HR: 91 BPM
STRESS BASELINE SYS BP: 120 MMHG
STRESS ESTIMATED WORKLOAD: 1 METS
STRESS PEAK DIAS BP: 64 MMHG
STRESS PEAK SYS BP: 120 MMHG
STRESS PERCENT HR ACHIEVED: 63 %
STRESS POST PEAK HR: 92 BPM
STRESS RATE PRESSURE PRODUCT: NORMAL BPM*MMHG
STRESS TARGET HR: 146 BPM

## 2025-02-27 PROCEDURE — A9500 TC99M SESTAMIBI: HCPCS | Performed by: INTERNAL MEDICINE

## 2025-02-27 PROCEDURE — 93017 CV STRESS TEST TRACING ONLY: CPT

## 2025-02-27 PROCEDURE — 2500000003 HC RX 250 WO HCPCS: Performed by: INTERNAL MEDICINE

## 2025-02-27 PROCEDURE — 6360000002 HC RX W HCPCS: Performed by: STUDENT IN AN ORGANIZED HEALTH CARE EDUCATION/TRAINING PROGRAM

## 2025-02-27 PROCEDURE — 93308 TTE F-UP OR LMTD: CPT

## 2025-02-27 PROCEDURE — 3430000000 HC RX DIAGNOSTIC RADIOPHARMACEUTICAL: Performed by: INTERNAL MEDICINE

## 2025-02-27 RX ORDER — SODIUM CHLORIDE 0.9 % (FLUSH) 0.9 %
10 SYRINGE (ML) INJECTION PRN
Status: DISCONTINUED | OUTPATIENT
Start: 2025-02-27 | End: 2025-03-02 | Stop reason: HOSPADM

## 2025-02-27 RX ORDER — REGADENOSON 0.08 MG/ML
0.4 INJECTION, SOLUTION INTRAVENOUS
Status: COMPLETED | OUTPATIENT
Start: 2025-02-27 | End: 2025-02-27

## 2025-02-27 RX ORDER — TETRAKIS(2-METHOXYISOBUTYLISOCYANIDE)COPPER(I) TETRAFLUOROBORATE 1 MG/ML
10.6 INJECTION, POWDER, LYOPHILIZED, FOR SOLUTION INTRAVENOUS
Status: COMPLETED | OUTPATIENT
Start: 2025-02-27 | End: 2025-02-27

## 2025-02-27 RX ORDER — TETRAKIS(2-METHOXYISOBUTYLISOCYANIDE)COPPER(I) TETRAFLUOROBORATE 1 MG/ML
34.4 INJECTION, POWDER, LYOPHILIZED, FOR SOLUTION INTRAVENOUS
Status: COMPLETED | OUTPATIENT
Start: 2025-02-27 | End: 2025-02-27

## 2025-02-27 RX ADMIN — REGADENOSON 0.4 MG: 0.08 INJECTION, SOLUTION INTRAVENOUS at 10:45

## 2025-02-27 RX ADMIN — Medication 10.6 MILLICURIE: at 09:39

## 2025-02-27 RX ADMIN — SODIUM CHLORIDE, PRESERVATIVE FREE 10 ML: 5 INJECTION INTRAVENOUS at 09:38

## 2025-02-27 RX ADMIN — SODIUM CHLORIDE, PRESERVATIVE FREE 10 ML: 5 INJECTION INTRAVENOUS at 10:46

## 2025-02-27 RX ADMIN — SODIUM CHLORIDE, PRESERVATIVE FREE 10 ML: 5 INJECTION INTRAVENOUS at 10:45

## 2025-02-27 RX ADMIN — Medication 34.4 MILLICURIE: at 10:45

## 2025-02-28 LAB
ECHO AV MEAN GRADIENT: 6 MMHG
ECHO AV MEAN VELOCITY: 1.1 M/S
ECHO AV PEAK GRADIENT: 9 MMHG
ECHO AV PEAK VELOCITY: 1.5 M/S
ECHO AV VTI: 36.2 CM
ECHO BSA: 2 M2
ECHO LA VOL A-L A2C: 45 ML (ref 22–52)
ECHO LA VOL A-L A4C: 73 ML (ref 22–52)
ECHO LA VOL MOD A2C: 41 ML (ref 22–52)
ECHO LA VOL MOD A4C: 69 ML (ref 22–52)
ECHO LA VOLUME AREA LENGTH: 60 ML
ECHO LA VOLUME INDEX A-L A2C: 23 ML/M2 (ref 16–34)
ECHO LA VOLUME INDEX A-L A4C: 37 ML/M2 (ref 16–34)
ECHO LA VOLUME INDEX AREA LENGTH: 31 ML/M2 (ref 16–34)
ECHO LA VOLUME INDEX MOD A2C: 21 ML/M2 (ref 16–34)
ECHO LA VOLUME INDEX MOD A4C: 35 ML/M2 (ref 16–34)
ECHO LV EF PHYSICIAN: 66 %
ECHO LV FRACTIONAL SHORTENING: 34 % (ref 28–44)
ECHO LV INTERNAL DIMENSION DIASTOLE INDEX: 2.09 CM/M2
ECHO LV INTERNAL DIMENSION DIASTOLIC: 4.1 CM (ref 3.9–5.3)
ECHO LV INTERNAL DIMENSION SYSTOLIC INDEX: 1.38 CM/M2
ECHO LV INTERNAL DIMENSION SYSTOLIC: 2.7 CM
ECHO LV IVSD: 1.4 CM (ref 0.6–0.9)
ECHO LV IVSS: 1.8 CM
ECHO LV MASS 2D: 216.6 G (ref 67–162)
ECHO LV MASS INDEX 2D: 110.5 G/M2 (ref 43–95)
ECHO LV POSTERIOR WALL DIASTOLIC: 1.4 CM (ref 0.6–0.9)
ECHO LV POSTERIOR WALL SYSTOLIC: 1.4 CM
ECHO LV RELATIVE WALL THICKNESS RATIO: 0.68
ECHO MV AREA PHT: 2.6 CM2
ECHO MV MAX VELOCITY: 1.8 M/S
ECHO MV MEAN GRADIENT: 4 MMHG
ECHO MV MEAN VELOCITY: 1 M/S
ECHO MV PEAK GRADIENT: 13 MMHG
ECHO MV PRESSURE HALF TIME (PHT): 85.7 MS
ECHO MV VTI: 36.6 CM
ECHO RV INTERNAL DIMENSION: 3.3 CM
ECHO TV REGURGITANT MAX VELOCITY: 3.15 M/S
ECHO TV REGURGITANT PEAK GRADIENT: 40 MMHG

## 2025-03-06 ENCOUNTER — TELEPHONE (OUTPATIENT)
Dept: NON INVASIVE DIAGNOSTICS | Age: 75
End: 2025-03-06

## 2025-03-06 NOTE — TELEPHONE ENCOUNTER
Spoke with patient and they verbalized understanding.  Patient is scheduled for EP appt per last note and patient will contact her Cardiologist to discuss spironolactone.

## 2025-03-06 NOTE — TELEPHONE ENCOUNTER
----- Message from Dr. Servando Hernandez DO sent at 3/2/2025  8:35 PM EST -----  Regarding: TTE  TTE (2/28/25): LVEF = 66%, mild LVH, mild MR, mild TR.    Pharm Nuc Stress (2/28/25): LVEF = 68%, normal myocardial perfusion.    Labs reviewed-stable from prior but slight increase in Cr. She is on spironolactone and should discuss this with her cardiologist.    -Servando Hernandez DO

## 2025-03-10 ENCOUNTER — TELEPHONE (OUTPATIENT)
Dept: OTHER | Age: 75
End: 2025-03-10

## 2025-03-10 NOTE — TELEPHONE ENCOUNTER
11:01 AM  Patient called to cancel this weeks chf clinic visit. Unable to make an appt this week or next.     Rescheduled for:     Future Appointments   Date Time Provider Department Center   3/25/2025 12:00 PM [facility] ROOM 2 [facility] [place] HOD   3/26/2025  1:00 PM Sandhya Harmon APRN - CNP ELECTRO PHYS [facility]

## 2025-03-25 ENCOUNTER — HOSPITAL ENCOUNTER (OUTPATIENT)
Dept: OTHER | Age: 75
Setting detail: THERAPIES SERIES
Discharge: HOME | End: 2025-03-25
Payer: MEDICARE

## 2025-03-25 VITALS
HEART RATE: 70 BPM | OXYGEN SATURATION: 97 % | DIASTOLIC BLOOD PRESSURE: 79 MMHG | WEIGHT: 188 LBS | BODY MASS INDEX: 29.44 KG/M2 | RESPIRATION RATE: 16 BRPM | SYSTOLIC BLOOD PRESSURE: 128 MMHG

## 2025-03-25 LAB
ANION GAP SERPL CALCULATED.3IONS-SCNC: 13 MMOL/L (ref 7–16)
BNP SERPL-MCNC: 613 PG/ML (ref 0–450)
BUN SERPL-MCNC: 26 MG/DL (ref 6–23)
CALCIUM SERPL-MCNC: 9.9 MG/DL (ref 8.6–10.2)
CHLORIDE SERPL-SCNC: 96 MMOL/L (ref 98–107)
CO2 SERPL-SCNC: 29 MMOL/L (ref 22–29)
CREAT SERPL-MCNC: 1.7 MG/DL (ref 0.5–1)
GFR, ESTIMATED: 32 ML/MIN/1.73M2
GLUCOSE SERPL-MCNC: 139 MG/DL (ref 74–99)
LEFT VENTRICULAR EJECTION FRACTION HIGH VALUE: 66 %
LEFT VENTRICULAR EJECTION FRACTION MODE: NORMAL
LV EF: 66 %
POTASSIUM SERPL-SCNC: 4.3 MMOL/L (ref 3.5–5)
SODIUM SERPL-SCNC: 138 MMOL/L (ref 132–146)

## 2025-03-25 PROCEDURE — 36415 COLL VENOUS BLD VENIPUNCTURE: CPT

## 2025-03-25 PROCEDURE — 80048 BASIC METABOLIC PNL TOTAL CA: CPT

## 2025-03-25 PROCEDURE — 99214 OFFICE O/P EST MOD 30 MIN: CPT

## 2025-03-25 PROCEDURE — 83880 ASSAY OF NATRIURETIC PEPTIDE: CPT

## 2025-03-25 NOTE — PLAN OF CARE
Problem: Chronic Conditions and Co-morbidities  Goal: Patient's chronic conditions and co-morbidity symptoms are monitored and maintained or improved  Outcome: Progressing   Continue vplan of care

## 2025-03-27 NOTE — PROGRESS NOTES
[facility] CARDIOLOGY  CARDIAC ELECTROPHYSIOLOGY DEPARTMENT/DIVISION OF CARDIOLOGY  Outpatient Progress Report  PATIENT: Dilcia Mcadams  MEDICAL RECORD NUMBER: [id number]  DATE OF SERVICE:  4/2/2025  ATTENDING ELECTROPHYSIOLOGIST:  Servando Hernandez DO  REFERRING PHYSICIAN: No ref. provider found and Yuri Villalobos MD  CHIEF COMPLAINT: pacemaker in situ (MDT)    S: Dilcia Mcadams is a 74 y.o. 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  She is managed by Dr. Reina ([facility] cardiology) with Demadex, Aldactone, Senna, Oxygen, Melatonin, Ativan, duoneb, Norco, Glipizide, Neurontin, Metoprolol ,Trio and albuterol.  In 8/2016, she was admitted for syncope during toilet use. She had a recurrence of syncope during toilet use during admit, which was associated with 18 second episode of sinus arrest. She was evaluated by Dr Balderrama, who implanted a MDT dc PPM. In 11/2016, she was noted to have short interval count (SIC) of 600. She was evaluated multiple times over the next few years, which reported stable lead function and SIC of ~200-500. She last saw Dr Balderrama on 6/13/18 and was noted to have stable lead function with SIC of 0. She continued to be monitored remotely, but did not follow-up in-person again until 11/4/20 when she transferred EP care to Dr Walker. She was noted to have stable device function and SIC of 0. A remote interrogation on 11/2021 reported SIC of 2,694 since 2/26/21 with otherwise stable lead function. In 1/2022, she transferred EP care to Dr. Hernandez. She denied any recurrence of syncope since PPM implant.  On evaluation, she had stable device function. CXR revealed stable device appearance. Her device was reprogrammed to DDD 40 bpm and rate drop response turned on. She was noted to have elevated SIC count when seen in follow up 04/22/24 2502 with subsequent

## 2025-04-02 ENCOUNTER — OFFICE VISIT (OUTPATIENT)
Dept: NON INVASIVE DIAGNOSTICS | Age: 75
End: 2025-04-02
Payer: MEDICARE

## 2025-04-02 VITALS
WEIGHT: 189 LBS | DIASTOLIC BLOOD PRESSURE: 78 MMHG | TEMPERATURE: 96.5 F | HEART RATE: 78 BPM | SYSTOLIC BLOOD PRESSURE: 122 MMHG | HEIGHT: 65 IN | BODY MASS INDEX: 31.49 KG/M2 | OXYGEN SATURATION: 97 % | RESPIRATION RATE: 18 BRPM

## 2025-04-02 DIAGNOSIS — Z95.0 PACEMAKER: Primary | ICD-10-CM

## 2025-04-02 DIAGNOSIS — I10 HTN (HYPERTENSION), BENIGN: Chronic | ICD-10-CM

## 2025-04-02 PROCEDURE — 3017F COLORECTAL CA SCREEN DOC REV: CPT | Performed by: NURSE PRACTITIONER

## 2025-04-02 PROCEDURE — 1123F ACP DISCUSS/DSCN MKR DOCD: CPT | Performed by: NURSE PRACTITIONER

## 2025-04-02 PROCEDURE — 1090F PRES/ABSN URINE INCON ASSESS: CPT | Performed by: NURSE PRACTITIONER

## 2025-04-02 PROCEDURE — G8427 DOCREV CUR MEDS BY ELIG CLIN: HCPCS | Performed by: NURSE PRACTITIONER

## 2025-04-02 PROCEDURE — 93000 ELECTROCARDIOGRAM COMPLETE: CPT | Performed by: STUDENT IN AN ORGANIZED HEALTH CARE EDUCATION/TRAINING PROGRAM

## 2025-04-02 PROCEDURE — 99214 OFFICE O/P EST MOD 30 MIN: CPT | Performed by: NURSE PRACTITIONER

## 2025-04-02 PROCEDURE — 1036F TOBACCO NON-USER: CPT | Performed by: NURSE PRACTITIONER

## 2025-04-02 PROCEDURE — 1159F MED LIST DOCD IN RCRD: CPT | Performed by: NURSE PRACTITIONER

## 2025-04-02 PROCEDURE — G8400 PT W/DXA NO RESULTS DOC: HCPCS | Performed by: NURSE PRACTITIONER

## 2025-04-02 PROCEDURE — 3074F SYST BP LT 130 MM HG: CPT | Performed by: NURSE PRACTITIONER

## 2025-04-02 PROCEDURE — G8417 CALC BMI ABV UP PARAM F/U: HCPCS | Performed by: NURSE PRACTITIONER

## 2025-04-02 PROCEDURE — 3078F DIAST BP <80 MM HG: CPT | Performed by: NURSE PRACTITIONER

## 2025-04-02 PROCEDURE — 1160F RVW MEDS BY RX/DR IN RCRD: CPT | Performed by: NURSE PRACTITIONER

## 2025-05-23 ENCOUNTER — PROCEDURE VISIT (OUTPATIENT)
Dept: PODIATRY | Age: 75
End: 2025-05-23

## 2025-05-23 VITALS — BODY MASS INDEX: 31.49 KG/M2 | HEIGHT: 65 IN | WEIGHT: 189 LBS

## 2025-05-23 DIAGNOSIS — I73.9 PVD (PERIPHERAL VASCULAR DISEASE): ICD-10-CM

## 2025-05-23 DIAGNOSIS — E11.42 DIABETIC POLYNEUROPATHY ASSOCIATED WITH TYPE 2 DIABETES MELLITUS (HCC): ICD-10-CM

## 2025-05-23 DIAGNOSIS — I87.2 VENOUS INSUFFICIENCY (CHRONIC) (PERIPHERAL): ICD-10-CM

## 2025-05-23 DIAGNOSIS — E11.51 TYPE II DIABETES MELLITUS WITH PERIPHERAL CIRCULATORY DISORDER (HCC): ICD-10-CM

## 2025-05-23 DIAGNOSIS — R26.2 DIFFICULTY WALKING: ICD-10-CM

## 2025-05-23 DIAGNOSIS — B35.1 ONYCHOMYCOSIS: Primary | ICD-10-CM

## 2025-05-23 NOTE — PROGRESS NOTES
Patient in today for nail care. Patient does not have any complaints of pain at this time. Patient's PCP is Yuri Villalobos MD date of last ov 11/18/24          Nikki Ramírez LPN

## 2025-05-23 NOTE — PROGRESS NOTES
25     Dilcia Mcadams    : 1950  Sex: female  Age: 74 y.o.    Subjective:  The patient is seen today for evaluation regarding diabetic foot evaluation and mycotic nail care.  No other complaints noted.    Chief Complaint   Patient presents with    Nail Problem     Nail care       Current Medications:    Current Outpatient Medications:     metoprolol succinate (TOPROL XL) 25 MG extended release tablet, Take 1 tablet by mouth daily, Disp: 30 tablet, Rfl: 3    bisacodyl (DULCOLAX) 5 MG EC tablet, Take 1 tablet by mouth daily as needed for Constipation, Disp: , Rfl:     ipratropium 0.5 mg-albuterol 2.5 mg (DUONEB) 0.5-2.5 (3) MG/3ML SOLN nebulizer solution, INHALE THE CONTENTS OF 1 VIAL (3 ML) AS INSTRUCTED VIA NEBULIZER EVERY 4 HOURS AS NEEDED FOR WHEEZING / SHORTNESS OF BREATH, Disp: , Rfl:     HYDROcodone-acetaminophen (NORCO) 7.5-325 MG per tablet, Take 1 tablet by mouth every 6 hours as needed for Pain., Disp: , Rfl:     glipiZIDE (GLUCOTROL) 5 MG tablet, Take 1.5 tablets by mouth 2 times daily (before meals), Disp: , Rfl:     Budeson-Glycopyrrol-Formoterol (BREZTRI AEROSPHERE) 160-9-4.8 MCG/ACT AERO, Inhale 2 puffs into the lungs 2 times daily, Disp: , Rfl:     LORazepam (ATIVAN) 0.5 MG tablet, Take 3 tablets by mouth at bedtime., Disp: , Rfl:     spironolactone (ALDACTONE) 25 MG tablet, Take 1 tablet by mouth at bedtime, Disp: , Rfl:     albuterol sulfate HFA (PROVENTIL;VENTOLIN;PROAIR) 108 (90 Base) MCG/ACT inhaler, Inhale 2 puffs into the lungs every 6 hours as needed for Wheezing, Disp: , Rfl:     OXYGEN, Inhale 2 L into the lungs nightly as needed 4 liters nightly, Disp: , Rfl:     promethazine (PHENERGAN) 25 MG tablet, Take 1 tablet by mouth every 8 hours as needed, Disp: , Rfl:     torsemide (DEMADEX) 20 MG tablet, Take 2 tablets by mouth nightly, Disp: , Rfl:     Melatonin 10 MG TABS, Take by mouth nightly , Disp: , Rfl:     Allergies:  Allergies   Allergen Reactions    Penicillins

## 2025-06-10 RX ORDER — METOPROLOL SUCCINATE 25 MG/1
25 TABLET, EXTENDED RELEASE ORAL DAILY
Qty: 90 TABLET | Refills: 3 | Status: SHIPPED | OUTPATIENT
Start: 2025-06-10

## 2025-06-24 ENCOUNTER — HOSPITAL ENCOUNTER (OUTPATIENT)
Dept: OTHER | Age: 75
Setting detail: THERAPIES SERIES
Discharge: HOME | End: 2025-06-24
Payer: MEDICARE

## 2025-06-24 VITALS
BODY MASS INDEX: 30.95 KG/M2 | OXYGEN SATURATION: 98 % | SYSTOLIC BLOOD PRESSURE: 124 MMHG | RESPIRATION RATE: 16 BRPM | DIASTOLIC BLOOD PRESSURE: 63 MMHG | HEART RATE: 84 BPM | WEIGHT: 186 LBS

## 2025-06-24 LAB
ANION GAP SERPL CALCULATED.3IONS-SCNC: 15 MMOL/L (ref 7–16)
BNP SERPL-MCNC: 273 PG/ML (ref 0–450)
BUN SERPL-MCNC: 26 MG/DL (ref 8–23)
CALCIUM SERPL-MCNC: 9.8 MG/DL (ref 8.8–10.2)
CHLORIDE SERPL-SCNC: 96 MMOL/L (ref 98–107)
CO2 SERPL-SCNC: 25 MMOL/L (ref 22–29)
CREAT SERPL-MCNC: 1.6 MG/DL (ref 0.5–1)
GFR, ESTIMATED: 34 ML/MIN/1.73M2
GLUCOSE SERPL-MCNC: 124 MG/DL (ref 74–99)
POTASSIUM SERPL-SCNC: 3.9 MMOL/L (ref 3.5–5.1)
SODIUM SERPL-SCNC: 137 MMOL/L (ref 136–145)

## 2025-06-24 PROCEDURE — 83880 ASSAY OF NATRIURETIC PEPTIDE: CPT

## 2025-06-24 PROCEDURE — 99214 OFFICE O/P EST MOD 30 MIN: CPT

## 2025-06-24 PROCEDURE — 36415 COLL VENOUS BLD VENIPUNCTURE: CPT

## 2025-06-24 PROCEDURE — 80048 BASIC METABOLIC PNL TOTAL CA: CPT

## 2025-06-24 NOTE — PROGRESS NOTES
[facility] Failure Clinic   [facility]      Referring Provider: Dr Radha Reina  Primary Care Physician: Yuri Villalobos MD   Cardiologist: Dr Radha Reina  Nephrologist: N/A      HISTORY OF PRESENT ILLNESS:    Dilcia Mcadams is a 74 y.o. (1950) female with a history of HFpEF (EF> 50%), most recent EF:63% on 6-3-2   2-27-25 EF 66%  Lab Results   Component Value Date    LVEF 66 03/25/2025    LVEFMODE Echo 03/25/2025       Patient presents to the CHF clinic for ongoing evaluation and monitoring of heart failure.  In the CHF clinic today she denies any adverse symptoms except:  [] Dizziness or lightheadedness   [] Syncope or near syncope  [] Recent Fall  [] Shortness of breath at rest   [x] Dyspnea with exertion recovers with rest  [] Decline in functional capacity (unable to perform activities they had previously been able to do)  [] Fatigue   [] Orthopnea  [] PND  [] Nocturnal cough  [] Hemoptysis  [] Chest pain, pressure, or discomfort  [] Palpitations  [] Abdominal distention  [] Abdominal bloating  [] Early satiety  [] Blood in stool   [] Diarrhea  [] Constipation  [] Nausea/Vomiting  [] Decreased urinary response to oral diuretic   [] Scrotal swelling   [] Lower extremity edema -top of feet  [] Used PRN doses of oral diuretic   [] Weight gain    Wt Readings from Last 3 Encounters:   06/24/25 84.4 kg (186 lb)   05/23/25 85.7 kg (189 lb)   04/02/25 85.7 kg (189 lb)     SOCIAL HISTORY:  [x] Denies tobacco, alcohol or illicit drug abuse  [] Tobacco use:  [] ETOH use:  [] Illicit drug use:        MEDICATIONS:    Allergies   Allergen Reactions    Penicillins Anaphylaxis and Shortness Of Breath    Sulfa Antibiotics Itching and Swelling     Prior to Visit Medications    Medication Sig Taking? Authorizing Provider   metoprolol succinate (TOPROL XL) 25 MG extended release tablet Take 1 tablet by mouth daily Yes Eddie Yates, APRN - CNP   ipratropium 0.5

## 2025-08-17 PROCEDURE — 93294 REM INTERROG EVL PM/LDLS PM: CPT | Performed by: STUDENT IN AN ORGANIZED HEALTH CARE EDUCATION/TRAINING PROGRAM

## 2025-08-17 PROCEDURE — 93296 REM INTERROG EVL PM/IDS: CPT | Performed by: STUDENT IN AN ORGANIZED HEALTH CARE EDUCATION/TRAINING PROGRAM

## (undated) DEVICE — Z DISCONTINUED NO SUB IDED BAG SPEC RETRV M C240ML MOUTH 7.3MM L17CM SHFT 10MM NYL EZEE

## (undated) DEVICE — SKIN PREP TRAY 4 COMPARTM TRAY: Brand: MEDLINE INDUSTRIES, INC.

## (undated) DEVICE — DRAPE,CHEST,FENES,15X10,STERIL: Brand: MEDLINE

## (undated) DEVICE — DOUBLE BASIN SET: Brand: MEDLINE INDUSTRIES, INC.

## (undated) DEVICE — ACCESS PLATFORM FOR MINIMALLY INVASIVE SURGERY.: Brand: GELPORT® LAPAROSCOPIC  SYSTEM

## (undated) DEVICE — TROCAR: Brand: KII SHIELDED BLADED ACCESS SYSTEM

## (undated) DEVICE — TROCAR: Brand: KII® SLEEVE

## (undated) DEVICE — SYRINGE 20ML LL S/C 50

## (undated) DEVICE — INTENDED FOR TISSUE SEPARATION, AND OTHER PROCEDURES THAT REQUIRE A SHARP SURGICAL BLADE TO PUNCTURE OR CUT.: Brand: BARD-PARKER ® STAINLESS STEEL BLADES

## (undated) DEVICE — AGENT HEMSTAT W2XL4IN OXIDIZED REGENERATED CELOS ABSRB

## (undated) DEVICE — APPLIER CLP M/L SHFT DIA5MM 15 LIG LIGAMAX 5

## (undated) DEVICE — NEEDLE HYPO 22GA L1.5IN BLK POLYPR HUB S STL REG BVL STR

## (undated) DEVICE — RELOAD STPL L60MM H1.5-3.6MM REG TISS BLU GRIPPING SURF B

## (undated) DEVICE — INSTRUMENT POUCH: Brand: DEROYAL

## (undated) DEVICE — SPONGE LAP W18XL18IN WHT COT 4 PLY FLD STRUNG RADPQ DISP ST

## (undated) DEVICE — TOWEL,OR,DSP,ST,BLUE,STD,6/PK,12PK/CS: Brand: MEDLINE

## (undated) DEVICE — GLOVE ORANGE PI 7 1/2   MSG9075

## (undated) DEVICE — GOWN,SIRUS,FABRNF,XL,20/CS: Brand: MEDLINE

## (undated) DEVICE — LEGGINGS, PAIR, 31X48, STERILE: Brand: MEDLINE

## (undated) DEVICE — Device

## (undated) DEVICE — INSUFFLATION TUBING SET WITH FILTER, FUNNEL CONNECTOR AND LUER LOCK: Brand: JOSNOE MEDICAL INC

## (undated) DEVICE — STAPLER INT L75MM CUT LN L73MM STPL LN L77MM BLU B FRM 8

## (undated) DEVICE — PACK PROCEDURE SURG GEN CUST

## (undated) DEVICE — SEALER ENDOSCP L37CM NANO COAT BLNT TIP LAP DIV

## (undated) DEVICE — CHLORAPREP 26ML ORANGE

## (undated) DEVICE — KIT,ANTI FOG,W/SPONGE & FLUID,SOFT PACK: Brand: MEDLINE

## (undated) DEVICE — ELECTRODE PT RET AD L9FT HI MOIST COND ADH HYDRGEL CORDED

## (undated) DEVICE — STAPLER INT L34CM 60MM LNG ENDOSCP ARTC PWR + ECHELON FLX

## (undated) DEVICE — STAPLER INT DIA29MM CLS STPL H1.5-2.2MM OPN LEG L5.2MM 26

## (undated) DEVICE — GOWN,SIRUS,FABRNF,L,20/CS: Brand: MEDLINE

## (undated) DEVICE — PUMP SUC IRR TBNG L10FT W/ HNDPC ASSEMB STRYKEFLOW 2

## (undated) DEVICE — ADHESIVE SKIN CLSR 0.7ML TOP DERMBND ADV

## (undated) DEVICE — SYRINGE IRRIG 60ML SFT PLIABLE BLB EZ TO GRP 1 HND USE W/

## (undated) DEVICE — INSUFFLATION NEEDLE TO ESTABLISH PNEUMOPERITONEUM.: Brand: INSUFFLATION NEEDLE

## (undated) DEVICE — 4-PORT MANIFOLD: Brand: NEPTUNE 2

## (undated) DEVICE — YANKAUER,POOLE TIP,STERILE,50/CS: Brand: MEDLINE

## (undated) DEVICE — LAPAROSCOPIC SCISSORS: Brand: EPIX LAPAROSCOPIC SCISSORS

## (undated) DEVICE — SOLUTION IRRIG 3000ML 0.9% SOD CHL USP UROMATIC PLAS CONT

## (undated) DEVICE — TOTAL TRAY, 16FR 10ML SIL FOLEY, URN: Brand: MEDLINE